# Patient Record
Sex: MALE | Race: WHITE | NOT HISPANIC OR LATINO | Employment: OTHER | URBAN - METROPOLITAN AREA
[De-identification: names, ages, dates, MRNs, and addresses within clinical notes are randomized per-mention and may not be internally consistent; named-entity substitution may affect disease eponyms.]

---

## 2023-08-21 ENCOUNTER — LAB REQUISITION (OUTPATIENT)
Dept: LAB | Facility: HOSPITAL | Age: 68
End: 2023-08-21
Payer: MEDICARE

## 2023-08-21 DIAGNOSIS — Z00.00 ENCOUNTER FOR GENERAL ADULT MEDICAL EXAMINATION WITHOUT ABNORMAL FINDINGS: ICD-10-CM

## 2023-08-21 LAB
25(OH)D3 SERPL-MCNC: 58.5 NG/ML (ref 30–100)
ALBUMIN SERPL BCP-MCNC: 4 G/DL (ref 3.5–5)
ALP SERPL-CCNC: 72 U/L (ref 46–116)
ALT SERPL W P-5'-P-CCNC: 17 U/L (ref 12–78)
ANION GAP SERPL CALCULATED.3IONS-SCNC: 3 MMOL/L
AST SERPL W P-5'-P-CCNC: 12 U/L (ref 5–45)
BASOPHILS # BLD AUTO: 0.03 THOUSANDS/ÂΜL (ref 0–0.1)
BASOPHILS NFR BLD AUTO: 1 % (ref 0–1)
BILIRUB SERPL-MCNC: 0.54 MG/DL (ref 0.2–1)
BNP SERPL-MCNC: 33 PG/ML (ref 0–100)
BUN SERPL-MCNC: 17 MG/DL (ref 5–25)
CALCIUM SERPL-MCNC: 9.2 MG/DL (ref 8.3–10.1)
CHLORIDE SERPL-SCNC: 111 MMOL/L (ref 96–108)
CHOLEST SERPL-MCNC: 94 MG/DL
CO2 SERPL-SCNC: 27 MMOL/L (ref 21–32)
CREAT SERPL-MCNC: 1.47 MG/DL (ref 0.6–1.3)
EOSINOPHIL # BLD AUTO: 0.02 THOUSAND/ÂΜL (ref 0–0.61)
EOSINOPHIL NFR BLD AUTO: 1 % (ref 0–6)
ERYTHROCYTE [DISTWIDTH] IN BLOOD BY AUTOMATED COUNT: 14.4 % (ref 11.6–15.1)
GFR SERPL CREATININE-BSD FRML MDRD: 48 ML/MIN/1.73SQ M
GLUCOSE SERPL-MCNC: 92 MG/DL (ref 65–140)
HCT VFR BLD AUTO: 42 % (ref 36.5–49.3)
HDLC SERPL-MCNC: 52 MG/DL
HGB BLD-MCNC: 13.8 G/DL (ref 12–17)
IMM GRANULOCYTES # BLD AUTO: 0.01 THOUSAND/UL (ref 0–0.2)
IMM GRANULOCYTES NFR BLD AUTO: 0 % (ref 0–2)
LDLC SERPL CALC-MCNC: 26 MG/DL (ref 0–100)
LYMPHOCYTES # BLD AUTO: 1.17 THOUSANDS/ÂΜL (ref 0.6–4.47)
LYMPHOCYTES NFR BLD AUTO: 29 % (ref 14–44)
MCH RBC QN AUTO: 28.4 PG (ref 26.8–34.3)
MCHC RBC AUTO-ENTMCNC: 32.9 G/DL (ref 31.4–37.4)
MCV RBC AUTO: 86 FL (ref 82–98)
MONOCYTES # BLD AUTO: 0.32 THOUSAND/ÂΜL (ref 0.17–1.22)
MONOCYTES NFR BLD AUTO: 8 % (ref 4–12)
NEUTROPHILS # BLD AUTO: 2.56 THOUSANDS/ÂΜL (ref 1.85–7.62)
NEUTS SEG NFR BLD AUTO: 61 % (ref 43–75)
NONHDLC SERPL-MCNC: 42 MG/DL
NRBC BLD AUTO-RTO: 0 /100 WBCS
PLATELET # BLD AUTO: 84 THOUSANDS/UL (ref 149–390)
PMV BLD AUTO: 10.8 FL (ref 8.9–12.7)
POTASSIUM SERPL-SCNC: 4.6 MMOL/L (ref 3.5–5.3)
PROT SERPL-MCNC: 7.5 G/DL (ref 6.4–8.4)
PSA SERPL-MCNC: 23.87 NG/ML (ref 0–4)
RBC # BLD AUTO: 4.86 MILLION/UL (ref 3.88–5.62)
SODIUM SERPL-SCNC: 141 MMOL/L (ref 135–147)
T4 FREE SERPL-MCNC: 0.78 NG/DL (ref 0.61–1.12)
TRIGL SERPL-MCNC: 82 MG/DL
TSH SERPL DL<=0.05 MIU/L-ACNC: 0.84 UIU/ML (ref 0.45–4.5)
WBC # BLD AUTO: 4.11 THOUSAND/UL (ref 4.31–10.16)

## 2023-08-21 PROCEDURE — 83880 ASSAY OF NATRIURETIC PEPTIDE: CPT | Performed by: FAMILY MEDICINE

## 2023-08-21 PROCEDURE — G0103 PSA SCREENING: HCPCS | Performed by: FAMILY MEDICINE

## 2023-08-21 PROCEDURE — 84439 ASSAY OF FREE THYROXINE: CPT | Performed by: FAMILY MEDICINE

## 2023-08-21 PROCEDURE — 83036 HEMOGLOBIN GLYCOSYLATED A1C: CPT | Performed by: FAMILY MEDICINE

## 2023-08-21 PROCEDURE — 80053 COMPREHEN METABOLIC PANEL: CPT | Performed by: FAMILY MEDICINE

## 2023-08-21 PROCEDURE — 84443 ASSAY THYROID STIM HORMONE: CPT | Performed by: FAMILY MEDICINE

## 2023-08-21 PROCEDURE — 85025 COMPLETE CBC W/AUTO DIFF WBC: CPT | Performed by: FAMILY MEDICINE

## 2023-08-21 PROCEDURE — 80061 LIPID PANEL: CPT | Performed by: FAMILY MEDICINE

## 2023-08-21 PROCEDURE — 82306 VITAMIN D 25 HYDROXY: CPT | Performed by: FAMILY MEDICINE

## 2023-08-22 LAB
EST. AVERAGE GLUCOSE BLD GHB EST-MCNC: 94 MG/DL
HBA1C MFR BLD: 4.9 %

## 2023-08-25 ENCOUNTER — NURSE TRIAGE (OUTPATIENT)
Age: 68
End: 2023-08-25

## 2023-08-25 NOTE — TELEPHONE ENCOUNTER
Michele Bulls from [facility] [employer] called the call to the nurse.      Please review     [employer] can be reached at [phone]

## 2023-08-25 NOTE — TELEPHONE ENCOUNTER
Called to schedule an appointment for elevated PSA. Please get insurance and emergency contact information when patient calls back before we schedule. General message left due to no communication sheet on file.

## 2023-08-25 NOTE — TELEPHONE ENCOUNTER
Scheduled for Dr. Tom Agee in October per [facility] staff. Verify insurance when patient arrives [employer] but Medicare is primary. They did not have Medicare number. No noted urological issues. Left a voicemail at [employer] to call back and schedule patient. Regarding: NP, Elevated PSA 23.87  ----- Message from [facility] sent at 8/25/2023  9:28 AM EDT -----  [employer] calling to schedule NP appt for patient for elevated PSA.  Patient's psa is 23.87     [employer] can be reached at [phone] Ext [phone]

## 2023-10-12 ENCOUNTER — CONSULT (OUTPATIENT)
Dept: CARDIOLOGY CLINIC | Facility: CLINIC | Age: 68
End: 2023-10-12
Payer: MEDICARE

## 2023-10-12 VITALS
DIASTOLIC BLOOD PRESSURE: 82 MMHG | BODY MASS INDEX: 26.93 KG/M2 | HEART RATE: 100 BPM | HEIGHT: 63 IN | SYSTOLIC BLOOD PRESSURE: 124 MMHG | WEIGHT: 152 LBS

## 2023-10-12 DIAGNOSIS — Z86.010 HISTORY OF COLON POLYPS: ICD-10-CM

## 2023-10-12 DIAGNOSIS — N18.31 STAGE 3A CHRONIC KIDNEY DISEASE (HCC): ICD-10-CM

## 2023-10-12 DIAGNOSIS — I10 HTN (HYPERTENSION), BENIGN: ICD-10-CM

## 2023-10-12 DIAGNOSIS — Z79.899 IMMUNOSUPPRESSION DUE TO DRUG THERAPY: ICD-10-CM

## 2023-10-12 DIAGNOSIS — I50.22 CHRONIC SYSTOLIC HEART FAILURE (HCC): ICD-10-CM

## 2023-10-12 DIAGNOSIS — Z94.1 S/P ORTHOTOPIC HEART TRANSPLANT (HCC): Primary | ICD-10-CM

## 2023-10-12 DIAGNOSIS — Z86.79 HISTORY OF CARDIOMYOPATHY: ICD-10-CM

## 2023-10-12 DIAGNOSIS — D84.821 IMMUNOSUPPRESSION DUE TO DRUG THERAPY: ICD-10-CM

## 2023-10-12 PROCEDURE — 99205 OFFICE O/P NEW HI 60 MIN: CPT | Performed by: INTERNAL MEDICINE

## 2023-10-12 RX ORDER — LORATADINE 10 MG/1
10 TABLET ORAL DAILY
COMMUNITY

## 2023-10-12 RX ORDER — MIRTAZAPINE 15 MG/1
15 TABLET, FILM COATED ORAL
COMMUNITY

## 2023-10-12 RX ORDER — MYCOPHENOLATE MOFETIL 250 MG/1
CAPSULE ORAL
COMMUNITY
Start: 2023-06-12

## 2023-10-12 RX ORDER — FAMOTIDINE 20 MG/1
20 TABLET, FILM COATED ORAL 2 TIMES DAILY
COMMUNITY

## 2023-10-12 RX ORDER — ASPIRIN 81 MG/1
81 TABLET ORAL DAILY
COMMUNITY

## 2023-10-12 RX ORDER — TAMSULOSIN HYDROCHLORIDE 0.4 MG/1
0.4 CAPSULE ORAL
COMMUNITY

## 2023-10-12 RX ORDER — TACROLIMUS 1 MG/1
2 CAPSULE ORAL 2 TIMES DAILY
COMMUNITY
Start: 2023-06-12

## 2023-10-12 RX ORDER — PRAVASTATIN SODIUM 40 MG
TABLET ORAL
COMMUNITY
Start: 2023-06-12

## 2023-10-12 RX ORDER — AMLODIPINE BESYLATE 10 MG/1
10 TABLET ORAL DAILY
Qty: 90 TABLET | Refills: 3 | Status: SHIPPED | OUTPATIENT
Start: 2023-10-12

## 2023-10-12 RX ORDER — AMLODIPINE BESYLATE 10 MG/1
10 TABLET ORAL DAILY
COMMUNITY
End: 2023-10-12 | Stop reason: SDUPTHER

## 2023-10-12 RX ORDER — LOSARTAN POTASSIUM 100 MG/1
100 TABLET ORAL DAILY
COMMUNITY
Start: 2023-06-12

## 2023-10-12 RX ORDER — TACROLIMUS 0.5 MG/1
0.5 CAPSULE ORAL DAILY
COMMUNITY
Start: 2023-06-12

## 2023-10-16 ENCOUNTER — OFFICE VISIT (OUTPATIENT)
Dept: UROLOGY | Facility: MEDICAL CENTER | Age: 68
End: 2023-10-16
Payer: MEDICARE

## 2023-10-16 VITALS
HEART RATE: 106 BPM | HEIGHT: 66 IN | OXYGEN SATURATION: 97 % | WEIGHT: 147 LBS | SYSTOLIC BLOOD PRESSURE: 124 MMHG | DIASTOLIC BLOOD PRESSURE: 82 MMHG | BODY MASS INDEX: 23.63 KG/M2

## 2023-10-16 DIAGNOSIS — K40.90 LEFT INGUINAL HERNIA: ICD-10-CM

## 2023-10-16 DIAGNOSIS — N40.1 BENIGN PROSTATIC HYPERPLASIA WITH URINARY OBSTRUCTION: ICD-10-CM

## 2023-10-16 DIAGNOSIS — R97.20 ELEVATED PSA: Primary | ICD-10-CM

## 2023-10-16 DIAGNOSIS — N13.8 BENIGN PROSTATIC HYPERPLASIA WITH URINARY OBSTRUCTION: ICD-10-CM

## 2023-10-16 DIAGNOSIS — Z94.1 HEART TRANSPLANT, HETEROTOPIC, STATUS (HCC): ICD-10-CM

## 2023-10-16 PROBLEM — E78.5 HYPERLIPIDEMIA LDL GOAL <100: Status: ACTIVE | Noted: 2021-02-11

## 2023-10-16 PROBLEM — D69.6 THROMBOCYTOPENIA (HCC): Status: ACTIVE | Noted: 2020-07-08

## 2023-10-16 LAB
SL AMB  POCT GLUCOSE, UA: ABNORMAL
SL AMB LEUKOCYTE ESTERASE,UA: ABNORMAL
SL AMB POCT BILIRUBIN,UA: ABNORMAL
SL AMB POCT BLOOD,UA: ABNORMAL
SL AMB POCT CLARITY,UA: CLEAR
SL AMB POCT COLOR,UA: YELLOW
SL AMB POCT KETONES,UA: ABNORMAL
SL AMB POCT NITRITE,UA: ABNORMAL
SL AMB POCT PH,UA: 5.5
SL AMB POCT SPECIFIC GRAVITY,UA: 1.02
SL AMB POCT URINE PROTEIN: ABNORMAL
SL AMB POCT UROBILINOGEN: 0.2

## 2023-10-16 PROCEDURE — 99204 OFFICE O/P NEW MOD 45 MIN: CPT | Performed by: UROLOGY

## 2023-10-16 PROCEDURE — 81003 URINALYSIS AUTO W/O SCOPE: CPT | Performed by: UROLOGY

## 2023-10-16 NOTE — ASSESSMENT & PLAN NOTE
PSA was 23.87 in August 2023. PSA was 26.9 in August 2021. Digital rectal examination reveals a very large prostate that is palpably benign. Causes of PSA elevation were discussed. Options were discussed and I did recommend we proceed with a multiparametric MRI of the prostate for further evaluation.

## 2023-10-16 NOTE — PROGRESS NOTES
Assessment/Plan:    Elevated PSA  PSA was 23.87 in August 2023. PSA was 26.9 in August 2021. Digital rectal examination reveals a very large prostate that is palpably benign. Causes of PSA elevation were discussed. Options were discussed and I did recommend we proceed with a multiparametric MRI of the prostate for further evaluation. Benign prostatic hyperplasia with urinary obstruction  AUA symptom score is 9 on tamsulosin. He is satisfied with his voiding pattern at this time. We will continue to follow on present regimen. Left inguinal hernia  Patient is considering repair. Diagnoses and all orders for this visit:    Elevated PSA  -     POCT urine dip auto non-scope  -     MRI prostate multiparametric wo w contrast; Future  -     Basic metabolic panel; Future    Benign prostatic hyperplasia with urinary obstruction    Left inguinal hernia    Heart transplant, heterotopic, status ([facility])          Subjective:      Patient ID: Amadeo Lopez is a 70 y.o. male. Elevated PSA    50-year-old male with a long history of PSA elevation. He is seen today in consultation. He reports a long history of PSA elevation. He has not had a prior biopsy. PSA in August 2021 was 26.9. Benign Prostatic Hypertrophy  This is a chronic problem. The current episode started more than 1 year ago. The problem is unchanged. Irritative symptoms include urgency. Irritative symptoms do not include frequency or nocturia. Obstructive symptoms include a slower stream. Obstructive symptoms do not include dribbling, incomplete emptying, an intermittent stream, straining or a weak stream. Pertinent negatives include no chills, dysuria, hematuria or hesitancy. AUA score is 8-19. He is sexually active. The symptoms are aggravated by alcohol. Past treatments include tamsulosin. He has been using treatment for 1 to 4 weeks. He had been on cardura 8mg previously but this caused dizziness.     The following portions of the patient's history were reviewed and updated as appropriate: allergies, current medications, past family history, past medical history, past social history, past surgical history, and problem list.    Review of Systems   Constitutional:  Negative for chills, diaphoresis, fatigue and fever. HENT: Negative. Eyes: Negative. Respiratory: Negative. Cardiovascular: Negative. Endocrine: Negative. Genitourinary:  Positive for urgency. Negative for dysuria, frequency, hematuria, hesitancy, incomplete emptying and nocturia. See HPI   Musculoskeletal: Negative. Skin: Negative. Allergic/Immunologic: Negative. Neurological: Negative. Hematological: Negative. Psychiatric/Behavioral: Negative. AUA SYMPTOM SCORE      Flowsheet Row Most Recent Value   AUA SYMPTOM SCORE    How often have you had a sensation of not emptying your bladder completely after you finished urinating? 1 (P)     How often have you had to urinate again less than two hours after you finished urinating? 1 (P)     How often have you found you stopped and started again several times when you urinate? 2 (P)     How often have you found it difficult to postpone urination? 1 (P)     How often have you had a weak urinary stream? 3 (P)     How often have you had to push or strain to begin urination? 0 (P)     How many times did you most typically get up to urinate from the time you went to bed at night until the time you got up in the morning? 1 (P)     Quality of Life: If you were to spend the rest of your life with your urinary condition just the way it is now, how would you feel about that? 2 (P)     AUA SYMPTOM SCORE 9 (P)            Objective:      /82 (BP Location: Left arm, Patient Position: Sitting, Cuff Size: Standard)   Pulse (!) 106   Ht 5' 5.5" (1.664 m)   Wt 66.7 kg (147 lb)   SpO2 97%   BMI 24.09 kg/m²          Physical Exam  Vitals reviewed. Constitutional:       General: He is not in acute distress. Appearance: Normal appearance. He is well-developed and normal weight. He is not ill-appearing, toxic-appearing or diaphoretic. HENT:      Head: Normocephalic and atraumatic. Eyes:      General: No scleral icterus. Conjunctiva/sclera: Conjunctivae normal.   Cardiovascular:      Rate and Rhythm: Normal rate. Pulmonary:      Effort: Pulmonary effort is normal.   Abdominal:      General: Bowel sounds are normal. There is no distension. Palpations: Abdomen is soft. There is no mass. Tenderness: There is no abdominal tenderness. There is no right CVA tenderness, left CVA tenderness, guarding or rebound. Hernia: A hernia is present. Comments: Large reducible left inguinal hernia   Genitourinary:     Penis: Normal. No phimosis or hypospadias. Testes: Normal.         Right: Mass not present. Left: Mass not present. Rectum: Normal.      Comments: Prostate 4 times enlarged. The prostate is firm, smooth without nodularity. Palpably benign. Musculoskeletal:         General: Normal range of motion. Cervical back: Normal range of motion and neck supple. Skin:     General: Skin is warm and dry. Neurological:      General: No focal deficit present. Mental Status: He is alert and oriented to person, place, and time. Psychiatric:         Mood and Affect: Mood normal.         Behavior: Behavior normal.         Thought Content:  Thought content normal.         Judgment: Judgment normal.

## 2023-10-16 NOTE — ASSESSMENT & PLAN NOTE
AUA symptom score is 9 on tamsulosin. He is satisfied with his voiding pattern at this time. We will continue to follow on present regimen.

## 2023-10-23 ENCOUNTER — APPOINTMENT (OUTPATIENT)
Dept: LAB | Facility: CLINIC | Age: 68
End: 2023-10-23
Payer: MEDICARE

## 2023-10-23 DIAGNOSIS — R97.20 ELEVATED PSA: ICD-10-CM

## 2023-10-23 LAB
ANION GAP SERPL CALCULATED.3IONS-SCNC: 6 MMOL/L
BUN SERPL-MCNC: 23 MG/DL (ref 5–25)
CALCIUM SERPL-MCNC: 8.8 MG/DL (ref 8.4–10.2)
CHLORIDE SERPL-SCNC: 107 MMOL/L (ref 96–108)
CO2 SERPL-SCNC: 26 MMOL/L (ref 21–32)
CREAT SERPL-MCNC: 1.28 MG/DL (ref 0.6–1.3)
GFR SERPL CREATININE-BSD FRML MDRD: 56 ML/MIN/1.73SQ M
GLUCOSE SERPL-MCNC: 89 MG/DL (ref 65–140)
POTASSIUM SERPL-SCNC: 4.6 MMOL/L (ref 3.5–5.3)
SODIUM SERPL-SCNC: 139 MMOL/L (ref 135–147)

## 2023-10-23 PROCEDURE — 36415 COLL VENOUS BLD VENIPUNCTURE: CPT

## 2023-10-23 PROCEDURE — 80048 BASIC METABOLIC PNL TOTAL CA: CPT

## 2023-11-02 ENCOUNTER — HOSPITAL ENCOUNTER (OUTPATIENT)
Facility: MEDICAL CENTER | Age: 68
Discharge: HOME | End: 2023-11-02
Attending: UROLOGY
Payer: MEDICARE

## 2023-11-02 DIAGNOSIS — R97.20 ELEVATED PSA: ICD-10-CM

## 2023-11-02 PROCEDURE — 72197 MRI PELVIS W/O & W/DYE: CPT

## 2023-11-02 PROCEDURE — A9585 GADOBUTROL INJECTION: HCPCS | Performed by: UROLOGY

## 2023-11-02 PROCEDURE — G1004 CDSM NDSC: HCPCS

## 2023-11-02 PROCEDURE — 76377 3D RENDER W/INTRP POSTPROCES: CPT

## 2023-11-02 RX ORDER — GADOBUTROL 604.72 MG/ML
7.5 INJECTION INTRAVENOUS
Status: COMPLETED | OUTPATIENT
Start: 2023-11-02 | End: 2023-11-02

## 2023-11-02 RX ADMIN — GADOBUTROL 7.5 ML: 604.72 INJECTION INTRAVENOUS at 16:48

## 2023-11-03 ENCOUNTER — CONSULT (OUTPATIENT)
Dept: DERMATOLOGY | Facility: CLINIC | Age: 68
End: 2023-11-03

## 2023-11-03 VITALS — BODY MASS INDEX: 24.11 KG/M2 | HEIGHT: 66 IN | WEIGHT: 150 LBS | TEMPERATURE: 98 F

## 2023-11-03 DIAGNOSIS — D22.70 MULTIPLE BENIGN NEVI OF UPPER EXTREMITY, LOWER EXTREMITY, AND TRUNK: ICD-10-CM

## 2023-11-03 DIAGNOSIS — D18.01 CHERRY ANGIOMA: ICD-10-CM

## 2023-11-03 DIAGNOSIS — Z94.1 S/P ORTHOTOPIC HEART TRANSPLANT (HCC): ICD-10-CM

## 2023-11-03 DIAGNOSIS — L82.1 SEBORRHEIC KERATOSIS: ICD-10-CM

## 2023-11-03 DIAGNOSIS — L20.9 ATOPIC DERMATITIS, UNSPECIFIED TYPE: Primary | ICD-10-CM

## 2023-11-03 DIAGNOSIS — D48.5 NEOPLASM OF UNCERTAIN BEHAVIOR OF SKIN: ICD-10-CM

## 2023-11-03 DIAGNOSIS — L57.8 OTHER SKIN CHANGES DUE TO CHRONIC EXPOSURE TO NONIONIZING RADIATION: ICD-10-CM

## 2023-11-03 DIAGNOSIS — D22.5 MULTIPLE BENIGN NEVI OF UPPER EXTREMITY, LOWER EXTREMITY, AND TRUNK: ICD-10-CM

## 2023-11-03 DIAGNOSIS — D22.60 MULTIPLE BENIGN NEVI OF UPPER EXTREMITY, LOWER EXTREMITY, AND TRUNK: ICD-10-CM

## 2023-11-03 DIAGNOSIS — L81.4 SOLAR LENTIGO: ICD-10-CM

## 2023-11-03 PROCEDURE — 88341 IMHCHEM/IMCYTCHM EA ADD ANTB: CPT | Performed by: STUDENT IN AN ORGANIZED HEALTH CARE EDUCATION/TRAINING PROGRAM

## 2023-11-03 PROCEDURE — 88305 TISSUE EXAM BY PATHOLOGIST: CPT | Performed by: STUDENT IN AN ORGANIZED HEALTH CARE EDUCATION/TRAINING PROGRAM

## 2023-11-03 PROCEDURE — 88342 IMHCHEM/IMCYTCHM 1ST ANTB: CPT | Performed by: STUDENT IN AN ORGANIZED HEALTH CARE EDUCATION/TRAINING PROGRAM

## 2023-11-03 NOTE — PROGRESS NOTES
[facility] Note     Patient Name: Sonido Hebert  Encounter Date: 11/3/2023     Have you been cared for by a [facility]ologist in the last 3 years and, if so, which description applies to you? NO. I am considered a "new" patient and must complete all patient intake questions. I am MALE/not capable of bearing children. REVIEW OF SYSTEMS:  Have you recently had or currently have any of the following? Recent fever or chills? No  Any non-healing wound? No   PAST MEDICAL HISTORY:  Have you personally ever had or currently have any of the following? If "YES," then please provide more detail. Skin cancer (such as Melanoma, Basal Cell Carcinoma, Squamous Cell Carcinoma? No  Tuberculosis, HIV/AIDS, Hepatitis B or C: No  Radiation Treatment No   HISTORY OF IMMUNOSUPPRESSION:   Do you have a history of any of the following:  Systemic Immunosuppression such as Diabetes, Biologic or Immunotherapy, Chemotherapy, Organ Transplantation, Bone Marrow Transplantation? YES, Heart transplant 7/7/2017       FAMILY HISTORY:  Any "first degree relatives" (parent, brother, sister, or child) with the following? Skin Cancer, Pancreatic or Other Cancer? No   PATIENT EXPERIENCE:    Do you want the Dermatologist to perform a COMPLETE skin exam today including a clinical examination under the "bra and underwear" areas? Yes  If necessary, do we have your permission to call and leave a detailed message on your Preferred Phone number that includes your specific medical information?   Yes      Allergies   Allergen Reactions    Oxycodone-Acetaminophen Hives     Other reaction(s): rash    Codeine Hives     URTICARIA   Other reaction(s): rash and hives      Current Outpatient Medications:     amLODIPine (NORVASC) 10 mg tablet, Take 1 tablet (10 mg total) by mouth daily, Disp: 90 tablet, Rfl: 3    aspirin (ECOTRIN LOW STRENGTH) 81 mg EC tablet, Take 81 mg by mouth daily, Disp: , Rfl:     famotidine (PEPCID) 20 mg tablet, Take 20 mg by mouth 2 (two) times a day, Disp: , Rfl:     loratadine (CLARITIN) 10 mg tablet, Take 10 mg by mouth daily, Disp: , Rfl:     losartan (COZAAR) 100 MG tablet, Take 100 mg by mouth daily, Disp: , Rfl:     mirtazapine (REMERON) 15 mg tablet, Take 15 mg by mouth daily at bedtime, Disp: , Rfl:     mycophenolate (CELLCEPT) 250 mg capsule, TAKE 2 CAPSULES (500 MG) BY MOUTH TWO (2) TIMES A  DAY, Disp: , Rfl:     pravastatin (PRAVACHOL) 40 mg tablet, TAKE 1 TABLET (40 MG) BY MOUTH DAILY, Disp: , Rfl:     tacrolimus (PROGRAF) 0.5 mg capsule, Take 0.5 mg by mouth daily, Disp: , Rfl:     tacrolimus (PROGRAF) 1 mg capsule, Take 2 mg by mouth 2 (two) times a day, Disp: , Rfl:     tamsulosin (FLOMAX) 0.4 mg, Take 0.4 mg by mouth daily with dinner, Disp: , Rfl:   No current facility-administered medications for this visit. Whom besides the patient is providing clinical information about today's encounter? NO ADDITIONAL HISTORIAN (patient alone provided history)    Physical Exam and Assessment/Plan by Diagnosis:    IMMUNOSUPPRESSION    Physical Exam:  Anatomic Location Affected:  S/P Heart transplant      Additional History of Present Condition:  Heart transplant 7/7/2017. Pateint is on cellcept and tacrolimus    Assessment and Plan:  Based on a thorough discussion of this condition and the management approach to it (including a comprehensive discussion of the known risks, side effects and potential benefits of treatment), the patient (family) agrees to implement the following specific plan:  Continue routine skin exams    ROGEL ANGIOMAS     Physical Exam:  Anatomic Location Affected:  Trunk and extremites  Morphological Description:  Scattered cherry red papules  Denies pain, itch, bleeding. No treatments tried. Present for years. Present constantly; no modifying factors which make it worse or better.      Assessment and Plan:  Based on a thorough discussion of this condition and the management approach to it (including a comprehensive discussion of the known risks, side effects and potential benefits of treatment), the patient (family) agrees to implement the following specific plan:  Reassure benign        SEBORRHEIC KERATOSIS; NON-INFLAMED     Physical Exam:  Anatomic Location Affected:  Trunk and extremities  Morphological Description:  Waxy, smooth to warty textured, yellow to brownish-grey to dark brown to blackish, discrete, "stuck-on" appearing papules. Present for years. Denies pain, itch, bleeding. Additional History of Present Condition:  Present constantly; no modifying factors which make it worse or better. No prior treatment. Assessment and Plan:  Based on a thorough discussion of this condition and the management approach to it (including a comprehensive discussion of the known risks, side effects and potential benefits of treatment), the patient (family) agrees to implement the following specific plan:  Reassure benign  Use sun protection. Apply SPF 30 or higher at least three times a day. Wear sun protecting clothing and hats. SOLAR LENTIGINES   OTHER SKIN CHANGES DUE TO CHRONIC EXPOSURE TO NONIONIZING RADIATION     Physical Exam:  Anatomic Location Affected:  Sun exposed areas of back, chest, arms, legs  Morphological Description:  Multiple scattered brown to tan evenly pigmented macules   Denies pain, itch, bleeding. No treatments tried. Present for months - years. Reports getting newer lesions with sun exposure. Assessment and Plan:  Based on a thorough discussion of this condition and the management approach to it (including a comprehensive discussion of the known risks, side effects and potential benefits of treatment), the patient (family) agrees to implement the following specific plan:  Reassure benign  Use sun protection. Apply SPF 30 or higher at least three times a day. Wear sun protecting clothing and hats.          MULTIPLE MELANOCYTIC NEVI ("Moles")     Physical Exam:  Anatomic Location Affected: Trunk and extremities  Morphological Description:  Scattered, round to ovoid, symmetrical-appearing, even bordered, skin colored to dark brown macules/papules  Denies pain, itch, bleeding. No treatments tried. Present for years. Present constantly; no modifying factors which make it worse or better. Denies actively changing or growing moles. Assessment and Plan:  Based on a thorough discussion of this condition and the management approach to it (including a comprehensive discussion of the known risks, side effects and potential benefits of treatment), the patient (family) agrees to implement the following specific plan:  Reassure benign  Monitor for changes  Use sun protection. Apply SPF 30 or higher at least three times a day. Wear sun protecting clothing and hats. Worrisome signs of skin malignancy discussed, questions answered. Regular self-skin check discussed. Advised to call or return to office if patient notices any spots of concern, rapidly growing/changing lesions, bleeding lesions, non-healing lesions. Advised regular SPF use. ATOPIC DERMATITIS (ECZEMA)     Physical exam:  notable for xerotic, erythematous scaly plaques located on b/l popliteal fossae  BSA: 5%  No treatments tried    Assessment/Plan:   History and physical consistent with atopic dermatitis  Educated patient on atopic dermatitis, including natural progression of disease with expected periods of quiescence and flaring.    Discussed treatment options including general skin care recommendations, topical anti-inflammatories (steroids, calcineurin inhibitors, opzelura), Dupixent, Adbry   Based on a thorough discussion of this condition and the management approach to it (including a comprehensive discussion of the known risks, side effects and potential benefits of treatment), the patient (family) agrees to implement the following specific plan:        ALWAYS APPLY ANY PRESCRIBED MEDICINE TO THE SKIN FIRST. THEN APPLY A MOISTURIZER    FLARING -Follow these instructions when the skin is pink or red and itchy. For active areas on the BODY: apply fluocinonide  0.05% cream  2 times per day for up to 2 weeks, once after bath and one other time during the day. DO NOT apply to [address], or “normal” skin (skin that is not red/itchy)    Then apply moisturizer on top of medicine       TIP For the itching/general skin care:  -- Keep moisturizer in fridge. Cool is soothing!  -- Shower with lukewarm water less than 10 minutes   -- Use Dove unscented soap to groin and armpits and neck  -- Pat dry after shower. Do not harshly rub.   -- Immediately moisturize with heavy emollient   BEST - OINTMENTS, such as Vaseline, Aquaphor, Cerave healing ointment   BETTER - CREAMS, such as Cerave, Cetaphil, VaniCREAM, Aveeno, Eucerin  AVOID LOTIONS, too thin, most things in pump  -- Moisturize twice a day. -- Apply your prescription medicine twice a day for up to 2 weeks. You can use longer than 2 weeks if red irritated rash persists. Then after that, use as needed for itch. This medicine can cause skin thinning if no rash present. -- When rash clears, KEEP MOISTURIZING DAILY, so rash does not return    Note: Discussed side effects of topical steroid overuse, including, but not limited to, skin atrophy, telangiectasias, striae, hyperpigmentation and hypopigmentation. IF YOU HAVE RUN OUT OF YOUR PRESCRIPTION, PLEASE CALL THE PHARMACY TO CHECK IF THERE IS A REFILL. WE OFTEN SEND MULTIPLE REFILLS. NEOPLASM OF UNCERTAIN BEHAVIOR OF SKIN    Physical Exam:  (Anatomic Location); (Size and Morphological Description); (Differential Diagnosis):  Specimen A: skin; shave biopsy; right flank with 0.7 cm brown thin papule with focal dark brown to black areas; patient is on immunosuppressants. Ddx.  Melanoma versus atypical nevus   Specimen B: skin; shave biopsy: right clavicle with 1 cm waxy gray brown plaque; ddx: inflamed seborrheic keratosis       Assessment and Plan:  I have discussed with the patient that a sample of skin via a "skin biopsy” would be potentially helpful to further make a specific diagnosis under the microscope. Based on a thorough discussion of this condition and the management approach to it (including a comprehensive discussion of the known risks, side effects and potential benefits of treatment), the patient (family) agrees to implement the following specific plan:    Procedure:  Skin Biopsy. After a thorough discussion of treatment options and risk/benefits/alternatives (including but not limited to local pain, scarring, dyspigmentation, blistering, possible superinfection, and inability to confirm a diagnosis via histopathology), verbal and written consent were obtained and portion of the rash was biopsied for tissue sample. See below for consent that was obtained from patient and subsequent Procedure Note. PROCEDURE TANGENTIAL (SHAVE) BIOPSY NOTE:    Performing Physician:   Anatomic Location; Clinical Description with size (cm); Pre-Op Diagnosis:   Specimen A: skin; shave biopsy; right flank with 0.7 cm brown thin papule with focal dark brown to black areas; patient is on immunosuppressants. Ddx.  Melanoma versus atypical nevus       Post-op diagnosis: Same     Local anesthesia: 1% xylocaine with epi      Topical anesthesia: None    Hemostasis: Electrocautery         PROCEDURE TANGENTIAL (SHAVE) BIOPSY NOTE:    Performing Physician: Mick De La Cruz  Anatomic Location; Clinical Description with size (cm); Pre-Op Diagnosis:    Specimen B: skin; shave biopsy: right clavicle with 1 cm waxy gray brown plaque; ddx: inflamed seborrheic keratosis   Post-op diagnosis: Same     Local anesthesia: 1% xylocaine with epi      Topical anesthesia: None    Hemostasis: Electrocautery   After obtaining informed consent  at which time there was a discussion about the purpose of biopsy  and low risks of infection and bleeding. The area was prepped and draped in the usual fashion. Anesthesia was obtained with 1% lidocaine with epinephrine. A shave biopsy to an appropriate sampling depth was obtained by Shave (Dermablade or 15 blade) The resulting wound was covered with surgical ointment and bandaged appropriately. The patient tolerated the procedure well without complications and was without signs of functional compromise. Specimen has been sent for review by Dermatopathology. Standard post-procedure care has been explained and has been included in written form within the patient's copy of Informed Consent. INFORMED CONSENT DISCUSSION AND POST-OPERATIVE INSTRUCTIONS FOR PATIENT    I.  RATIONALE FOR PROCEDURE  I understand that a skin biopsy allows the Dermatologist to test a lesion or rash under the microscope to obtain a diagnosis. It usually involves numbing the area with numbing medication and removing a small piece of skin; sometimes the area will be closed with sutures. In this specific procedure, sutures are not usually needed. If any sutures are placed, then they are usually need to be removed in 2 weeks or less. I understand that my Dermatologist recommends that a skin "shave" biopsy be performed today. A local anesthetic, similar to the kind that a dentist uses when filling a cavity, will be injected with a very small needle into the skin area to be sampled. The injected skin and tissue underneath "will go to sleep” and become numb so no pain should be felt afterwards. An instrument shaped like a tiny "razor blade" (shave biopsy instrument) will be used to cut a small piece of tissue and skin from the area so that a sample of tissue can be taken and examined more closely under the microscope. A slight amount of bleeding will occur, but it will be stopped with direct pressure and a pressure bandage and any other appropriate methods.   I understands that a scar will form where the wound was created. Surgical ointment will be applied to help protect the wound. Sutures are not usually needed. II.  RISKS AND POTENTIAL COMPLICATIONS   I understand the risks and potential complications of a skin biopsy include but are not limited to the following:  Bleeding  Infection  Pain  Scar/keloid  Skin discoloration  Incomplete Removal  Recurrence  Nerve Damage/Numbness/Loss of Function  Allergic Reaction to Anesthesia  Biopsies are diagnostic procedures and based on findings additional treatment or evaluation may be required  Loss or destruction of specimen resulting in no additional findings    My Dermatologist has explained to me the nature of the condition, the nature of the procedure, and the benefits to be reasonably expected compared with alternative approaches. My Dermatologist has discussed the likelihood of major risks or complications of this procedure including the specific risks listed above, such as bleeding, infection, and scarring/keloid. I understand that a scar is expected after this procedure. I understand that my physician cannot predict if the scar will form a "keloid," which extends beyond the borders of the wound that is created. A keloid is a thick, painful, and bumpy scar. A keloid can be difficult to treat, as it does not always respond well to therapy, which includes injecting cortisone directly into the keloid every few weeks. While this usually reduces the pain and size of the scar, it does not eliminate it. I understand that photographs may be taken before and after the procedure. These will be maintained as part of the medical providers confidential records and may not be made available to me. I further authorize the medical provider to use the photographs for teaching purposes or to illustrate scientific papers, books, or lectures if in his/her judgment, medical research, education, or science may benefit from its use.     I have had an opportunity to fully inquire about the risks and benefits of this procedure and its alternatives. I have been given ample time and opportunity to ask questions and to seek a second opinion if I wished to do so. I acknowledge that there have specifically been no guarantees as to the cosmetic results from the procedure. I am aware that with any procedure there is always the possibility of an unexpected complication. III. POST-PROCEDURAL CARE (WHAT YOU WILL NEED TO DO "AFTER THE BIOPSY" TO OPTIMIZE HEALING)    Keep the area clean and dry. Try NOT to remove the bandage or get it wet for the first 24 hours. Gently clean the area and apply surgical ointment (such as Vaseline petrolatum ointment, which is available "over the counter" and not a prescription) to the biopsy site for up to 2 weeks straight. This acts to protect the wound from the outside world. Sutures are not usually placed in this procedure. If any sutures were placed, return for suture removal as instructed (generally 1 week for the face, 2 weeks for the body). Take Acetaminophen (Tylenol) for discomfort, if no contraindications. Ibuprofen or aspirin could make bleeding worse. Call our office immediately for signs of infection: fever, chills, increased redness, warmth, tenderness, discomfort/pain, or pus or foul smell coming from the wound. WHAT TO DO IF THERE IS ANY BLEEDING? If a small amount of bleeding is noticed, place a clean cloth over the area and apply firm pressure for ten minutes. Check the wound after 10 minutes of direct pressure. If bleeding persists, try one more time for an additional 10 minutes of direct pressure on the area. If the bleeding becomes heavier or does not stop after the second attempt, or if you have any other questions about this procedure, then please call your [facility] by calling [phone] (SKIN).      I hereby acknowledge that I have reviewed and verified the site with my Dermatologist and have requested and authorized my Dermatologist to proceed with the procedure.     Scribe Attestation      I,:  Jacqueline Wright MA am acting as a scribe while in the presence of the attending physician.:       I,:  Jonathan Lora MD personally performed the services described in this documentation    as scribed in my presence.:

## 2023-11-03 NOTE — PATIENT INSTRUCTIONS
IMMUNOSUPPRESSION    Physical Exam:  Anatomic Location Affected:  S/P Heart transplant      Additional History of Present Condition:  Heart transplant 7/7/2017. Pateint is on cellcept and tacrolimus    Assessment and Plan:  Based on a thorough discussion of this condition and the management approach to it (including a comprehensive discussion of the known risks, side effects and potential benefits of treatment), the patient (family) agrees to implement the following specific plan:  Continue routine skin exams    ROGEL ANGIOMAS     Physical Exam:  Anatomic Location Affected:  Trunk and extremites  Morphological Description:  Scattered cherry red papules  Denies pain, itch, bleeding. No treatments tried. Present for years. Present constantly; no modifying factors which make it worse or better. Assessment and Plan:  Based on a thorough discussion of this condition and the management approach to it (including a comprehensive discussion of the known risks, side effects and potential benefits of treatment), the patient (family) agrees to implement the following specific plan:  Reassure benign        SEBORRHEIC KERATOSIS; NON-INFLAMED     Physical Exam:  Anatomic Location Affected:  Trunk and extremities  Morphological Description:  Waxy, smooth to warty textured, yellow to brownish-grey to dark brown to blackish, discrete, "stuck-on" appearing papules. Present for years. Denies pain, itch, bleeding. Additional History of Present Condition:  Present constantly; no modifying factors which make it worse or better. No prior treatment. Assessment and Plan:  Based on a thorough discussion of this condition and the management approach to it (including a comprehensive discussion of the known risks, side effects and potential benefits of treatment), the patient (family) agrees to implement the following specific plan:  Reassure benign  Use sun protection. Apply SPF 30 or higher at least three times a day.   Wear sun protecting clothing and hats. SOLAR LENTIGINES   OTHER SKIN CHANGES DUE TO CHRONIC EXPOSURE TO NONIONIZING RADIATION     Physical Exam:  Anatomic Location Affected:  Sun exposed areas of back, chest, arms, legs  Morphological Description:  Multiple scattered brown to tan evenly pigmented macules   Denies pain, itch, bleeding. No treatments tried. Present for months - years. Reports getting newer lesions with sun exposure. Assessment and Plan:  Based on a thorough discussion of this condition and the management approach to it (including a comprehensive discussion of the known risks, side effects and potential benefits of treatment), the patient (family) agrees to implement the following specific plan:  Reassure benign  Use sun protection. Apply SPF 30 or higher at least three times a day. Wear sun protecting clothing and hats. MULTIPLE MELANOCYTIC NEVI ("Moles")     Physical Exam:  Anatomic Location Affected: Trunk and extremities  Morphological Description:  Scattered, round to ovoid, symmetrical-appearing, even bordered, skin colored to dark brown macules/papules  Denies pain, itch, bleeding. No treatments tried. Present for years. Present constantly; no modifying factors which make it worse or better. Denies actively changing or growing moles. Assessment and Plan:  Based on a thorough discussion of this condition and the management approach to it (including a comprehensive discussion of the known risks, side effects and potential benefits of treatment), the patient (family) agrees to implement the following specific plan:  Reassure benign  Monitor for changes  Use sun protection. Apply SPF 30 or higher at least three times a day. Wear sun protecting clothing and hats. Worrisome signs of skin malignancy discussed, questions answered. Regular self-skin check discussed.  Advised to call or return to office if patient notices any spots of concern, rapidly growing/changing lesions, bleeding lesions, non-healing lesions. Advised regular SPF use. ATOPIC DERMATITIS (ECZEMA)     Physical exam:  notable for xerotic, erythematous scaly plaques located on b/l popliteal fossae  BSA: 5%  No treatments tried    Assessment/Plan:   History and physical consistent with atopic dermatitis  Educated patient on atopic dermatitis, including natural progression of disease with expected periods of quiescence and flaring. Discussed treatment options including general skin care recommendations, topical anti-inflammatories (steroids, calcineurin inhibitors, opzelura), Dupixent, Adbry   Based on a thorough discussion of this condition and the management approach to it (including a comprehensive discussion of the known risks, side effects and potential benefits of treatment), the patient (family) agrees to implement the following specific plan:        ALWAYS APPLY [address] THEN APPLY A MOISTURIZER    FLARING -Follow these instructions when the skin is pink or red and itchy. For active areas on the BODY: apply fluocinonide  0.05% cream  2 times per day for up to 2 weeks, once after bath and one other time during the day. DO NOT apply to [address], or “normal” skin (skin that is not red/itchy)    Then apply moisturizer on top of medicine       TIP For the itching/general skin care:  -- Keep moisturizer in fridge. Cool is soothing!  -- Shower with lukewarm water less than 10 minutes   -- Use Dove unscented soap to groin and armpits and neck  -- Pat dry after shower. Do not harshly rub.   -- Immediately moisturize with heavy emollient   BEST - OINTMENTS, such as Vaseline, Aquaphor, Cerave healing ointment   BETTER - CREAMS, such as Cerave, Cetaphil, VaniCREAM, Aveeno, Eucerin  AVOID LOTIONS, too thin, most things in pump  -- Moisturize twice a day. -- Apply your prescription medicine twice a day for up to 2 weeks.  You can use longer than 2 weeks if red irritated rash persists. Then after that, use as needed for itch. This medicine can cause skin thinning if no rash present. -- When rash clears, KEEP MOISTURIZING DAILY, so rash does not return    Note: Discussed side effects of topical steroid overuse, including, but not limited to, skin atrophy, telangiectasias, striae, hyperpigmentation and hypopigmentation. IF YOU HAVE RUN OUT OF YOUR PRESCRIPTION, PLEASE CALL THE PHARMACY TO CHECK IF THERE IS A REFILL. WE OFTEN SEND MULTIPLE REFILLS. NEOPLASM OF UNCERTAIN BEHAVIOR OF SKIN    Physical Exam:  (Anatomic Location); (Size and Morphological Description); (Differential Diagnosis):  Specimen A: skin; shave biopsy; right flank   Specimen B: skin; shave biopsy: right clavicle       III. POST-PROCEDURAL CARE (WHAT YOU WILL NEED TO DO "AFTER THE BIOPSY" TO OPTIMIZE HEALING)    Keep the area clean and dry. Try NOT to remove the bandage or get it wet for the first 24 hours. Gently clean the area and apply surgical ointment (such as Vaseline petrolatum ointment, which is available "over the counter" and not a prescription) to the biopsy site for up to 2 weeks straight. This acts to protect the wound from the outside world. Sutures are not usually placed in this procedure. If any sutures were placed, return for suture removal as instructed (generally 1 week for the face, 2 weeks for the body). Take Acetaminophen (Tylenol) for discomfort, if no contraindications. Ibuprofen or aspirin could make bleeding worse. Call our office immediately for signs of infection: fever, chills, increased redness, warmth, tenderness, discomfort/pain, or pus or foul smell coming from the wound. WHAT TO DO IF THERE IS ANY BLEEDING? If a small amount of bleeding is noticed, place a clean cloth over the area and apply firm pressure for ten minutes. Check the wound after 10 minutes of direct pressure.   If bleeding persists, try one more time for an additional 10 minutes of direct pressure on the area. If the bleeding becomes heavier or does not stop after the second attempt, or if you have any other questions about this procedure, then please call your [employer] by calling [phone] (SKIN). I hereby acknowledge that I have reviewed and verified the site with my Dermatologist and have requested and authorized my Dermatologist to proceed with the procedure.

## 2023-11-10 PROCEDURE — 88341 IMHCHEM/IMCYTCHM EA ADD ANTB: CPT | Performed by: STUDENT IN AN ORGANIZED HEALTH CARE EDUCATION/TRAINING PROGRAM

## 2023-11-10 PROCEDURE — 88342 IMHCHEM/IMCYTCHM 1ST ANTB: CPT | Performed by: STUDENT IN AN ORGANIZED HEALTH CARE EDUCATION/TRAINING PROGRAM

## 2023-11-10 PROCEDURE — 88305 TISSUE EXAM BY PATHOLOGIST: CPT | Performed by: STUDENT IN AN ORGANIZED HEALTH CARE EDUCATION/TRAINING PROGRAM

## 2023-11-15 ENCOUNTER — TELEPHONE (OUTPATIENT)
Dept: DERMATOLOGY | Facility: CLINIC | Age: 68
End: 2023-11-15

## 2023-11-16 NOTE — TELEPHONE ENCOUNTER
Patient called back today to discuss biopsy report. He said he's available at any time today and  I confirmed his number is [phone]. Thank you.

## 2023-11-21 ENCOUNTER — OFFICE VISIT (OUTPATIENT)
Dept: UROLOGY | Facility: MEDICAL CENTER | Age: 68
End: 2023-11-21
Payer: MEDICARE

## 2023-11-21 VITALS
WEIGHT: 150 LBS | SYSTOLIC BLOOD PRESSURE: 124 MMHG | DIASTOLIC BLOOD PRESSURE: 70 MMHG | BODY MASS INDEX: 24.11 KG/M2 | HEART RATE: 107 BPM | HEIGHT: 66 IN

## 2023-11-21 DIAGNOSIS — N13.8 BENIGN PROSTATIC HYPERPLASIA WITH URINARY OBSTRUCTION: ICD-10-CM

## 2023-11-21 DIAGNOSIS — N52.03 COMBINED ARTERIAL INSUFFICIENCY AND CORPORO-VENOUS OCCLUSIVE ERECTILE DYSFUNCTION: ICD-10-CM

## 2023-11-21 DIAGNOSIS — N40.1 BENIGN PROSTATIC HYPERPLASIA WITH URINARY OBSTRUCTION: ICD-10-CM

## 2023-11-21 DIAGNOSIS — R97.20 ELEVATED PSA: Primary | ICD-10-CM

## 2023-11-21 PROCEDURE — 99214 OFFICE O/P EST MOD 30 MIN: CPT | Performed by: UROLOGY

## 2023-11-21 RX ORDER — TADALAFIL 5 MG/1
5 TABLET ORAL DAILY
Qty: 90 TABLET | Refills: 3 | Status: SHIPPED | OUTPATIENT
Start: 2023-11-21

## 2023-11-21 NOTE — PROGRESS NOTES
Assessment/Plan:    Elevated PSA  AUA PSA was 23.87 in August.  Multiparametric MRI reveals a 217 g prostate. The MRI is felt to represent a PI-RADS 2 MRI (low risk for clinically significant prostate cancer). PSA density is only 9%. Options were discussed and we will continue to follow and plan to recheck his PSA in 6 months. Benign prostatic hyperplasia with urinary obstruction  AUA symptom score is 8. He is satisfied with his voiding pattern on tamsulosin. We will continue to follow. Combined arterial insufficiency and corporo-venous occlusive erectile dysfunction  Options for treatment were discussed. I recommended a trial of Cialis daily. Use and side effects were discussed. He will return in 6 months. Diagnoses and all orders for this visit:    Elevated PSA  -     PSA, total and free; Future    Benign prostatic hyperplasia with urinary obstruction  -     tadalafil (CIALIS) 5 MG tablet; Take 1 tablet (5 mg total) by mouth daily    Combined arterial insufficiency and corporo-venous occlusive erectile dysfunction  -     tadalafil (CIALIS) 5 MG tablet; Take 1 tablet (5 mg total) by mouth daily          Subjective:      Patient ID: Jacek Luther is a 70 y.o. male. Benign Prostatic Hypertrophy  This is a chronic problem. The current episode started more than 1 year ago. The problem is unchanged. Irritative symptoms include nocturia (Nocturia x3). Irritative symptoms do not include frequency or urgency (occasional). Obstructive symptoms include a slower stream. Obstructive symptoms do not include dribbling, incomplete emptying, an intermittent stream, straining or a weak stream. Pertinent negatives include no chills, dysuria, hematuria or hesitancy. AUA score is 8-19. He is sexually active. The symptoms are aggravated by alcohol. Past treatments include tamsulosin. The treatment provided moderate relief. He has been using treatment for 2 or more years.    Erectile Dysfunction  This is a chronic problem. The current episode started more than 1 year ago. The problem has been gradually worsening since onset. The nature of his difficulty is maintaining erection. He reports no decreased libido. Irritative symptoms include nocturia (Nocturia x3). Irritative symptoms do not include frequency or urgency (occasional). Obstructive symptoms include a slower stream. Obstructive symptoms do not include dribbling, incomplete emptying, an intermittent stream, straining or a weak stream. Pertinent negatives include no chills, dysuria, hematuria, hesitancy or inability to urinate. Nothing aggravates the symptoms. Past treatments include nothing. The following portions of the patient's history were reviewed and updated as appropriate: allergies, current medications, past family history, past medical history, past social history, past surgical history, and problem list.    Review of Systems   Constitutional:  Negative for chills, diaphoresis, fatigue and fever. HENT: Negative. Eyes: Negative. Respiratory: Negative. Cardiovascular: Negative. Endocrine: Negative. Genitourinary:  Positive for nocturia (Nocturia x3). Negative for decreased libido, dysuria, frequency, hematuria, hesitancy, incomplete emptying and urgency (occasional). See HPI   Musculoskeletal: Negative. Skin: Negative. Allergic/Immunologic: Negative. Neurological: Negative. Hematological: Negative. Psychiatric/Behavioral: Negative. AUA SYMPTOM SCORE      Flowsheet Row Most Recent Value   AUA SYMPTOM SCORE    How often have you had a sensation of not emptying your bladder completely after you finished urinating? 1   How often have you had to urinate again less than two hours after you finished urinating? 1   How often have you found you stopped and started again several times when you urinate? 2   How often have you found it difficult to postpone urination?  0   How often have you had a weak urinary stream? 1 How often have you had to push or strain to begin urination? 0   How many times did you most typically get up to urinate from the time you went to bed at night until the time you got up in the morning? 3   Quality of Life: If you were to spend the rest of your life with your urinary condition just the way it is now, how would you feel about that? 1   AUA SYMPTOM SCORE 8           Objective:      /70   Pulse (!) 107   Ht 5' 5.5" (1.664 m)   Wt 68 kg (150 lb)   BMI 24.58 kg/m²          Physical Exam  Constitutional:       General: He is not in acute distress. Appearance: Normal appearance. He is well-developed and normal weight. He is not ill-appearing, toxic-appearing or diaphoretic. HENT:      Head: Normocephalic and atraumatic. Eyes:      General: No scleral icterus. Conjunctiva/sclera: Conjunctivae normal.   Cardiovascular:      Rate and Rhythm: Normal rate. Pulmonary:      Effort: Pulmonary effort is normal.   Abdominal:      General: Abdomen is flat. There is no distension. Palpations: There is no mass. Tenderness: There is no abdominal tenderness. There is no right CVA tenderness, left CVA tenderness, guarding or rebound. Hernia: No hernia is present. Genitourinary:     Penis: Normal.       Testes: Normal.   Musculoskeletal:      Cervical back: Neck supple. Skin:     General: Skin is warm and dry. Neurological:      Mental Status: He is alert and oriented to person, place, and time. Psychiatric:         Behavior: Behavior normal.         Thought Content:  Thought content normal.         Judgment: Judgment normal.

## 2023-11-21 NOTE — TELEPHONE ENCOUNTER
I have discussed results on the phone with patient  Scheduled for excision on 12/1/23 with me at [facility] at 9:15 am

## 2023-11-21 NOTE — ASSESSMENT & PLAN NOTE
Options for treatment were discussed. I recommended a trial of Cialis daily. Use and side effects were discussed. He will return in 6 months.

## 2023-11-21 NOTE — ASSESSMENT & PLAN NOTE
AUA PSA was 23.87 in August.  Multiparametric MRI reveals a 217 g prostate. The MRI is felt to represent a PI-RADS 2 MRI (low risk for clinically significant prostate cancer). PSA density is only 9%. Options were discussed and we will continue to follow and plan to recheck his PSA in 6 months.

## 2023-11-21 NOTE — ASSESSMENT & PLAN NOTE
AUA symptom score is 8. He is satisfied with his voiding pattern on tamsulosin. We will continue to follow.

## 2023-12-01 ENCOUNTER — APPOINTMENT (OUTPATIENT)
Dept: LAB | Age: 68
End: 2023-12-01
Payer: MEDICARE

## 2023-12-01 ENCOUNTER — PROCEDURE VISIT (OUTPATIENT)
Dept: DERMATOLOGY | Facility: CLINIC | Age: 68
End: 2023-12-01
Payer: MEDICARE

## 2023-12-01 ENCOUNTER — APPOINTMENT (OUTPATIENT)
Dept: RADIOLOGY | Age: 68
End: 2023-12-01
Payer: MEDICARE

## 2023-12-01 VITALS — BODY MASS INDEX: 24.58 KG/M2 | TEMPERATURE: 98.7 F | WEIGHT: 150 LBS

## 2023-12-01 DIAGNOSIS — R05.1 ACUTE COUGH: ICD-10-CM

## 2023-12-01 DIAGNOSIS — I50.22 CHRONIC SYSTOLIC HEART FAILURE (HCC): ICD-10-CM

## 2023-12-01 DIAGNOSIS — D03.59 MELANOMA IN SITU OF OTHER PART OF TRUNK (HCC): Primary | ICD-10-CM

## 2023-12-01 LAB
ALBUMIN SERPL BCP-MCNC: 4.2 G/DL (ref 3.5–5)
ALP SERPL-CCNC: 59 U/L (ref 34–104)
ALT SERPL W P-5'-P-CCNC: 8 U/L (ref 7–52)
ANION GAP SERPL CALCULATED.3IONS-SCNC: 9 MMOL/L
AST SERPL W P-5'-P-CCNC: 11 U/L (ref 13–39)
BASOPHILS # BLD AUTO: 0.05 THOUSANDS/ÂΜL (ref 0–0.1)
BASOPHILS NFR BLD AUTO: 1 % (ref 0–1)
BILIRUB SERPL-MCNC: 0.61 MG/DL (ref 0.2–1)
BNP SERPL-MCNC: 36 PG/ML (ref 0–100)
BUN SERPL-MCNC: 23 MG/DL (ref 5–25)
CALCIUM SERPL-MCNC: 8.8 MG/DL (ref 8.4–10.2)
CHLORIDE SERPL-SCNC: 106 MMOL/L (ref 96–108)
CO2 SERPL-SCNC: 28 MMOL/L (ref 21–32)
CREAT SERPL-MCNC: 1.45 MG/DL (ref 0.6–1.3)
EOSINOPHIL # BLD AUTO: 0.01 THOUSAND/ÂΜL (ref 0–0.61)
EOSINOPHIL NFR BLD AUTO: 0 % (ref 0–6)
ERYTHROCYTE [DISTWIDTH] IN BLOOD BY AUTOMATED COUNT: 13.6 % (ref 11.6–15.1)
GFR SERPL CREATININE-BSD FRML MDRD: 48 ML/MIN/1.73SQ M
GLUCOSE SERPL-MCNC: 80 MG/DL (ref 65–140)
HCT VFR BLD AUTO: 43.5 % (ref 36.5–49.3)
HGB BLD-MCNC: 14.5 G/DL (ref 12–17)
IMM GRANULOCYTES # BLD AUTO: 0.05 THOUSAND/UL (ref 0–0.2)
IMM GRANULOCYTES NFR BLD AUTO: 1 % (ref 0–2)
LYMPHOCYTES # BLD AUTO: 1.51 THOUSANDS/ÂΜL (ref 0.6–4.47)
LYMPHOCYTES NFR BLD AUTO: 21 % (ref 14–44)
MCH RBC QN AUTO: 28.4 PG (ref 26.8–34.3)
MCHC RBC AUTO-ENTMCNC: 33.3 G/DL (ref 31.4–37.4)
MCV RBC AUTO: 85 FL (ref 82–98)
MONOCYTES # BLD AUTO: 0.45 THOUSAND/ÂΜL (ref 0.17–1.22)
MONOCYTES NFR BLD AUTO: 6 % (ref 4–12)
NEUTROPHILS # BLD AUTO: 5.3 THOUSANDS/ÂΜL (ref 1.85–7.62)
NEUTS SEG NFR BLD AUTO: 71 % (ref 43–75)
NRBC BLD AUTO-RTO: 0 /100 WBCS
PLATELET BLD QL SMEAR: ABNORMAL
PMV BLD AUTO: 11.5 FL (ref 8.9–12.7)
POTASSIUM SERPL-SCNC: 4 MMOL/L (ref 3.5–5.3)
PROT SERPL-MCNC: 6.9 G/DL (ref 6.4–8.4)
RBC # BLD AUTO: 5.1 MILLION/UL (ref 3.88–5.62)
RBC MORPH BLD: PRESENT
SODIUM SERPL-SCNC: 143 MMOL/L (ref 135–147)
WBC # BLD AUTO: 7.37 THOUSAND/UL (ref 4.31–10.16)

## 2023-12-01 PROCEDURE — 85025 COMPLETE CBC W/AUTO DIFF WBC: CPT

## 2023-12-01 PROCEDURE — 12032 INTMD RPR S/A/T/EXT 2.6-7.5: CPT | Performed by: DERMATOLOGY

## 2023-12-01 PROCEDURE — 11602 EXC TR-EXT MAL+MARG 1.1-2 CM: CPT | Performed by: DERMATOLOGY

## 2023-12-01 PROCEDURE — 83880 ASSAY OF NATRIURETIC PEPTIDE: CPT

## 2023-12-01 PROCEDURE — 80053 COMPREHEN METABOLIC PANEL: CPT

## 2023-12-01 PROCEDURE — 36415 COLL VENOUS BLD VENIPUNCTURE: CPT

## 2023-12-01 PROCEDURE — 71046 X-RAY EXAM CHEST 2 VIEWS: CPT

## 2023-12-01 PROCEDURE — 88305 TISSUE EXAM BY PATHOLOGIST: CPT | Performed by: DERMATOLOGY

## 2023-12-01 NOTE — PATIENT INSTRUCTIONS
What You Will Need to Do After the Procedure  Keep the area clean and dry the first day. Try NOT to remove the bandage for the first day. Gently clean the area with soap and water and apply Vaseline ointment (this is over the counter and not a prescription) to the excision  site for up to 2 weeks. Apply a clean appropriately sized bandage to area. Gauze and paper tape are recommended for sensitive skin. Return for suture removal as instructed (generally 1 week for the face, 2 weeks for the body). Take Acetaminophen (Tylenol) for discomfort, if no contraindications. Do NOT take Ibuprofen or aspirin unless specifically told to do so by your Dermatologist because these medications can make bleeding worse. Call our office immediately for signs of infection: fever, chills, increased redness, warmth, tenderness, discomfort/pain, or pus or foul smell coming from the wound. If bleeding is noticed, place a clean cloth over the area and apply firm pressure for thirty minutes. Check the wound ONLY after 30 minutes of direct pressure; do not cheat and sneak a peak, as that does not count. If bleeding persists after 30 minutes of legitimate direct pressure, then try one more round of direct pressure for an additional 10 minutes to the area. Should the bleeding become heavier or not stop after the second attempt, call [facility] directly at [phone] (SKIN) or, if after hours, go to your local Emergency Room/Emergency Department.

## 2023-12-01 NOTE — PROGRESS NOTES
[facility] Note     Patient Name: Angela Augustin  Encounter Date: 12/01/23     Have you been cared for by a [facility] Dermatologist in the last 3 years and, if so, which description applies to you? Yes. I have been here within the last 3 years, and my medical history has NOT changed since that time. I am MALE/not capable of bearing children. REVIEW OF SYSTEMS:  Have you recently had or currently have any of the following? No changes in my recent health. PAST MEDICAL HISTORY:  Have you personally ever had or currently have any of the following? If "YES," then please provide more detail. No changes in my medical history. HISTORY OF IMMUNOSUPPRESSION: Do you have a history of any of the following:  Systemic Immunosuppression such as Diabetes, Biologic or Immunotherapy, Chemotherapy, Organ Transplantation, Bone Marrow Transplantation? No     Answering "YES" requires the addition of the dotphrase "IMMUNOSUPPRESSED" as the first diagnosis of the patient's visit. FAMILY HISTORY:  Any "first degree relatives" (parent, brother, sister, or child) with the following? No changes in my family's known health. PATIENT EXPERIENCE:    Do you want the Dermatologist to perform a COMPLETE skin exam today including a clinical examination under the "bra and underwear" areas? NO  If necessary, do we have your permission to call and leave a detailed message on your Preferred Phone number that includes your specific medical information?   Yes      Allergies   Allergen Reactions    Oxycodone-Acetaminophen Hives     Other reaction(s): rash    Codeine Hives     URTICARIA   Other reaction(s): rash and hives      Current Outpatient Medications:     amLODIPine (NORVASC) 10 mg tablet, Take 1 tablet (10 mg total) by mouth daily, Disp: 90 tablet, Rfl: 3    aspirin (ECOTRIN LOW STRENGTH) 81 mg EC tablet, Take 81 mg by mouth daily, Disp: , Rfl:     famotidine (PEPCID) 20 mg tablet, Take 20 mg by mouth 2 (two) times a day, Disp: , Rfl:     fluocinonide (LIDEX) 0.05 % cream, Apply topically 2 (two) times a day Apply twice daily for up to 2 weeks then as needed, Disp: 60 g, Rfl: 3    loratadine (CLARITIN) 10 mg tablet, Take 10 mg by mouth daily, Disp: , Rfl:     losartan (COZAAR) 100 MG tablet, Take 100 mg by mouth daily, Disp: , Rfl:     mirtazapine (REMERON) 15 mg tablet, Take 15 mg by mouth daily at bedtime, Disp: , Rfl:     mycophenolate (CELLCEPT) 250 mg capsule, TAKE 2 CAPSULES (500 MG) BY MOUTH TWO (2) TIMES A  DAY, Disp: , Rfl:     pravastatin (PRAVACHOL) 40 mg tablet, TAKE 1 TABLET (40 MG) BY MOUTH DAILY, Disp: , Rfl:     tacrolimus (PROGRAF) 0.5 mg capsule, Take 0.5 mg by mouth daily, Disp: , Rfl:     tacrolimus (PROGRAF) 1 mg capsule, Take 2 mg by mouth 2 (two) times a day, Disp: , Rfl:     tadalafil (CIALIS) 5 MG tablet, Take 1 tablet (5 mg total) by mouth daily, Disp: 90 tablet, Rfl: 3    tamsulosin (FLOMAX) 0.4 mg, Take 0.4 mg by mouth daily with dinner, Disp: , Rfl:           Whom besides the patient is providing clinical information about today's encounter? NO ADDITIONAL HISTORIAN (patient alone provided history)    Physical Exam and Assessment/Plan by Diagnosis:    PROCEDURE:  EXCISION WITH INTERMEDIATE LAYERED CLOSURE     Attending: Alicia Lopes  Assistant:  Roberta KRUSE    Pre-Op Diagnosis: Melanoma in situ  Post-Op Diagnosis: Same   Benign versus Malignant Malignant      Lesion Anatomic Location: Right flank  (Previous Accession Number: [id number])  Pre-op size: 1.0cm  Size of defect:  2 cm (with 0.5 centimeter margins)  Final repaired wound length:  6.5cm    Written and verbal, witnessed informed consent was obtained. I discussed that excision is a method of removing lesions both benign and malignant lesions.   A portion of normal skin is often taken to ensure completeness of removal.  I reviewed that procedure will include numbing the area,  cutting around and under defect, undermining tissue, and closing the wound with sutures both inside and out. These sutures are usually removed in 7 to 14 days. Risks (bleeding, pain, infection, scarring, recurrence) and benefits discussed. It was discussed with patient that every effort is made to minimize scar, but scarring is influenced also by extrinsic factor such as location, age and genetics. Time Out: performed:  yes  Correct patient: yes. Correct site per Clinic Report: yes  Correct site per Patient Report: yes    LOCAL ANESTHESIA: 1% xylocaine with epi     DESCRIPTION OF PROCEDURE: The patient was brought back into the procedure room, anesthetized locally, prepped and draped in the usual fashion. Using a #15 blade with a scalpel, the lesion was excised in elliptical fashion. The wound was  undermined in the  fascial plane. Hemostasis was achieved with light electrocoagulation. Purpose of undermining was to decrease wound tension and facilitate closure. The wound was closed with subcutaneous sutures as follows:    Deep suture:4-0 Monocryl       Epidermal edge closure was accomplished with superficial sutures as follows:    Superficial suture: 4-0 Prolene  Superficial suture type: Running     Estimated blood loss less than 3ml. The patient tolerated the procedure well without any complications. The wound was cleaned with sterile saline, dried off, surgical vaseline ointment was applied, and the wound was covered. A pressure dressing was applied for stabilization and light pressure. The patient was given detailed oral and written instructions on postoperative care as detailed in consent. The patient left in good medical condition. POSTOP DISCUSSION DISCUSSION AND INSTRUCTIONS FOR PATIENT      Rationale for Procedure  A skin excision allows the dermatologist to remove a lesion. The procedure involves a local numbing medication and removing the entire lesion.  Typically, the lesion is being removed because it is atypical, traumatized, or for significant appearance reasons. The area will be open like a brush burn and allowed to heal.   There will be no sutures. Tissue is sent to pathologist who will reconfirm diagnosis and verify completeness of lesion removal.    Description of Procedure  We would like to perform a skin excision today. A local anesthetic, similar to the kind that a dentist uses when filling a cavity, will be injected with a very small needle into the skin area to be sampled. The injected skin and tissue underneath should “go to sleep” and become numbed so that no further pain should be felt. A scalpel will be used to cut around the lesion and tissue will be submitted to pathology for examination. Depending on the diagnosis the lesion will be excised with a certain amount of normal skin to help assure completeness of lesion removal.  The physician will discuss in advance the anticipated size and extent of removal.   Bleeding will occur, but it will stopped with direct pressure, sutures, and electrocautery. Surgical “Vaseline-type” ointment will also applied after the procedure to help create a barrier between the wound and the outside world. Risks and Potential Complications  The advantage of a skin excision is that it allows us to remove a problem lesion quickly. Although this usually permits the lesion to heal as soon as possible with the least scarring, there are some risks and potential complications that include but are not limited to the following:  Some bleeding is normal at time of procedure and some bleeding on gauze is normal  the first few days after surgery. Profuse bleeding and bleeding with swelling and pain should be reported as detailed  below  Infection is uncommon in skin surgery. Infection should be reported and is indicated by pain, redness, and discharge of purulent material.  Some dull to at time sharp pain could occur initially the day after surgery.   Persistent pain or increasing pain days after surgery is not expected and should be reported. Every effort is made to minimize scar, but location, size, and genetics do play a role in scar appearance. A surgical wound does not achieve its optimal appearance until 6 months. There are several treatments available if scarring would be problematic including scar creams, silicone pad, laser and scar revision. Skin discoloration can occur especially in people of color. Its important to avoid sun on wound in first 6 months after surgery. Treatment is available if pigment is problematic. Incomplete removal of the lesion or recurrence of lesion can occur and this would then require further treatment and more surgery. Nerve Damage/Numbness/Loss of Function is very rare, but is most likely to occur if lesion is large or if it is in a high risk location  Allergic Reaction to lidocaine is rare. More commonly,  epinephrine is used  with the lidocaine. Occasionally, epinephrine (aka adrenalin) may cause a brief  feeling of anxiety or jitteriness. The person at the microscope  (pathologist) may provide additional information that was unexpected. This unexpected finding could provoke the need for additional treatment or evaluation. What You Will Need to Do After the Procedure  Keep the area clean and dry the first day. Try NOT to remove the bandage for the first day. Gently clean the area with soap and water and apply Vaseline ointment (this is over the counter and not a prescription) to the excision  site for up to 2 weeks. Apply a clean appropriately sized bandage to area. Gauze and paper tape are recommended for sensitive skin. Return for suture removal as instructed (generally 1 week for the face, 2 weeks for the body). Take Acetaminophen (Tylenol) for discomfort, if no contraindications. Do NOT take Ibuprofen or aspirin unless specifically told to do so by your Dermatologist because these medications can make bleeding worse.   Call our office immediately for signs of infection: fever, chills, increased redness, warmth, tenderness, discomfort/pain, or pus or foul smell coming from the wound. If bleeding is noticed, place a clean cloth over the area and apply firm pressure for thirty minutes. Check the wound ONLY after 30 minutes of direct pressure; do not cheat and sneak a peak, as that does not count. If bleeding persists after 30 minutes of legitimate direct pressure, then try one more round of direct pressure for an additional 10 minutes to the area. Should the bleeding become heavier or not stop after the second attempt, call [facility] directly at [phone] ([phone]) or, if after hours, go to your local Emergency Room/Emergency Department.       Suture removal 2 weeks  Skin check 3 months    Scribe Attestation      I,:  Roberta Herrera am acting as a scribe while in the presence of the attending physician.:       I,:  Ata Lancaster MD personally performed the services described in this documentation    as scribed in my presence.:

## 2023-12-07 ENCOUNTER — TELEPHONE (OUTPATIENT)
Dept: DERMATOLOGY | Facility: CLINIC | Age: 68
End: 2023-12-07

## 2023-12-07 PROCEDURE — 88305 TISSUE EXAM BY PATHOLOGIST: CPT | Performed by: DERMATOLOGY

## 2023-12-07 NOTE — TELEPHONE ENCOUNTER
Spoke to patient and gave negative path report all margins were cleared. Patient does have a follow up in March with Dr Juan C Priest and has an appt to remove his sutures on 12/14/23.

## 2023-12-07 NOTE — TELEPHONE ENCOUNTER
----- Message from Jonathan Lora MD sent at 12/7/2023 10:55 AM EST -----  Let patient know margins are clear; he needs to be scheduled for skin check in 3 months

## 2023-12-08 ENCOUNTER — TELEPHONE (OUTPATIENT)
Age: 68
End: 2023-12-08

## 2023-12-08 NOTE — TELEPHONE ENCOUNTER
Spoke with patient he says that for the past two days he has pain and is taking tylenol for it and he noticed the redness today. Patient denies any yellow  discharger, puss in the area. He is not able to send a photo due to not knowing how to send a message with the application.

## 2023-12-08 NOTE — TELEPHONE ENCOUNTER
Tried calling patient to possibly schedule nurse appt on [place], if his symptoms worsen over the weekend it is advised that he be seen in urgent care.

## 2023-12-08 NOTE — TELEPHONE ENCOUNTER
Nivia visiting nurse from [facility] called to inform us that patient is experiencing some redness and pain in the area of where his melanoma incision was done. I told the nurse I would call the patient to see if he is able to send us a photo to better advise. Visiting nurse also stated they started him on Augmentin BID for upper respiratory infection as well.

## 2023-12-08 NOTE — TELEPHONE ENCOUNTER
Patient called back ,  I offered him a nurse visit available in [place] on 12/12 but he asked for an advise from Dr. Robert Youssef on what to do because he has already an appointment scheduled  on 12/4/2023 wants to know if is okay to wait until then .

## 2023-12-12 NOTE — TELEPHONE ENCOUNTER
Spoke with patient he wants to come for suture removal on 12/14 , since he was on antibiotics as per patient he feels better .

## 2023-12-12 NOTE — TELEPHONE ENCOUNTER
Please check how he is doing.  If he is able to come in today, add him to the [facility] schedule at [facility] and I will see him

## 2023-12-14 ENCOUNTER — OFFICE VISIT (OUTPATIENT)
Dept: DERMATOLOGY | Facility: CLINIC | Age: 68
End: 2023-12-14

## 2023-12-14 DIAGNOSIS — Z48.02 VISIT FOR SUTURE REMOVAL: Primary | ICD-10-CM

## 2023-12-14 PROCEDURE — RECHECK: Performed by: DERMATOLOGY

## 2023-12-14 NOTE — PROGRESS NOTES
[facility] Note     Patient Name: Christo Zarate  Encounter Date: 12/14/2023     Have you been cared for by a [facility] in the last 3 years and, if so, which description applies to you? Yes. I have been here within the last 3 years, and my medical history has NOT changed since that time. I am MALE/not capable of bearing children. REVIEW OF SYSTEMS:  Have you recently had or currently have any of the following? No changes in my recent health. PAST MEDICAL HISTORY:  Have you personally ever had or currently have any of the following? If "YES," then please provide more detail. No changes in my medical history. HISTORY OF IMMUNOSUPPRESSION: Do you have a history of any of the following:  Systemic Immunosuppression such as Diabetes, Biologic or Immunotherapy, Chemotherapy, Organ Transplantation, Bone Marrow Transplantation? No     Answering "YES" requires the addition of the dotphrase "IMMUNOSUPPRESSED" as the first diagnosis of the patient's visit. FAMILY HISTORY:  Any "first degree relatives" (parent, brother, sister, or child) with the following? No changes in my family's known health. PATIENT EXPERIENCE:    Do you want the Dermatologist to perform a COMPLETE skin exam today including a clinical examination under the "bra and underwear" areas? NO  If necessary, do we have your permission to call and leave a detailed message on your Preferred Phone number that includes your specific medical information?   Yes      Allergies   Allergen Reactions    Oxycodone-Acetaminophen Hives     Other reaction(s): rash    Codeine Hives     URTICARIA   Other reaction(s): rash and hives      Current Outpatient Medications:     amLODIPine (NORVASC) 10 mg tablet, Take 1 tablet (10 mg total) by mouth daily, Disp: 90 tablet, Rfl: 3    aspirin (ECOTRIN LOW STRENGTH) 81 mg EC tablet, Take 81 mg by mouth daily, Disp: , Rfl:     famotidine (PEPCID) 20 mg tablet, Take 20 mg by mouth 2 (two) times a day, Disp: , Rfl:     fluocinonide (LIDEX) 0.05 % cream, Apply topically 2 (two) times a day Apply twice daily for up to 2 weeks then as needed, Disp: 60 g, Rfl: 3    loratadine (CLARITIN) 10 mg tablet, Take 10 mg by mouth daily, Disp: , Rfl:     losartan (COZAAR) 100 MG tablet, Take 100 mg by mouth daily, Disp: , Rfl:     mirtazapine (REMERON) 15 mg tablet, Take 15 mg by mouth daily at bedtime, Disp: , Rfl:     mycophenolate (CELLCEPT) 250 mg capsule, TAKE 2 CAPSULES (500 MG) BY MOUTH TWO (2) TIMES A  DAY, Disp: , Rfl:     pravastatin (PRAVACHOL) 40 mg tablet, TAKE 1 TABLET (40 MG) BY MOUTH DAILY, Disp: , Rfl:     tacrolimus (PROGRAF) 0.5 mg capsule, Take 0.5 mg by mouth daily, Disp: , Rfl:     tacrolimus (PROGRAF) 1 mg capsule, Take 2 mg by mouth 2 (two) times a day, Disp: , Rfl:     tadalafil (CIALIS) 5 MG tablet, Take 1 tablet (5 mg total) by mouth daily, Disp: 90 tablet, Rfl: 3    tamsulosin (FLOMAX) 0.4 mg, Take 0.4 mg by mouth daily with dinner, Disp: , Rfl:           Whom besides the patient is providing clinical information about today's encounter? NO ADDITIONAL HISTORIAN (patient alone provided history)    Physical Exam and Assessment/Plan by Diagnosis:    Suture removal    Date/Time: 12/14/2023 10:00 AM    Performed by: Nakul Mckenna MA  Authorized by: Dacia Faustin MD  Universal Protocol:  Consent: Verbal consent obtained. Risks and benefits: risks, benefits and alternatives were discussed  Consent given by: patient  Timeout called at: 12/14/2023 9:20 AM.  Patient understanding: patient states understanding of the procedure being performed  Patient consent: the patient's understanding of the procedure matches consent given  Procedure consent: procedure consent matches procedure scheduled  Relevant documents: relevant documents present and verified  Test results: test results not available  Site marked: the operative site was not marked  Radiology Images displayed and confirmed. If images not available, report reviewed: imaging studies not available  Patient identity confirmed: verbally with patient      Patient location:  Clinic  Location:     Laterality:  Right    Location:  Trunk    Trunk location:  Flank    Flank location:  R flank  Procedure details: Tools used:  Suture removal kit    Wound appearance:  No sign(s) of infection and pink    Number of sutures removed:  1 (Running suture)  Post-procedure details:     Post-removal:  Dressing applied (Vaseline applied)    Patient tolerance of procedure:   Tolerated well, no immediate complications

## 2023-12-18 ENCOUNTER — TELEPHONE (OUTPATIENT)
Age: 68
End: 2023-12-18

## 2023-12-18 NOTE — TELEPHONE ENCOUNTER
Patient reports medication has been working for him, [facility] reports patients insurance will not cover the medication.      Please review and advise.     Patient requesting different medication or if current med needs prior auth- please start prior auth process.     Patient requesting call back for status update of medication.

## 2023-12-19 NOTE — TELEPHONE ENCOUNTER
This medication is not covered by most insurances. Prior auth will not change this. Please offer [employer] information to pt.

## 2024-01-15 PROCEDURE — 87636 SARSCOV2 & INF A&B AMP PRB: CPT | Performed by: FAMILY MEDICINE

## 2024-01-16 ENCOUNTER — LAB REQUISITION (OUTPATIENT)
Dept: LAB | Facility: HOSPITAL | Age: 69
End: 2024-01-16
Payer: MEDICARE

## 2024-01-16 DIAGNOSIS — R05.9 COUGH, UNSPECIFIED: ICD-10-CM

## 2024-01-16 LAB
FLUAV RNA RESP QL NAA+PROBE: NEGATIVE
FLUBV RNA RESP QL NAA+PROBE: NEGATIVE
SARS-COV-2 RNA RESP QL NAA+PROBE: NEGATIVE

## 2024-01-17 ENCOUNTER — APPOINTMENT (OUTPATIENT)
Dept: LAB | Facility: HOSPITAL | Age: 69
End: 2024-01-17
Payer: MEDICARE

## 2024-01-17 DIAGNOSIS — E55.9 VITAMIN D DEFICIENCY: ICD-10-CM

## 2024-01-17 DIAGNOSIS — E11.9 TYPE 2 DIABETES MELLITUS WITHOUT COMPLICATION, WITHOUT LONG-TERM CURRENT USE OF INSULIN (HCC): ICD-10-CM

## 2024-01-17 DIAGNOSIS — E03.9 ACQUIRED HYPOTHYROIDISM: ICD-10-CM

## 2024-01-17 DIAGNOSIS — I50.22 CHRONIC SYSTOLIC HEART FAILURE (HCC): ICD-10-CM

## 2024-01-17 DIAGNOSIS — J98.01 COUGH DUE TO BRONCHOSPASM: ICD-10-CM

## 2024-01-17 LAB
25(OH)D3 SERPL-MCNC: 29.2 NG/ML (ref 30–100)
ANION GAP SERPL CALCULATED.3IONS-SCNC: 9 MMOL/L
BASOPHILS # BLD AUTO: 0.03 THOUSANDS/ÂΜL (ref 0–0.1)
BASOPHILS NFR BLD AUTO: 0 % (ref 0–1)
BUN SERPL-MCNC: 20 MG/DL (ref 5–25)
CALCIUM SERPL-MCNC: 9.8 MG/DL (ref 8.4–10.2)
CHLORIDE SERPL-SCNC: 104 MMOL/L (ref 96–108)
CHOLEST SERPL-MCNC: 92 MG/DL
CO2 SERPL-SCNC: 26 MMOL/L (ref 21–32)
CREAT SERPL-MCNC: 1.23 MG/DL (ref 0.6–1.3)
EOSINOPHIL # BLD AUTO: 0.03 THOUSAND/ÂΜL (ref 0–0.61)
EOSINOPHIL NFR BLD AUTO: 0 % (ref 0–6)
ERYTHROCYTE [DISTWIDTH] IN BLOOD BY AUTOMATED COUNT: 13.2 % (ref 11.6–15.1)
EST. AVERAGE GLUCOSE BLD GHB EST-MCNC: 100 MG/DL
GFR SERPL CREATININE-BSD FRML MDRD: 59 ML/MIN/1.73SQ M
GLUCOSE SERPL-MCNC: 104 MG/DL (ref 65–140)
HBA1C MFR BLD: 5.1 %
HCT VFR BLD AUTO: 46.1 % (ref 36.5–49.3)
HDLC SERPL-MCNC: 41 MG/DL
HGB BLD-MCNC: 14.8 G/DL (ref 12–17)
IMM GRANULOCYTES # BLD AUTO: 0.06 THOUSAND/UL (ref 0–0.2)
IMM GRANULOCYTES NFR BLD AUTO: 1 % (ref 0–2)
LDLC SERPL CALC-MCNC: 37 MG/DL (ref 0–100)
LYMPHOCYTES # BLD AUTO: 2.19 THOUSANDS/ÂΜL (ref 0.6–4.47)
LYMPHOCYTES NFR BLD AUTO: 23 % (ref 14–44)
MCH RBC QN AUTO: 27.2 PG (ref 26.8–34.3)
MCHC RBC AUTO-ENTMCNC: 32.1 G/DL (ref 31.4–37.4)
MCV RBC AUTO: 85 FL (ref 82–98)
MONOCYTES # BLD AUTO: 0.67 THOUSAND/ÂΜL (ref 0.17–1.22)
MONOCYTES NFR BLD AUTO: 7 % (ref 4–12)
NEUTROPHILS # BLD AUTO: 6.47 THOUSANDS/ÂΜL (ref 1.85–7.62)
NEUTS SEG NFR BLD AUTO: 69 % (ref 43–75)
NONHDLC SERPL-MCNC: 51 MG/DL
NRBC BLD AUTO-RTO: 0 /100 WBCS
PLATELET # BLD AUTO: 208 THOUSANDS/UL (ref 149–390)
PMV BLD AUTO: 11 FL (ref 8.9–12.7)
POTASSIUM SERPL-SCNC: 4.7 MMOL/L (ref 3.5–5.3)
RBC # BLD AUTO: 5.45 MILLION/UL (ref 3.88–5.62)
SODIUM SERPL-SCNC: 139 MMOL/L (ref 135–147)
TRIGL SERPL-MCNC: 72 MG/DL
TSH SERPL DL<=0.05 MIU/L-ACNC: 0.84 UIU/ML (ref 0.45–4.5)
WBC # BLD AUTO: 9.45 THOUSAND/UL (ref 4.31–10.16)

## 2024-01-17 PROCEDURE — 80048 BASIC METABOLIC PNL TOTAL CA: CPT

## 2024-01-17 PROCEDURE — 80061 LIPID PANEL: CPT

## 2024-01-17 PROCEDURE — 82306 VITAMIN D 25 HYDROXY: CPT

## 2024-01-17 PROCEDURE — 84443 ASSAY THYROID STIM HORMONE: CPT

## 2024-01-17 PROCEDURE — 85025 COMPLETE CBC W/AUTO DIFF WBC: CPT

## 2024-01-17 PROCEDURE — 36415 COLL VENOUS BLD VENIPUNCTURE: CPT

## 2024-01-17 PROCEDURE — 83036 HEMOGLOBIN GLYCOSYLATED A1C: CPT

## 2024-02-02 ENCOUNTER — HOSPITAL ENCOUNTER (OUTPATIENT)
Dept: RADIOLOGY | Age: 69
Discharge: HOME | End: 2024-02-02
Payer: MEDICARE

## 2024-02-02 DIAGNOSIS — R91.1 LUNG NODULE: ICD-10-CM

## 2024-02-02 PROCEDURE — 71250 CT THORAX DX C-: CPT

## 2024-02-21 ENCOUNTER — TELEPHONE (OUTPATIENT)
Age: 69
End: 2024-02-21

## 2024-02-21 NOTE — TELEPHONE ENCOUNTER
[facility] called to make am appt for consult for hernia.ibuprofen asked what hospital he preferred and she is going to have pt call us back

## 2024-03-05 ENCOUNTER — CONSULT (OUTPATIENT)
Dept: SURGERY | Facility: CLINIC | Age: 69
End: 2024-03-05
Payer: MEDICARE

## 2024-03-05 VITALS
BODY MASS INDEX: 24.99 KG/M2 | HEIGHT: 65 IN | TEMPERATURE: 97 F | SYSTOLIC BLOOD PRESSURE: 126 MMHG | WEIGHT: 150 LBS | DIASTOLIC BLOOD PRESSURE: 80 MMHG | OXYGEN SATURATION: 99 % | HEART RATE: 107 BPM

## 2024-03-05 DIAGNOSIS — K40.90 UNILATERAL INGUINAL HERNIA WITHOUT OBSTRUCTION OR GANGRENE, RECURRENCE NOT SPECIFIED: Primary | ICD-10-CM

## 2024-03-05 PROCEDURE — 99204 OFFICE O/P NEW MOD 45 MIN: CPT | Performed by: SURGERY

## 2024-03-05 NOTE — H&P (VIEW-ONLY)
Assessment/Plan:  Left inguinal hernia, reducible.  Likely direct.  Discussed open versus laparoscopic inguinal hernia repair and he would prefer not to have general anesthesia.  Discussed open inguinal hernia repair under IV sedation and risks of procedure were explained including bleeding, infection, damage to surrounding structures and recurrence.  Informed consent was obtained.  Will need clearance from his cardiologist but he should maintain the same transplant meds and all of his home medications as scheduled.  IV antibiotics on-call  Cardiology clearance    No problem-specific Assessment & Plan notes found for this encounter.       Diagnoses and all orders for this visit:    Unilateral inguinal hernia without obstruction or gangrene, recurrence not specified          Subjective:      Patient ID: Chel Rea is a 68 y.o. male.    He presents with left inguinal hernia.  He has had a left groin bulge for a while, says occasionally it gets hard but is reducible.  Occasional pain in the area but not consistently.  He has no issues with nausea vomiting or constipation.  He has history of heart transplant done in 2017 at [facility].  He is maintained on tacrolimus and CellCept.  Stress echo from September 2023 shows EF of 65% with no ischemia.  He stays active and walks several miles a day, does not do any heavy lifting.  Recently saw his cardiologist few days ago.         The following portions of the patient's history were reviewed and updated as appropriate: He  has a past medical history of Benign prostatic hyperplasia and Hypertension.  He   Patient Active Problem List    Diagnosis Date Noted    Combined arterial insufficiency and corporo-venous occlusive erectile dysfunction 11/21/2023    Elevated PSA 10/16/2023    Benign prostatic hyperplasia with urinary obstruction 10/16/2023    Chronic systolic heart failure (HCC) 10/12/2023    Stage 3a chronic kidney disease (HCC) 10/12/2023    Heart transplant,  heterotopic, status (HCC) 02/11/2021    Hyperlipidemia LDL goal <100 02/11/2021    Thrombocytopenia (HCC) 07/08/2020    Left inguinal hernia 02/14/2018    Hypertension 07/28/2011     He  has a past surgical history that includes Cardiac surgery (07/2017).  His family history is not on file.  He  reports that he has never smoked. He has never used smokeless tobacco. He reports current alcohol use of about 1.0 standard drink of alcohol per week. He reports that he does not currently use drugs.  Current Outpatient Medications   Medication Sig Dispense Refill    amLODIPine (NORVASC) 10 mg tablet Take 1 tablet (10 mg total) by mouth daily 90 tablet 3    aspirin (ECOTRIN LOW STRENGTH) 81 mg EC tablet Take 81 mg by mouth daily      famotidine (PEPCID) 20 mg tablet Take 20 mg by mouth 2 (two) times a day      loratadine (CLARITIN) 10 mg tablet Take 10 mg by mouth daily      losartan (COZAAR) 100 MG tablet Take 100 mg by mouth daily      mirtazapine (REMERON) 15 mg tablet Take 15 mg by mouth daily at bedtime      mycophenolate (CELLCEPT) 250 mg capsule TAKE 2 CAPSULES (500 MG) BY MOUTH TWO (2) TIMES A  DAY      pravastatin (PRAVACHOL) 40 mg tablet TAKE 1 TABLET (40 MG) BY MOUTH DAILY      tacrolimus (PROGRAF) 0.5 mg capsule Take 0.5 mg by mouth daily      tacrolimus (PROGRAF) 1 mg capsule Take 2 mg by mouth 2 (two) times a day      tadalafil (CIALIS) 5 MG tablet Take 1 tablet (5 mg total) by mouth daily 90 tablet 3    tamsulosin (FLOMAX) 0.4 mg Take 0.4 mg by mouth daily with dinner      fluocinonide (LIDEX) 0.05 % cream Apply topically 2 (two) times a day Apply twice daily for up to 2 weeks then as needed 60 g 3     No current facility-administered medications for this visit.     He is allergic to oxycodone-acetaminophen and codeine..    Review of Systems   Respiratory:  Negative for shortness of breath.    Cardiovascular:  Negative for chest pain.   Gastrointestinal:  Positive for abdominal pain. Negative for constipation,  "nausea and vomiting.   All other systems reviewed and are negative.        Objective:      /80 (BP Location: Left arm, Patient Position: Sitting, Cuff Size: Adult)   Pulse (!) 107   Temp (!) 97 °F (36.1 °C) (Tympanic)   Ht 5' 5\" (1.651 m)   Wt 68 kg (150 lb)   SpO2 99%   BMI 24.96 kg/m²          Physical Exam  Vitals reviewed.   Constitutional:       General: He is not in acute distress.     Appearance: Normal appearance. He is normal weight.   HENT:      Head: Normocephalic and atraumatic.      Nose: Nose normal.      Mouth/Throat:      Mouth: Mucous membranes are moist.      Pharynx: Oropharynx is clear.   Eyes:      Extraocular Movements: Extraocular movements intact.      Conjunctiva/sclera: Conjunctivae normal.      Pupils: Pupils are equal, round, and reactive to light.   Cardiovascular:      Rate and Rhythm: Normal rate and regular rhythm.      Heart sounds: Normal heart sounds.   Pulmonary:      Effort: Pulmonary effort is normal. No respiratory distress.      Breath sounds: Normal breath sounds.   Abdominal:      General: Abdomen is flat. There is no distension.      Palpations: Abdomen is soft.      Tenderness: There is no abdominal tenderness. There is no guarding or rebound.      Hernia: A hernia is present. Hernia is present in the left inguinal area. There is no hernia in the umbilical area or right inguinal area.       Musculoskeletal:         General: No swelling or tenderness. Normal range of motion.      Cervical back: Normal range of motion and neck supple.   Skin:     General: Skin is warm and dry.   Neurological:      General: No focal deficit present.      Mental Status: He is alert and oriented to person, place, and time.             "

## 2024-03-05 NOTE — PROGRESS NOTES
Assessment/Plan:  Left inguinal hernia, reducible.  Likely direct.  Discussed open versus laparoscopic inguinal hernia repair and he would prefer not to have general anesthesia.  Discussed open inguinal hernia repair under IV sedation and risks of procedure were explained including bleeding, infection, damage to surrounding structures and recurrence.  Informed consent was obtained.  Will need clearance from his cardiologist but he should maintain the same transplant meds and all of his home medications as scheduled.  IV antibiotics on-call  Cardiology clearance    No problem-specific Assessment & Plan notes found for this encounter.       Diagnoses and all orders for this visit:    Unilateral inguinal hernia without obstruction or gangrene, recurrence not specified          Subjective:      Patient ID: Chel Rea is a 68 y.o. male.    He presents with left inguinal hernia.  He has had a left groin bulge for a while, says occasionally it gets hard but is reducible.  Occasional pain in the area but not consistently.  He has no issues with nausea vomiting or constipation.  He has history of heart transplant done in 2017 at [facility].  He is maintained on tacrolimus and CellCept.  Stress echo from September 2023 shows EF of 65% with no ischemia.  He stays active and walks several miles a day, does not do any heavy lifting.  Recently saw his cardiologist few days ago.         The following portions of the patient's history were reviewed and updated as appropriate: He  has a past medical history of Benign prostatic hyperplasia and Hypertension.  He   Patient Active Problem List    Diagnosis Date Noted    Combined arterial insufficiency and corporo-venous occlusive erectile dysfunction 11/21/2023    Elevated PSA 10/16/2023    Benign prostatic hyperplasia with urinary obstruction 10/16/2023    Chronic systolic heart failure (HCC) 10/12/2023    Stage 3a chronic kidney disease (HCC) 10/12/2023    Heart transplant,  heterotopic, status (HCC) 02/11/2021    Hyperlipidemia LDL goal <100 02/11/2021    Thrombocytopenia (HCC) 07/08/2020    Left inguinal hernia 02/14/2018    Hypertension 07/28/2011     He  has a past surgical history that includes Cardiac surgery (07/2017).  His family history is not on file.  He  reports that he has never smoked. He has never used smokeless tobacco. He reports current alcohol use of about 1.0 standard drink of alcohol per week. He reports that he does not currently use drugs.  Current Outpatient Medications   Medication Sig Dispense Refill    amLODIPine (NORVASC) 10 mg tablet Take 1 tablet (10 mg total) by mouth daily 90 tablet 3    aspirin (ECOTRIN LOW STRENGTH) 81 mg EC tablet Take 81 mg by mouth daily      famotidine (PEPCID) 20 mg tablet Take 20 mg by mouth 2 (two) times a day      loratadine (CLARITIN) 10 mg tablet Take 10 mg by mouth daily      losartan (COZAAR) 100 MG tablet Take 100 mg by mouth daily      mirtazapine (REMERON) 15 mg tablet Take 15 mg by mouth daily at bedtime      mycophenolate (CELLCEPT) 250 mg capsule TAKE 2 CAPSULES (500 MG) BY MOUTH TWO (2) TIMES A  DAY      pravastatin (PRAVACHOL) 40 mg tablet TAKE 1 TABLET (40 MG) BY MOUTH DAILY      tacrolimus (PROGRAF) 0.5 mg capsule Take 0.5 mg by mouth daily      tacrolimus (PROGRAF) 1 mg capsule Take 2 mg by mouth 2 (two) times a day      tadalafil (CIALIS) 5 MG tablet Take 1 tablet (5 mg total) by mouth daily 90 tablet 3    tamsulosin (FLOMAX) 0.4 mg Take 0.4 mg by mouth daily with dinner      fluocinonide (LIDEX) 0.05 % cream Apply topically 2 (two) times a day Apply twice daily for up to 2 weeks then as needed 60 g 3     No current facility-administered medications for this visit.     He is allergic to oxycodone-acetaminophen and codeine..    Review of Systems   Respiratory:  Negative for shortness of breath.    Cardiovascular:  Negative for chest pain.   Gastrointestinal:  Positive for abdominal pain. Negative for constipation,  "nausea and vomiting.   All other systems reviewed and are negative.        Objective:      /80 (BP Location: Left arm, Patient Position: Sitting, Cuff Size: Adult)   Pulse (!) 107   Temp (!) 97 °F (36.1 °C) (Tympanic)   Ht 5' 5\" (1.651 m)   Wt 68 kg (150 lb)   SpO2 99%   BMI 24.96 kg/m²          Physical Exam  Vitals reviewed.   Constitutional:       General: He is not in acute distress.     Appearance: Normal appearance. He is normal weight.   HENT:      Head: Normocephalic and atraumatic.      Nose: Nose normal.      Mouth/Throat:      Mouth: Mucous membranes are moist.      Pharynx: Oropharynx is clear.   Eyes:      Extraocular Movements: Extraocular movements intact.      Conjunctiva/sclera: Conjunctivae normal.      Pupils: Pupils are equal, round, and reactive to light.   Cardiovascular:      Rate and Rhythm: Normal rate and regular rhythm.      Heart sounds: Normal heart sounds.   Pulmonary:      Effort: Pulmonary effort is normal. No respiratory distress.      Breath sounds: Normal breath sounds.   Abdominal:      General: Abdomen is flat. There is no distension.      Palpations: Abdomen is soft.      Tenderness: There is no abdominal tenderness. There is no guarding or rebound.      Hernia: A hernia is present. Hernia is present in the left inguinal area. There is no hernia in the umbilical area or right inguinal area.       Musculoskeletal:         General: No swelling or tenderness. Normal range of motion.      Cervical back: Normal range of motion and neck supple.   Skin:     General: Skin is warm and dry.   Neurological:      General: No focal deficit present.      Mental Status: He is alert and oriented to person, place, and time.             "

## 2024-03-11 ENCOUNTER — ANESTHESIA EVENT (OUTPATIENT)
Dept: PERIOP | Facility: HOSPITAL | Age: 69
End: 2024-03-11
Payer: MEDICARE

## 2024-03-14 NOTE — PRE-PROCEDURE INSTRUCTIONS
Pre-Surgery Instructions:   Medication Instructions    amLODIPine (NORVASC) 10 mg tablet Take day of surgery.    aspirin (ECOTRIN LOW STRENGTH) 81 mg EC tablet Instructions provided by MD    famotidine (PEPCID) 20 mg tablet Take day of surgery.    loratadine (CLARITIN) 10 mg tablet Take day of surgery.    losartan (COZAAR) 100 MG tablet Hold day of surgery.    mirtazapine (REMERON) 15 mg tablet Take night before surgery    mycophenolate (CELLCEPT) 250 mg capsule Take day of surgery.    pravastatin (PRAVACHOL) 40 mg tablet Take day of surgery.    tacrolimus (PROGRAF) 0.5 mg capsule Take day of surgery.    tacrolimus (PROGRAF) 1 mg capsule If close to arrival time take    tadalafil (CIALIS) 5 MG tablet Take day of surgery.    tamsulosin (FLOMAX) 0.4 mg Take night before surgery   Medication instructions for day surgery reviewed. Please use only a sip of water to take your instructed medications. Avoid all over the counter vitamins, supplements and NSAIDS for one week prior to surgery per anesthesia guidelines. Tylenol is ok to take as needed.     You will receive a call one business day prior to surgery with an arrival time and hospital directions. If your surgery is scheduled on a Monday, the hospital will be calling you on the Friday prior to your surgery. If you have not heard from anyone by 8pm, please call the hospital supervisor through the hospital  at [phone]. ([place] [phone] or [place] [phone]).    Do not eat or drink anything after midnight the night before your surgery, including candy, mints, lifesavers, or chewing gum. Do not drink alcohol 24hrs before your surgery. Try not to smoke at least 24hrs before your surgery.       Follow the pre surgery showering instructions as listed in the “My Surgical Experience Booklet” or otherwise provided by your surgeon's office. Do not use a blade to shave the surgical area 1 week before surgery. It is okay to use a clean electric clippers up  to 24 hours before surgery. Do not apply any lotions, creams, including makeup, cologne, deodorant, or perfumes after showering on the day of your surgery. Do not use dry shampoo, hair spray, hair gel, or any type of hair products.     No contact lenses, eye make-up, or artificial eyelashes. Remove nail polish, including gel polish, and any artificial, gel, or acrylic nails if possible. Remove all jewelry including rings and body piercing jewelry.     Wear causal clothing that is easy to take on and off. Consider your type of surgery.    Keep any valuables, jewelry, piercings at home. Please bring any specially ordered equipment (sling, braces) if indicated.    Arrange for a responsible person to drive you to and from the hospital on the day of your surgery. Please confirm the visitor policy for the day of your procedure when you receive your phone call with an arrival time.     Call the surgeon's office with any new illnesses, exposures, or additional questions prior to surgery.    Please reference your “My Surgical Experience Booklet” for additional information to prepare for your upcoming surgery.

## 2024-03-20 ENCOUNTER — TELEPHONE (OUTPATIENT)
Age: 69
End: 2024-03-20

## 2024-03-20 NOTE — TELEPHONE ENCOUNTER
Meeta from [facility] called. The pt has an appt for cardiac clearance tomorrow 03/21/24. If you need to speak with her, please call her at [phone]

## 2024-03-22 ENCOUNTER — HOSPITAL ENCOUNTER (OUTPATIENT)
Facility: HOSPITAL | Age: 69
Setting detail: OUTPATIENT SURGERY
Discharge: HOME | End: 2024-03-22
Attending: SURGERY | Admitting: SURGERY
Payer: MEDICARE

## 2024-03-22 ENCOUNTER — ANESTHESIA (OUTPATIENT)
Dept: PERIOP | Facility: HOSPITAL | Age: 69
End: 2024-03-22
Payer: MEDICARE

## 2024-03-22 VITALS
TEMPERATURE: 97.1 F | SYSTOLIC BLOOD PRESSURE: 149 MMHG | HEART RATE: 104 BPM | RESPIRATION RATE: 18 BRPM | OXYGEN SATURATION: 96 % | DIASTOLIC BLOOD PRESSURE: 99 MMHG

## 2024-03-22 DIAGNOSIS — K40.90 LEFT INGUINAL HERNIA: Primary | ICD-10-CM

## 2024-03-22 PROCEDURE — 49505 PRP I/HERN INIT REDUC >5 YR: CPT | Performed by: SURGERY

## 2024-03-22 PROCEDURE — 49505 PRP I/HERN INIT REDUC >5 YR: CPT | Performed by: SPECIALIST

## 2024-03-22 PROCEDURE — C1781 MESH (IMPLANTABLE): HCPCS | Performed by: SURGERY

## 2024-03-22 DEVICE — POLYPROPYLENE NONABSORBABLE SYNTHETIC SURGICAL MESH
Type: IMPLANTABLE DEVICE | Site: INGUINAL | Status: FUNCTIONAL
Brand: PROLENE

## 2024-03-22 RX ORDER — FENTANYL CITRATE 50 UG/ML
INJECTION, SOLUTION INTRAMUSCULAR; INTRAVENOUS AS NEEDED
Status: DISCONTINUED | OUTPATIENT
Start: 2024-03-22 | End: 2024-03-22

## 2024-03-22 RX ORDER — BUPIVACAINE HYDROCHLORIDE 5 MG/ML
INJECTION, SOLUTION EPIDURAL; INTRACAUDAL AS NEEDED
Status: DISCONTINUED | OUTPATIENT
Start: 2024-03-22 | End: 2024-03-22 | Stop reason: HOSPADM

## 2024-03-22 RX ORDER — HYDROMORPHONE HCL IN WATER/PF 6 MG/30 ML
0.2 PATIENT CONTROLLED ANALGESIA SYRINGE INTRAVENOUS
Status: DISCONTINUED | OUTPATIENT
Start: 2024-03-22 | End: 2024-03-22 | Stop reason: HOSPADM

## 2024-03-22 RX ORDER — PROPOFOL 10 MG/ML
INJECTION, EMULSION INTRAVENOUS CONTINUOUS PRN
Status: DISCONTINUED | OUTPATIENT
Start: 2024-03-22 | End: 2024-03-22

## 2024-03-22 RX ORDER — ONDANSETRON 2 MG/ML
4 INJECTION INTRAMUSCULAR; INTRAVENOUS ONCE AS NEEDED
Status: DISCONTINUED | OUTPATIENT
Start: 2024-03-22 | End: 2024-03-22 | Stop reason: HOSPADM

## 2024-03-22 RX ORDER — PROPOFOL 10 MG/ML
INJECTION, EMULSION INTRAVENOUS AS NEEDED
Status: DISCONTINUED | OUTPATIENT
Start: 2024-03-22 | End: 2024-03-22

## 2024-03-22 RX ORDER — SODIUM CHLORIDE, SODIUM LACTATE, POTASSIUM CHLORIDE, CALCIUM CHLORIDE 600; 310; 30; 20 MG/100ML; MG/100ML; MG/100ML; MG/100ML
125 INJECTION, SOLUTION INTRAVENOUS CONTINUOUS
Status: DISCONTINUED | OUTPATIENT
Start: 2024-03-22 | End: 2024-03-22 | Stop reason: HOSPADM

## 2024-03-22 RX ORDER — MAGNESIUM HYDROXIDE 1200 MG/15ML
LIQUID ORAL AS NEEDED
Status: DISCONTINUED | OUTPATIENT
Start: 2024-03-22 | End: 2024-03-22 | Stop reason: HOSPADM

## 2024-03-22 RX ORDER — SODIUM CHLORIDE, SODIUM LACTATE, POTASSIUM CHLORIDE, CALCIUM CHLORIDE 600; 310; 30; 20 MG/100ML; MG/100ML; MG/100ML; MG/100ML
INJECTION, SOLUTION INTRAVENOUS CONTINUOUS PRN
Status: DISCONTINUED | OUTPATIENT
Start: 2024-03-22 | End: 2024-03-22

## 2024-03-22 RX ORDER — HYDROCODONE BITARTRATE AND ACETAMINOPHEN 5; 325 MG/1; MG/1
1 TABLET ORAL EVERY 6 HOURS PRN
Qty: 15 TABLET | Refills: 0 | Status: SHIPPED | OUTPATIENT
Start: 2024-03-22 | End: 2024-04-02

## 2024-03-22 RX ORDER — HYDROCODONE BITARTRATE AND ACETAMINOPHEN 5; 325 MG/1; MG/1
1 TABLET ORAL EVERY 6 HOURS PRN
Status: DISCONTINUED | OUTPATIENT
Start: 2024-03-22 | End: 2024-03-22 | Stop reason: HOSPADM

## 2024-03-22 RX ORDER — MIDAZOLAM HYDROCHLORIDE 2 MG/2ML
INJECTION, SOLUTION INTRAMUSCULAR; INTRAVENOUS AS NEEDED
Status: DISCONTINUED | OUTPATIENT
Start: 2024-03-22 | End: 2024-03-22

## 2024-03-22 RX ORDER — HYDROCODONE BITARTRATE AND ACETAMINOPHEN 5; 325 MG/1; MG/1
2 TABLET ORAL EVERY 6 HOURS PRN
Status: DISCONTINUED | OUTPATIENT
Start: 2024-03-22 | End: 2024-03-22 | Stop reason: HOSPADM

## 2024-03-22 RX ORDER — LIDOCAINE HYDROCHLORIDE 10 MG/ML
INJECTION, SOLUTION EPIDURAL; INFILTRATION; INTRACAUDAL; PERINEURAL AS NEEDED
Status: DISCONTINUED | OUTPATIENT
Start: 2024-03-22 | End: 2024-03-22 | Stop reason: HOSPADM

## 2024-03-22 RX ORDER — FENTANYL CITRATE/PF 50 MCG/ML
25 SYRINGE (ML) INJECTION
Status: DISCONTINUED | OUTPATIENT
Start: 2024-03-22 | End: 2024-03-22 | Stop reason: HOSPADM

## 2024-03-22 RX ORDER — LIDOCAINE HYDROCHLORIDE 10 MG/ML
0.5 INJECTION, SOLUTION EPIDURAL; INFILTRATION; INTRACAUDAL; PERINEURAL ONCE AS NEEDED
Status: DISCONTINUED | OUTPATIENT
Start: 2024-03-22 | End: 2024-03-22 | Stop reason: HOSPADM

## 2024-03-22 RX ORDER — EPHEDRINE SULFATE 50 MG/ML
INJECTION INTRAVENOUS AS NEEDED
Status: DISCONTINUED | OUTPATIENT
Start: 2024-03-22 | End: 2024-03-22

## 2024-03-22 RX ORDER — LIDOCAINE HYDROCHLORIDE 10 MG/ML
INJECTION, SOLUTION EPIDURAL; INFILTRATION; INTRACAUDAL; PERINEURAL AS NEEDED
Status: DISCONTINUED | OUTPATIENT
Start: 2024-03-22 | End: 2024-03-22

## 2024-03-22 RX ORDER — CEFAZOLIN SODIUM 1 G/50ML
1000 SOLUTION INTRAVENOUS ONCE
Status: COMPLETED | OUTPATIENT
Start: 2024-03-22 | End: 2024-03-22

## 2024-03-22 RX ORDER — ONDANSETRON 2 MG/ML
4 INJECTION INTRAMUSCULAR; INTRAVENOUS EVERY 4 HOURS PRN
Status: DISCONTINUED | OUTPATIENT
Start: 2024-03-22 | End: 2024-03-22 | Stop reason: HOSPADM

## 2024-03-22 RX ORDER — ACETAMINOPHEN 325 MG/1
650 TABLET ORAL EVERY 6 HOURS PRN
Status: DISCONTINUED | OUTPATIENT
Start: 2024-03-22 | End: 2024-03-22 | Stop reason: HOSPADM

## 2024-03-22 RX ORDER — DEXAMETHASONE SODIUM PHOSPHATE 10 MG/ML
INJECTION, SOLUTION INTRAMUSCULAR; INTRAVENOUS AS NEEDED
Status: DISCONTINUED | OUTPATIENT
Start: 2024-03-22 | End: 2024-03-22

## 2024-03-22 RX ADMIN — PROPOFOL 100 MCG/KG/MIN: 10 INJECTION, EMULSION INTRAVENOUS at 09:41

## 2024-03-22 RX ADMIN — FENTANYL CITRATE 25 MCG: 50 INJECTION, SOLUTION INTRAMUSCULAR; INTRAVENOUS at 09:49

## 2024-03-22 RX ADMIN — EPHEDRINE SULFATE 10 MG: 50 INJECTION INTRAVENOUS at 10:11

## 2024-03-22 RX ADMIN — SODIUM CHLORIDE, SODIUM LACTATE, POTASSIUM CHLORIDE, AND CALCIUM CHLORIDE: .6; .31; .03; .02 INJECTION, SOLUTION INTRAVENOUS at 09:16

## 2024-03-22 RX ADMIN — DEXAMETHASONE SODIUM PHOSPHATE 10 MG: 10 INJECTION, SOLUTION INTRAMUSCULAR; INTRAVENOUS at 09:46

## 2024-03-22 RX ADMIN — MIDAZOLAM 2 MG: 1 INJECTION INTRAMUSCULAR; INTRAVENOUS at 09:32

## 2024-03-22 RX ADMIN — PROPOFOL 50 MG: 10 INJECTION, EMULSION INTRAVENOUS at 10:36

## 2024-03-22 RX ADMIN — LIDOCAINE HYDROCHLORIDE 50 MG: 10 INJECTION, SOLUTION EPIDURAL; INFILTRATION; INTRACAUDAL; PERINEURAL at 09:41

## 2024-03-22 RX ADMIN — FENTANYL CITRATE 25 MCG: 50 INJECTION, SOLUTION INTRAMUSCULAR; INTRAVENOUS at 09:53

## 2024-03-22 RX ADMIN — FENTANYL CITRATE 50 MCG: 50 INJECTION, SOLUTION INTRAMUSCULAR; INTRAVENOUS at 09:43

## 2024-03-22 RX ADMIN — CEFAZOLIN SODIUM 1000 MG: 1 SOLUTION INTRAVENOUS at 09:39

## 2024-03-22 RX ADMIN — FENTANYL CITRATE 25 MCG: 50 INJECTION, SOLUTION INTRAMUSCULAR; INTRAVENOUS at 11:43

## 2024-03-22 NOTE — INTERVAL H&P NOTE
H&P reviewed. After examining the patient I find no changes in the patients condition since the H&P had been written.    Vitals:    03/22/24 0758   BP: 135/93   Pulse: 98   Resp: 18   Temp: (!) 97 °F (36.1 °C)   SpO2: 100%

## 2024-03-22 NOTE — NURSING NOTE
Pt able to tolerate po intake. Pt in no pain. AVS given; pt verbalized understanding. IV removed. Pt voided.

## 2024-03-22 NOTE — DISCHARGE INSTR - AVS FIRST PAGE
[facility] Discharge Instructions      1. General: You will feel pulling sensations around the wound or funny aches and pains around the incisions. You may have some bruising or mild redness. This is normal. Even minor surgery is a change in your body and this is your body’s reaction to it. If you have had abdominal surgery, it may help to support the incision with a small pillow or blanket for comfort when moving or coughing. There may be some hardness surrounding your incision which is your body's normal reaction to surgery. This typically softens up over time. You may also experience itching as the incision heals. A heating pad or ice pack can also be beneficial in the post-op period.     2. Wound care: Make sure to remove the overlying dressing/bandage in about 24 hours, unless instructed otherwise. You usually don't have to redress the wound after 24-48 hours, unless for comfort. Keep the incision clean and dry. Let air get to it. If this Steri-Strips fall off, just keep the wound clean. If there is surgical glue in place this will usually start to soften in around 2 weeks and will eventually dissolve or fall off with gentle scrubbing in the shower.    3. Water: You may shower over the wound, unless there are drain tubes left in place. Do not bathe or use a pool or hot tub until cleared by the physician. Do not swim in a lake or ocean until cleared by your physician. You may shower right over the staples or Steri-Strips and pat dry when you are done.    4. Activity: You may go up and down stairs, walk as much as you are comfortable, but walk at least 3 times each day. If you have had abdominal surgery, do not lift anything heavier than 10-15 pounds for at least 2 weeks, unless cleared by the doctor. Notes and paperwork for your job are typically filled out at your post-op appointment but if there is a time constraint please call the office for assistance if this is needed prior to your post-op  check.    5. Diet: You may resume a regular diet. If you had a same-day surgery or overnight stay surgery, you may wish to eat lightly for a few days: soups, crackers, and sandwiches. You may resume a regular diet when ready. If you have had gallbladder surgery, you should remain on a low fat diet for 2 weeks or at least until your post op appointment.     6. Medications: Resume all of your previous medications, unless told otherwise by the doctor. Ibuprofen (Advil, Motrin, etc.) is usually ok unless specifically discouraged by your surgeon. 400-600 mg of ibuprofen every 6 hours for post-surgical pain is an acceptable regimen with a maximum dosage of 2400 mg per day. Avoid ibuprofen if you are taking aspirin. If you have a bleeding disorder or have stomach issues, talk to your surgeon prior to use. Tylenol is ok, unless you are taking any narcotic pain medication containing Tylenol (such as Percocet, Darvocet, Vicodin, or anything containing acetaminophen). Be cautious taking additional Tylenol if you're taking these medications as there is a maximum dosage of 4,000 mg of Tylenol per day. You do not need to take the narcotic pain medications unless you are having significant pain and discomfort.    7. Driving: You will need someone to drive you home on the day of surgery. Do not drive or make any important decisions while on narcotic pain medication or 24 hours and after anesthesia or sedation for surgery. Generally, you may drive when you are off all narcotic pain medications.    8. Upset Stomach: You may take Maalox, Tums, or similar items for an upset stomach. If your narcotic pain medication causes an upset stomach, do not take it on an empty stomach. Try taking it with at least some crackers or toast.     9. Constipation: Patients often experience constipation after surgery due to anesthesia and pain medication. This is especially common after abdominal surgery. You may take over-the-counter medication for  this, such as Metamucil, Senokot, Dulcolax, milk of magnesia, etc. You may take a suppository unless you have had anorectal surgery such as a procedure on your hemorrhoids. If you experience significant nausea or vomiting after abdominal surgery, call the office before trying any of these medications.    10. Pain: You may be given a prescription for pain. This will be prescribed the day of surgery and sent to your pharmacy of choice. Take the pain medication as prescribed, only if you need it. Narcotic pain medications (such as anything containing hydrocodone or oxycodone) have many side effects such as nausea/vomiting, constipation, dizziness and respiratory depression. Do not drive when taking the pain medication. Do not take more than prescribed in the 4-6 hour time period.    11. Sexual Activity: You may resume sexual activity when you feel ready and comfortable and your incision is sealed and healed without apparent infection risk.    12. Urination: If you haven't urinated in 6 hours and are unable to urinate please call the office. If you are having pain and can not urinate you need to be evaluated by a physician and should go to the emergency room.     Call the office: If you are experiencing any of the following, fevers above 101.5°, significant nausea or vomiting, if the wound develops drainage and/or has excessive redness around the wound, or if you have significant diarrhea or other worsening symptoms.

## 2024-03-22 NOTE — OP NOTE
OPERATIVE REPORT  PATIENT NAME: Chel Rea    :  1955  MRN: [id number]  Pt Location:  OR ROOM 10    SURGERY DATE: 3/22/2024    Surgeons and Role:     * Janet Chavez MD - Primary     * Luc Skinner MD - Assisting    Preop Diagnosis:  Left inguinal hernia [K40.90]    Post-Op Diagnosis Codes:     * Left inguinal hernia [K40.90]    Procedure(s):  Left - OPEN LEFT INGUINAL HERNIA REPAIR WITH MESH    Specimen(s):  * No specimens in log *    Estimated Blood Loss:   Minimal    Drains:  * No LDAs found *    Anesthesia Type:   IV Sedation with Anesthesia    Operative Indications:  Left inguinal hernia [K40.90]      Operative Findings:  Large direct inguinal hernia     Complications:   None    Procedure and Technique:    The patient was identified in the Holding Room. The site of surgery was properly noted/marked. The patient was taken to the Operating Room, identified as Chel Rea and the procedure verified.    The patient was prepped and draped in a sterile fashion. Anesthesia was induced. A preoperative time-out was conducted confirming correct patient, procedure, side and that all necessary equipment and personnel were functioning present in the operating room. An inguinal incision was made with a scalpel after local anesthetic was injected.  Dissection was carried down through Saul's fascia with electrocautery until the external oblique aponeurosis was identified.  The external oblique fascia was opened sharply along the length of its fibers with a scalpel.  Medial and lateral flaps were created and retracted. Metzenbaum scissors were then used to open the inguinal canal superiorly and inferiorly down to the external ring. Care was taken to preserve the sensory nerves.  The cord structures were brought out of the wound and secured using a Penrose drain. The cord was carefully inspected for the evidence of a hernia sac. The inguinal floor was obliterated and there was a large direct hernia that  contained fat and the inferior epigastric vessels. The hernia sac was identified and dissected off the cord structures.  The transversalis fascia was opened and a pocket was created for the mesh. A Prolene Hernia System mesh was selected and positioned with the circular mesh within the direct hernia defect in the preperitoneal space.  The oblong mesh was unfurled under the external oblique fascia. A defect was cut into the mesh for the spermatic cord. The mesh was then secured to the pubic tubercle, shelving edge and conjoint tendon using interrupted 2-0 Prolene sutures.  The cord was then positioned within the mesh defect and the edges of the mesh were reapproximated.    The cord structures were returned to their anatomic location and penrose drain removed.  The wound was irrigated.  The external oblique was closed using a running 3-0 Vicryl suture.   Local anesthesia was used in the incision as well as performing an ilioinguinal nerve block. The wound was closed in multiple layers using 3-0 Vicryl sutures and the skin closed using a 4-0 Monocryl subcuticular stitch. The wound was dressed.  The patient tolerated the procedure in good condition.    Instrument, sponge, and needle counts were correct prior to closure and at the conclusion of the case.       I was present for the entire procedure., A qualified resident physician was not available., and A co-surgeon was required for dissection, retraction and suturing.     Patient Disposition:  PACU  and hemodynamically stable        SIGNATURE: Janet Chavez MD  DATE: March 22, 2024  TIME: 11:19 AM

## 2024-03-22 NOTE — ANESTHESIA PREPROCEDURE EVALUATION
Procedure:  OPEN LEFT INGUINAL HERNIA REPAIR WITH MESH (Left: Groin)    Relevant Problems   ANESTHESIA   (-) History of anesthesia complications      CARDIO   (+) Hyperlipidemia LDL goal <100   (+) Hypertension   (-) Chest pain   (-) RAYA (dyspnea on exertion)      /RENAL   (+) Benign prostatic hyperplasia with urinary obstruction   (+) Stage 3a chronic kidney disease (HCC)      HEMATOLOGY   (+) Thrombocytopenia (HCC)      PULMONARY   (-) Shortness of breath   (-) Sleep apnea   (-) Smoking   (-) URI (upper respiratory infection)      Surgery/Wound/Pain   (+) Heart transplant, heterotopic, status (HCC)      Stress Echo 9/27/23:  3 min, 38 sec  LVEF: 65%, normal hemodynamic response  Non ischemic  Physical Exam    Airway    Mallampati score: II  TM Distance: >3 FB  Neck ROM: full     Dental    upper dentures    Cardiovascular      Pulmonary      Other Findings        Anesthesia Plan  ASA Score- 3     Anesthesia Type- IV sedation with anesthesia with ASA Monitors.         Additional Monitors:     Airway Plan:            Plan Factors-Exercise tolerance (METS): >4 METS.    Chart reviewed. EKG reviewed.  Existing labs reviewed. Patient summary reviewed.          Obstructive sleep apnea risk education given perioperatively.        Induction- intravenous.    Postoperative Plan-     Informed Consent- Anesthetic plan and risks discussed with patient.  I personally reviewed this patient with the CRNA. Discussed and agreed on the Anesthesia Plan with the CRNA..

## 2024-03-22 NOTE — ANESTHESIA POSTPROCEDURE EVALUATION
Post-Op Assessment Note    CV Status:  Stable  Pain Score: 0    Pain management: adequate    Multimodal analgesia used between 6 hours prior to anesthesia start to PACU discharge    Mental Status:  Alert   Hydration Status:  Stable   PONV Controlled:  None   Airway Patency:  Patent    No anethesia notable event occurred.                BP   119/76   Temp 98.8 °F (37.1 °C) (03/22/24 1106)    Pulse  109   Resp   15   SpO2   99

## 2024-03-22 NOTE — NURSING NOTE
Pt was waiting for transport. Pt able to void. No apparent distress. AVS given; he verbalized understanding.

## 2024-03-27 ENCOUNTER — NURSE TRIAGE (OUTPATIENT)
Age: 69
End: 2024-03-27

## 2024-03-28 ENCOUNTER — TELEPHONE (OUTPATIENT)
Dept: CARDIOLOGY CLINIC | Facility: CLINIC | Age: 69
End: 2024-03-28

## 2024-03-28 NOTE — TELEPHONE ENCOUNTER
Senior Luz Sawyer called to verify why note from 3/1 was not signed off. Visit was marked as a missed appointment. I did advise the nurse.

## 2024-04-02 ENCOUNTER — OFFICE VISIT (OUTPATIENT)
Dept: SURGERY | Facility: CLINIC | Age: 69
End: 2024-04-02

## 2024-04-02 VITALS
TEMPERATURE: 97.8 F | OXYGEN SATURATION: 98 % | BODY MASS INDEX: 24.99 KG/M2 | WEIGHT: 150 LBS | HEART RATE: 110 BPM | HEIGHT: 65 IN

## 2024-04-02 DIAGNOSIS — Z98.890 POST-OPERATIVE STATE: Primary | ICD-10-CM

## 2024-04-02 PROCEDURE — 99024 POSTOP FOLLOW-UP VISIT: CPT | Performed by: SURGERY

## 2024-04-02 NOTE — PROGRESS NOTES
"Assessment/Plan:  Status post open left inguinal hernia repair with mesh, doing well.  Postoperative pain regimen discussed and he can use ice or heat as well as anti-inflammatories or Tylenol.  Continue no heavy lifting or strenuous physical activity for another 2 to 4 weeks.  Wound care instructions given, follow-up as needed.  No problem-specific Assessment & Plan notes found for this encounter.       Diagnoses and all orders for this visit:    Post-operative state          Subjective:      Patient ID: Chel Rea is a 68 y.o. male.    He presents for postop status post open left inguinal hernia repair with mesh.  He had some postoperative pain initially and required refill of his pain medicine, says the pain was mainly at night in the morning when he woke up.  He has been avoiding any significant physical activity and any heavy lifting.  Has a little bit of swelling around the incision but no bruising and no scrotal swelling.  He has some discomfort when he sits for long periods of time but otherwise is doing well.        The following portions of the patient's history were reviewed and updated as appropriate: allergies, current medications, past family history, past medical history, past social history, past surgical history, and problem list.    Review of Systems   Gastrointestinal:  Positive for abdominal pain.   All other systems reviewed and are negative.        Objective:      Pulse (!) 110   Temp 97.8 °F (36.6 °C) (Tympanic)   Ht 5' 5\" (1.651 m)   Wt 68 kg (150 lb)   SpO2 98%   BMI 24.96 kg/m²          Physical Exam  Vitals reviewed.   Constitutional:       General: He is not in acute distress.     Appearance: Normal appearance.   HENT:      Head: Normocephalic and atraumatic.      Nose: Nose normal.      Mouth/Throat:      Mouth: Mucous membranes are moist.      Pharynx: Oropharynx is clear.   Eyes:      Extraocular Movements: Extraocular movements intact.      Conjunctiva/sclera: Conjunctivae " normal.      Pupils: Pupils are equal, round, and reactive to light.   Pulmonary:      Effort: Pulmonary effort is normal. No respiratory distress.   Abdominal:      General: Abdomen is flat.      Palpations: Abdomen is soft.       Musculoskeletal:         General: No swelling or tenderness. Normal range of motion.      Cervical back: Normal range of motion and neck supple.   Skin:     General: Skin is warm and dry.   Neurological:      General: No focal deficit present.      Mental Status: He is alert and oriented to person, place, and time.

## 2024-05-14 ENCOUNTER — LAB (OUTPATIENT)
Dept: LAB | Facility: HOSPITAL | Age: 69
End: 2024-05-14
Payer: MEDICARE

## 2024-05-14 DIAGNOSIS — J98.01 COUGH DUE TO BRONCHOSPASM: ICD-10-CM

## 2024-05-14 DIAGNOSIS — R97.20 ELEVATED PSA: ICD-10-CM

## 2024-05-14 DIAGNOSIS — Z94.1 S/P ORTHOTOPIC HEART TRANSPLANT (HCC): ICD-10-CM

## 2024-05-14 PROCEDURE — 84154 ASSAY OF PSA FREE: CPT

## 2024-05-14 PROCEDURE — 84153 ASSAY OF PSA TOTAL: CPT

## 2024-05-14 PROCEDURE — 36415 COLL VENOUS BLD VENIPUNCTURE: CPT

## 2024-05-15 LAB
PSA FREE MFR SERPL: 26.1 %
PSA FREE SERPL-MCNC: 5.98 NG/ML
PSA SERPL-MCNC: 22.9 NG/ML (ref 0–4)

## 2024-05-21 ENCOUNTER — OFFICE VISIT (OUTPATIENT)
Dept: UROLOGY | Facility: MEDICAL CENTER | Age: 69
End: 2024-05-21
Payer: MEDICARE

## 2024-05-21 VITALS
HEIGHT: 65 IN | HEART RATE: 103 BPM | WEIGHT: 150 LBS | BODY MASS INDEX: 24.99 KG/M2 | OXYGEN SATURATION: 97 % | DIASTOLIC BLOOD PRESSURE: 70 MMHG | SYSTOLIC BLOOD PRESSURE: 110 MMHG

## 2024-05-21 DIAGNOSIS — N30.00 ACUTE CYSTITIS WITHOUT HEMATURIA: Primary | ICD-10-CM

## 2024-05-21 DIAGNOSIS — N18.31 STAGE 3A CHRONIC KIDNEY DISEASE (HCC): ICD-10-CM

## 2024-05-21 DIAGNOSIS — N13.8 BENIGN PROSTATIC HYPERPLASIA WITH URINARY OBSTRUCTION: ICD-10-CM

## 2024-05-21 DIAGNOSIS — R97.20 ELEVATED PSA: ICD-10-CM

## 2024-05-21 DIAGNOSIS — N40.1 BENIGN PROSTATIC HYPERPLASIA WITH URINARY OBSTRUCTION: ICD-10-CM

## 2024-05-21 LAB
POST-VOID RESIDUAL VOLUME, ML POC: 42 ML
SL AMB  POCT GLUCOSE, UA: ABNORMAL
SL AMB LEUKOCYTE ESTERASE,UA: ABNORMAL
SL AMB POCT BILIRUBIN,UA: ABNORMAL
SL AMB POCT BLOOD,UA: ABNORMAL
SL AMB POCT CLARITY,UA: ABNORMAL
SL AMB POCT COLOR,UA: YELLOW
SL AMB POCT KETONES,UA: ABNORMAL
SL AMB POCT NITRITE,UA: ABNORMAL
SL AMB POCT PH,UA: 5.5
SL AMB POCT SPECIFIC GRAVITY,UA: >=1.03
SL AMB POCT URINE PROTEIN: ABNORMAL
SL AMB POCT UROBILINOGEN: 0.2

## 2024-05-21 PROCEDURE — 51798 US URINE CAPACITY MEASURE: CPT | Performed by: UROLOGY

## 2024-05-21 PROCEDURE — 87077 CULTURE AEROBIC IDENTIFY: CPT | Performed by: UROLOGY

## 2024-05-21 PROCEDURE — 87086 URINE CULTURE/COLONY COUNT: CPT | Performed by: UROLOGY

## 2024-05-21 PROCEDURE — 81003 URINALYSIS AUTO W/O SCOPE: CPT | Performed by: UROLOGY

## 2024-05-21 PROCEDURE — 99214 OFFICE O/P EST MOD 30 MIN: CPT | Performed by: UROLOGY

## 2024-05-21 PROCEDURE — 87186 SC STD MICRODIL/AGAR DIL: CPT | Performed by: UROLOGY

## 2024-05-21 RX ORDER — CEPHALEXIN 500 MG/1
500 CAPSULE ORAL EVERY 12 HOURS SCHEDULED
Qty: 20 CAPSULE | Refills: 0 | Status: SHIPPED | OUTPATIENT
Start: 2024-05-21 | End: 2024-05-31

## 2024-05-21 RX ORDER — CEPHALEXIN 500 MG/1
500 CAPSULE ORAL EVERY 12 HOURS SCHEDULED
Qty: 20 CAPSULE | Refills: 0 | Status: SHIPPED | OUTPATIENT
Start: 2024-05-21 | End: 2024-05-21 | Stop reason: SDUPTHER

## 2024-05-21 NOTE — PROGRESS NOTES
Assessment/Plan:    Acute cystitis without hematuria  He is having dysuria and suprapubic discomfort and urinalysis is consistent with infection.  We will send urine for culture and treat empirically.  Keflex 500 mg p.o. twice daily x 10 days was prescribed.    Benign prostatic hyperplasia with urinary obstruction  AUA symptom score is 12 on tamsulosin.  Postvoid residual is satisfactory today.  We will continue to follow and reassess post antibiotic therapy.  Prostate is very large on MRI.    Elevated PSA  Repeat PSA was 22.9 (May 14, 2024).  Urinary infection may be contributing.  We will treat his infection and plan to repeat the PSA in 3 months.         Diagnoses and all orders for this visit:    Acute cystitis without hematuria  -     Urine culture  -     cephalexin (KEFLEX) 500 mg capsule; Take 1 capsule (500 mg total) by mouth every 12 (twelve) hours for 10 days  -     Urinalysis with microscopic; Future  -     Urine culture; Future    Elevated PSA  -     PSA, total and free; Future  -     Urinalysis with microscopic; Future    Benign prostatic hyperplasia with urinary obstruction  -     POCT urine dip auto non-scope  -     POCT Measure PVR    Stage 3a chronic kidney disease (HCC)            Subjective:      Patient ID: Chel Rea is a 68 y.o. male.    Benign Prostatic Hypertrophy  This is a chronic problem. The current episode started more than 1 year ago. The problem has been gradually worsening since onset. Irritative symptoms include nocturia (Nocturia x3). Irritative symptoms do not include frequency or urgency (occasional). Obstructive symptoms include a slower stream. Obstructive symptoms do not include dribbling, incomplete emptying, an intermittent stream, straining or a weak stream. Associated symptoms include dysuria. Pertinent negatives include no chills, hematuria or hesitancy. AUA score is 8-19. He is sexually active. The symptoms are aggravated by alcohol. Past treatments include tamsulosin.  The treatment provided moderate relief. He has been using treatment for 2 or more years.   Erectile Dysfunction  This is a chronic problem. The current episode started more than 1 year ago. The problem has been gradually worsening since onset. The nature of his difficulty is maintaining erection. He reports no decreased libido. Irritative symptoms include nocturia (Nocturia x3). Irritative symptoms do not include frequency or urgency (occasional). Obstructive symptoms include a slower stream. Obstructive symptoms do not include dribbling, incomplete emptying, an intermittent stream, straining or a weak stream. Associated symptoms include dysuria. Pertinent negatives include no chills, hematuria, hesitancy or inability to urinate. Nothing aggravates the symptoms. Past treatments include tadalafil. The treatment provided significant relief.   Since his last visit the patient underwent left inguinal hernia repair.  He notes he was catheterized at the time of surgery.  Since that time he has been having suprapubic pain with urination and dysuria.  Urine has been orange in color.  No fever or testicular pain at this time.    The following portions of the patient's history were reviewed and updated as appropriate: allergies, current medications, past family history, past medical history, past social history, past surgical history, and problem list.    Review of Systems   Constitutional:  Negative for chills, diaphoresis, fatigue and fever.   HENT: Negative.     Eyes: Negative.    Respiratory: Negative.     Cardiovascular: Negative.    Endocrine: Negative.    Genitourinary:  Positive for dysuria and nocturia (Nocturia x3). Negative for decreased libido, frequency, hematuria, hesitancy, incomplete emptying and urgency (occasional).        See HPI   Musculoskeletal: Negative.    Skin: Negative.    Allergic/Immunologic: Negative.    Neurological: Negative.    Hematological: Negative.    Psychiatric/Behavioral: Negative.        "  AUA SYMPTOM SCORE      Flowsheet Row Most Recent Value   AUA SYMPTOM SCORE    How often have you had a sensation of not emptying your bladder completely after you finished urinating? 1 (P)     How often have you had to urinate again less than two hours after you finished urinating? 2 (P)     How often have you found you stopped and started again several times when you urinate? 2 (P)     How often have you found it difficult to postpone urination? 1 (P)     How often have you had a weak urinary stream? 2 (P)     How often have you had to push or strain to begin urination? 3 (P)     How many times did you most typically get up to urinate from the time you went to bed at night until the time you got up in the morning? 1 (P)     Quality of Life: If you were to spend the rest of your life with your urinary condition just the way it is now, how would you feel about that? 4 (P)     AUA SYMPTOM SCORE 12 (P)               Objective:      /70 (BP Location: Left arm, Patient Position: Sitting, Cuff Size: Standard)   Pulse 103   Ht 5' 5\" (1.651 m)   Wt 68 kg (150 lb)   SpO2 97%   BMI 24.96 kg/m²          Physical Exam  Constitutional:       General: He is not in acute distress.     Appearance: Normal appearance. He is well-developed and normal weight. He is not ill-appearing, toxic-appearing or diaphoretic.   HENT:      Head: Normocephalic and atraumatic.   Eyes:      General: No scleral icterus.     Conjunctiva/sclera: Conjunctivae normal.   Cardiovascular:      Rate and Rhythm: Normal rate.   Pulmonary:      Effort: Pulmonary effort is normal.   Abdominal:      General: Abdomen is flat. There is no distension.      Palpations: There is no mass.      Tenderness: There is abdominal tenderness. There is no right CVA tenderness, left CVA tenderness, guarding or rebound.      Hernia: No hernia is present.      Comments: Sp tenderness   Genitourinary:     Penis: Normal.       Testes: Normal.   Musculoskeletal:      " Cervical back: Neck supple.   Skin:     General: Skin is warm and dry.   Neurological:      Mental Status: He is alert and oriented to person, place, and time.   Psychiatric:         Behavior: Behavior normal.         Thought Content: Thought content normal.         Judgment: Judgment normal.

## 2024-05-21 NOTE — ASSESSMENT & PLAN NOTE
He is having dysuria and suprapubic discomfort and urinalysis is consistent with infection.  We will send urine for culture and treat empirically.  Keflex 500 mg p.o. twice daily x 10 days was prescribed.

## 2024-05-21 NOTE — ASSESSMENT & PLAN NOTE
AUA symptom score is 12 on tamsulosin.  Postvoid residual is satisfactory today.  We will continue to follow and reassess post antibiotic therapy.  Prostate is very large on MRI.

## 2024-05-21 NOTE — ASSESSMENT & PLAN NOTE
Repeat PSA was 22.9 (May 14, 2024).  Urinary infection may be contributing.  We will treat his infection and plan to repeat the PSA in 3 months.

## 2024-05-23 ENCOUNTER — TELEPHONE (OUTPATIENT)
Dept: CARDIOLOGY CLINIC | Facility: CLINIC | Age: 69
End: 2024-05-23

## 2024-05-23 ENCOUNTER — TELEPHONE (OUTPATIENT)
Dept: UROLOGY | Facility: MEDICAL CENTER | Age: 69
End: 2024-05-23

## 2024-05-23 ENCOUNTER — TELEPHONE (OUTPATIENT)
Age: 69
End: 2024-05-23

## 2024-05-23 DIAGNOSIS — N30.00 ACUTE CYSTITIS WITHOUT HEMATURIA: Primary | ICD-10-CM

## 2024-05-23 LAB — BACTERIA UR CULT: ABNORMAL

## 2024-05-23 RX ORDER — CIPROFLOXACIN 500 MG/1
500 TABLET, FILM COATED ORAL 2 TIMES DAILY
Qty: 20 TABLET | Refills: 0 | Status: SHIPPED | OUTPATIENT
Start: 2024-05-23 | End: 2024-06-02

## 2024-05-23 NOTE — RESULT ENCOUNTER NOTE
Inform pt of abnormal test result.  Urine culture is positive.  The Keflex prescribed does not look like it will work.  I prescribed Cipro 500 mg twice daily x 10 days.  The Cipro may make his tacrolimus levels higher.  He might wish to discuss this with the doctors to manage this medication.

## 2024-05-23 NOTE — TELEPHONE ENCOUNTER
Patient on Cipro for 10 days and wanted Dr Junior's advisement on taking with Prograf. Per Dr Junior, patient can decrease am or pm dose by 1 mg while on Cipro. Patient called.

## 2024-05-23 NOTE — TELEPHONE ENCOUNTER
----- Message from Reyes Early MD sent at 5/23/2024 12:39 PM EDT -----  Inform pt of abnormal test result.  Urine culture is positive.  The Keflex prescribed does not look like it will work.  I prescribed Cipro 500 mg twice daily x 10 days.  The Cipro may make his tacrolimus levels higher.  He might wish to discuss this with the doctors to manage this medication.  Pt is aware and will call his doctor to confirm .

## 2024-05-23 NOTE — TELEPHONE ENCOUNTER
Patient called the RX Refill Line. Message is being forwarded to the office.     Patient is requesting : pt prescribed cipro by urology for cystitis was told it can increase the levels of prograf . He is asking Dr Junior if this medication is ok to take.     Please contact patient at  [phone]

## 2024-05-23 NOTE — TELEPHONE ENCOUNTER
Problem: Discharge Planning  Goal: Discharge to home or other facility with appropriate resources  Outcome: Not Progressing     Problem: Skin/Tissue Integrity  Goal: Absence of new skin breakdown  Description: 1.  Monitor for areas of redness and/or skin breakdown  2.  Assess vascular access sites hourly  3.  Every 4-6 hours minimum:  Change oxygen saturation probe site  4.  Every 4-6 hours:  If on nasal continuous positive airway pressure, respiratory therapy assess nares and determine need for appliance change or resting period.  Outcome: Not Progressing     Problem: Confusion  Goal: Confusion, delirium, dementia, or psychosis is improved or at baseline  Description: INTERVENTIONS:  1. Assess for possible contributors to thought disturbance, including medications, impaired vision or hearing, underlying metabolic abnormalities, dehydration, psychiatric diagnoses, and notify attending LIP  2. [place]le high risk fall precautions, as indicated  3. Provide frequent short contacts to provide reality reorientation, refocusing and direction  4. Decrease environmental stimuli, including noise as appropriate  5. Monitor and intervene to maintain adequate nutrition, hydration, elimination, sleep and activity  6. If unable to ensure safety without constant attention obtain sitter and review sitter guidelines with assigned personnel  7. Initiate Psychosocial CNS and Spiritual Care consult, as indicated  Outcome: Not Progressing     Problem: Chronic Conditions and Co-morbidities  Goal: Patient's chronic conditions and co-morbidity symptoms are monitored and maintained or improved  Outcome: Not Progressing     Problem: Safety - Adult  Goal: Free from fall injury  3/11/2024 1424 by Vita Mora RN  Outcome: Progressing     Problem: Pain  Goal: Verbalizes/displays adequate comfort level or baseline comfort level  Outcome: Progressing     Problem: Safety - Medical Restraint  Goal: Remains free of injury from restraints  Patient called stating he just missed the call from the office.  Warm transfer to nurse in office

## 2024-06-20 PROBLEM — N30.00 ACUTE CYSTITIS WITHOUT HEMATURIA: Status: RESOLVED | Noted: 2024-05-21 | Resolved: 2024-06-20

## 2024-08-15 ENCOUNTER — APPOINTMENT (OUTPATIENT)
Dept: LAB | Facility: HOSPITAL | Age: 69
End: 2024-08-15
Payer: MEDICARE

## 2024-08-15 DIAGNOSIS — N13.8 BENIGN PROSTATIC HYPERPLASIA WITH URINARY OBSTRUCTION: ICD-10-CM

## 2024-08-15 DIAGNOSIS — N30.00 ACUTE CYSTITIS WITHOUT HEMATURIA: ICD-10-CM

## 2024-08-15 DIAGNOSIS — N40.1 BENIGN PROSTATIC HYPERPLASIA WITH URINARY OBSTRUCTION: ICD-10-CM

## 2024-08-15 DIAGNOSIS — R97.20 ELEVATED PSA: ICD-10-CM

## 2024-08-15 LAB
BACTERIA UR QL AUTO: ABNORMAL /HPF
BILIRUB UR QL STRIP: NEGATIVE
CLARITY UR: CLEAR
COLOR UR: ABNORMAL
GLUCOSE UR STRIP-MCNC: NEGATIVE MG/DL
HGB UR QL STRIP.AUTO: 10
HYALINE CASTS #/AREA URNS LPF: ABNORMAL /LPF
KETONES UR STRIP-MCNC: NEGATIVE MG/DL
LEUKOCYTE ESTERASE UR QL STRIP: NEGATIVE
NITRITE UR QL STRIP: NEGATIVE
NON-SQ EPI CELLS URNS QL MICRO: ABNORMAL /HPF
PH UR STRIP.AUTO: 7 [PH]
PROT UR STRIP-MCNC: NEGATIVE MG/DL
PSA FREE MFR SERPL: 26.12 %
PSA FREE SERPL-MCNC: 7.81 NG/ML
PSA SERPL-MCNC: 29.9 NG/ML (ref 0–4)
RBC #/AREA URNS AUTO: ABNORMAL /HPF
SP GR UR STRIP.AUTO: 1.01 (ref 1–1.04)
UROBILINOGEN UA: NEGATIVE MG/DL
WBC #/AREA URNS AUTO: ABNORMAL /HPF

## 2024-08-15 PROCEDURE — 87086 URINE CULTURE/COLONY COUNT: CPT

## 2024-08-15 PROCEDURE — 84153 ASSAY OF PSA TOTAL: CPT

## 2024-08-15 PROCEDURE — 84154 ASSAY OF PSA FREE: CPT

## 2024-08-15 PROCEDURE — 36415 COLL VENOUS BLD VENIPUNCTURE: CPT

## 2024-08-15 PROCEDURE — 81001 URINALYSIS AUTO W/SCOPE: CPT

## 2024-08-16 LAB — BACTERIA UR CULT: NORMAL

## 2024-08-23 ENCOUNTER — OFFICE VISIT (OUTPATIENT)
Dept: UROLOGY | Facility: MEDICAL CENTER | Age: 69
End: 2024-08-23
Payer: MEDICARE

## 2024-08-23 VITALS
HEIGHT: 65 IN | DIASTOLIC BLOOD PRESSURE: 80 MMHG | OXYGEN SATURATION: 98 % | HEART RATE: 102 BPM | WEIGHT: 152 LBS | SYSTOLIC BLOOD PRESSURE: 110 MMHG | BODY MASS INDEX: 25.33 KG/M2

## 2024-08-23 DIAGNOSIS — N40.1 BENIGN PROSTATIC HYPERPLASIA WITH URINARY OBSTRUCTION: ICD-10-CM

## 2024-08-23 DIAGNOSIS — N13.8 BENIGN PROSTATIC HYPERPLASIA WITH URINARY OBSTRUCTION: ICD-10-CM

## 2024-08-23 DIAGNOSIS — D69.6 THROMBOCYTOPENIA (HCC): ICD-10-CM

## 2024-08-23 DIAGNOSIS — Z94.1 HEART TRANSPLANT, HETEROTOPIC, STATUS (HCC): ICD-10-CM

## 2024-08-23 DIAGNOSIS — R97.20 ELEVATED PSA: Primary | ICD-10-CM

## 2024-08-23 DIAGNOSIS — N52.03 COMBINED ARTERIAL INSUFFICIENCY AND CORPORO-VENOUS OCCLUSIVE ERECTILE DYSFUNCTION: ICD-10-CM

## 2024-08-23 PROCEDURE — 99214 OFFICE O/P EST MOD 30 MIN: CPT | Performed by: UROLOGY

## 2024-08-23 RX ORDER — TADALAFIL 5 MG/1
5 TABLET ORAL DAILY
Qty: 90 TABLET | Refills: 3 | Status: SHIPPED | OUTPATIENT
Start: 2024-08-23

## 2024-08-23 RX ORDER — TAMSULOSIN HYDROCHLORIDE 0.4 MG/1
0.4 CAPSULE ORAL
Qty: 90 CAPSULE | Refills: 3 | Status: SHIPPED | OUTPATIENT
Start: 2024-08-23

## 2024-08-23 NOTE — LETTER
2024     Toribio Thomas, DO  [address] [facility] [address]    Patient: Chel Rea   YOB: 1955   Date of Visit: 2024       Dear Dr. Thomas:    Thank you for referring Chel Rea to me for evaluation. Below are my notes for this consultation.    If you have questions, please do not hesitate to call me. I look forward to following your patient along with you.         Sincerely,        Reyes Early MD        CC: No Recipients    Reyes Early MD  2024 12:51 PM  Sign when Signing Visit  Ambulatory Visit  Name: Chel Rea      : 1955      MRN: [id number]  Encounter Provider: Reyes Early MD  Encounter Date: 2024   Encounter department: [facility]OLOGY [place]    Assessment & Plan  1. Elevated PSA  Assessment & Plan:  PSA remains elevated at 29.897.  On MRI his prostate is over 200 g and the PSA density is 11%.  The MRI last year was felt to represent a PI-RADS 2 prostate.  Options were discussed with the patient and I did recommend we obtain a 4K score.  Depending on the results of this we will consider proceeding with transperineal biopsies.  Orders:  -     MISCELLANEOUS LAB TEST; Future; Expected date: 2024  -     PSA, total and free; Future; Expected date: 2025  2. Benign prostatic hyperplasia with urinary obstruction  Assessment & Plan:  AUA symptom score is 7.  He is pleased with his voiding pattern.  He remains on tamsulosin.  He is also taking Cialis daily which she feels is helping as well.  We will continue present therapy.  Orders:  -     tamsulosin (FLOMAX) 0.4 mg; Take 1 capsule (0.4 mg total) by mouth daily with dinner  -     tadalafil (CIALIS) 5 MG tablet; Take 1 tablet (5 mg total) by mouth daily  3. Heart transplant, heterotopic, status (HCC)  4. Thrombocytopenia (HCC)  5. Combined arterial insufficiency and corporo-venous occlusive erectile dysfunction  Assessment & Plan:  He is doing well with Cialis  daily.  His prescription was refilled.  Orders:  -     tadalafil (CIALIS) 5 MG tablet; Take 1 tablet (5 mg total) by mouth daily      History of Present Illness    Chel Rea is a 68 y.o. male who presents for follow-up. He is voiding well now after treatment of his UTI. Stream is good and he feels he is emptying well. Nocturia x 2-3. He is happy with his voiding pattern.  No gross hematuria, dysuria, incontinence or symptoms of infection.  He is happy with his voiding pattern.  He underwent heart transplant in 2017 and is on immunosuppression.     Review of Systems   Constitutional:  Negative for chills, diaphoresis, fatigue and fever.   HENT: Negative.     Eyes: Negative.    Respiratory: Negative.     Cardiovascular: Negative.    Endocrine: Negative.    Genitourinary:         See HPI   Musculoskeletal: Negative.    Skin: Negative.    Allergic/Immunologic: Negative.    Neurological: Negative.    Hematological: Negative.    Psychiatric/Behavioral: Negative.         AUA SYMPTOM SCORE      Flowsheet Row Most Recent Value   AUA SYMPTOM SCORE    How often have you had a sensation of not emptying your bladder completely after you finished urinating? 1 (P)     How often have you had to urinate again less than two hours after you finished urinating? 2 (P)     How often have you found you stopped and started again several times when you urinate? 1 (P)     How often have you found it difficult to postpone urination? 0 (P)     How often have you had a weak urinary stream? 2 (P)     How often have you had to push or strain to begin urination? 0 (P)     How many times did you most typically get up to urinate from the time you went to bed at night until the time you got up in the morning? 1 (P)     Quality of Life: If you were to spend the rest of your life with your urinary condition just the way it is now, how would you feel about that? 2 (P)     AUA SYMPTOM SCORE 7 (P)            Objective    /80 (BP Location: Left  "arm, Patient Position: Sitting, Cuff Size: Standard)   Pulse 102   Ht 5' 5\" (1.651 m)   Wt 68.9 kg (152 lb)   SpO2 98%   BMI 25.29 kg/m²   Physical Exam  Vitals reviewed.   Constitutional:       General: He is not in acute distress.     Appearance: Normal appearance. He is well-developed and normal weight. He is not ill-appearing, toxic-appearing or diaphoretic.   HENT:      Head: Normocephalic and atraumatic.   Eyes:      General: No scleral icterus.     Conjunctiva/sclera: Conjunctivae normal.   Cardiovascular:      Rate and Rhythm: Normal rate.   Pulmonary:      Effort: Pulmonary effort is normal.   Abdominal:      General: Bowel sounds are normal. There is no distension.      Palpations: Abdomen is soft. There is no mass.      Tenderness: There is no abdominal tenderness. There is no right CVA tenderness, left CVA tenderness, guarding or rebound.      Hernia: No hernia is present.   Genitourinary:     Penis: Normal. No phimosis or hypospadias.       Testes: Normal.         Right: Mass not present.         Left: Mass not present.      Rectum: Normal.      Comments: Prostate 3-4 times enlarged and palpably benign.  Musculoskeletal:         General: Normal range of motion.      Cervical back: Normal range of motion and neck supple.   Skin:     General: Skin is warm and dry.   Neurological:      General: No focal deficit present.      Mental Status: He is alert and oriented to person, place, and time.   Psychiatric:         Mood and Affect: Mood normal.         Behavior: Behavior normal.         Thought Content: Thought content normal.         Judgment: Judgment normal.       Results  Lab Results   Component Value Date    PSA 29.897 (H) 08/15/2024    PSA 22.9 (H) 05/14/2024    PSA 23.87 (H) 08/21/2023     Lab Results   Component Value Date    CALCIUM 9.8 01/17/2024    K 4.7 01/17/2024    CO2 26 01/17/2024     01/17/2024    BUN 20 01/17/2024    CREATININE 1.23 01/17/2024     Lab Results   Component Value " Date    WBC 9.45 01/17/2024    HGB 14.8 01/17/2024    HCT 46.1 01/17/2024    MCV 85 01/17/2024     01/17/2024       Office Urine Dip  No results found for this or any previous visit (from the past 1 hour(s)).]    Administrative Statements

## 2024-08-23 NOTE — PATIENT INSTRUCTIONS
"  Patient Education     Benign prostatic hyperplasia (enlarged prostate)   The Basics   Written by the doctors and editors at [facility]   What is benign prostatic hyperplasia? -- \"Benign prostatic hyperplasia\" is the medical term for an enlarged prostate. The prostate is a gland that surrounds the urethra (the tube that carries urine from the bladder out through the penis) (figure 1). This gland often gets bigger as a person gets older.  Benign prostatic hyperplasia, also called \"BPH,\" is a common problem. It has nothing to do with prostate cancer. In fact, the word \"benign\" means \"not cancer.\"  What are the symptoms of BPH? -- Many people with BPH have no symptoms at all. When symptoms do occur, they can include:   Needing to urinate often, especially at night   Having trouble starting to urinate (this means that you might have to wait or strain before urine will come out)   Having a weak urine stream   Leaking or dribbling urine   Feeling as though your bladder is not empty even after you urinate  In rare cases, BPH can make it so you cannot urinate at all. This is a serious problem. If you cannot urinate at all, call your doctor right away.  Is there a test for BPH? -- Yes. Your doctor can check for BPH by doing a rectal exam. That means that they will put a finger into your anus to check how big your prostate is and what it feels like (figure 2). Your doctor might also do urine or blood tests to see if your symptoms might be caused by another problem, such as a bladder infection.  If you have symptoms like the ones listed above, see your doctor or nurse. They can figure out if BPH is really what's causing them. Those symptoms can also be caused by other problems, so it's important to have them checked out.  Is there anything I can do on my own to feel better? -- Yes. You might be able to improve your BPH symptoms if you:   Drink less fluids, especially just before bed or going out.   Drink less alcohol and " "caffeine. These drinks can make you urinate more often.   Avoid cold and allergy medicines that contain antihistamines or decongestants. These medicines can make the symptoms of BPH worse.   Do \"double voiding.\" That means that after you empty your bladder, you wait a moment, relax, and try to urinate again. It might also help to try to urinate at certain times, even if you don't feel that you need to.   Urinate when you first feel the urge.  How is BPH treated? -- If you do have BPH, your doctor can talk to you about treatment options. But you don't have to get treated if your symptoms do not bother you. Unless you lose the ability to urinate completely, leaving BPH untreated will not hurt you.  Treatments options include:   Watchful waiting - This means that you wait to see if your symptoms change, but you don't have treatment right away. If you choose watchful waiting, you can decide to try treatment later if your symptoms get worse or if your symptoms start to bother you more.   Medicines - There are 2 types of medicine commonly used to treat BPH. One type relaxes the muscles that surround the urethra. The other type keeps the prostate from growing more or even helps shrink the prostate. In some cases, doctors suggest taking both types of medicine at the same time. Depending on your symptoms, your doctor might also suggest other medicines.   Surgery - There are several ways to treat BPH with surgery. They can involve removing some of the prostate, shrinking the prostate, or making the urethra wider so that more urine can flow through. For most of these procedures, a doctor inserts special tools into the urethra.  How do I choose which treatment to have? -- The right treatment for you will depend on:   How much your symptoms bother you   How you feel about the different treatment options  If your symptoms don't bother you very much, you might not need any treatment. But if your symptoms do bother you, you " probably should get treated.  Doctors often suggest trying medicines first to see if they help. If medicines don't do enough, surgery is also an option. As you think about your choices, remember that treatments can have a downside. For example, medicines can cause side effects. And surgery has some general risks, and can also sometimes cause sexual problems and other side effects.  When you're thinking about which treatment to have, ask your doctor or nurse these questions:   How likely is it that this treatment will improve my symptoms?   What are the risks or side effects of this treatment?   What happens if I don't have this treatment?  When should I call the doctor? -- Call for advice if:   You have pain in your back, shoulder, or belly.   You have a fever of 100.4°F (38°C) or higher, chills, burning, or pain when you urinate.   You are not able to urinate or have more problems urinating.  All topics are updated as new evidence becomes available and our peer review process is complete.  This topic retrieved from [employer] on: Feb 26, 2024.  Topic 01633 Version 21.0  Release: 32.2.4 - C32.56  © 2024 UpToDate, Inc. and/or its affiliates. All rights reserved.  figure 1: Prostate gland     This drawing shows male internal organs and a close-up of the prostate gland.  Graphic 99015 Version 5.0  figure 2: Rectal exam     During a rectal exam, the doctor or nurse puts a finger inside your rectum and feels your prostate gland. That way, they can see how big it is and whether it has bumps or dents or anything unusual.  Graphic 34737 Version 6.0  Consumer Information Use and Disclaimer   Disclaimer: This generalized information is a limited summary of diagnosis, treatment, and/or medication information. It is not meant to be comprehensive and should be used as a tool to help the user understand and/or assess potential diagnostic and treatment options. It does NOT include all information about conditions, treatments,  medications, side effects, or risks that may apply to a specific patient. It is not intended to be medical advice or a substitute for the medical advice, diagnosis, or treatment of a health care provider based on the health care provider's examination and assessment of a patient's specific and unique circumstances. Patients must speak with a health care provider for complete information about their health, medical questions, and treatment options, including any risks or benefits regarding use of medications. This information does not endorse any treatments or medications as safe, effective, or approved for treating a specific patient. UpToDate, Inc. and its affiliates disclaim any warranty or liability relating to this information or the use thereof.The use of this information is governed by the Terms of Use, available at https://www.woltersVingleuwer.com/en/know/clinical-effectiveness-terms. 2024© UpToDate, Inc. and its affiliates and/or licensors. All rights reserved.  Copyright   © 2024 UpToDate, Inc. and/or its affiliates. All rights reserved.

## 2024-08-23 NOTE — PROGRESS NOTES
Ambulatory Visit  Name: Chel Rea      : 1955      MRN: [id number]  Encounter Provider: Reyes Early MD  Encounter Date: 2024   Encounter department: [facility] UROLOGY [place]    Assessment & Plan   1. Elevated PSA  Assessment & Plan:  PSA remains elevated at 29.897.  On MRI his prostate is over 200 g and the PSA density is 11%.  The MRI last year was felt to represent a PI-RADS 2 prostate.  Options were discussed with the patient and I did recommend we obtain a 4K score.  Depending on the results of this we will consider proceeding with transperineal biopsies.  Orders:  -     MISCELLANEOUS LAB TEST; Future; Expected date: 2024  -     PSA, total and free; Future; Expected date: 2025  2. Benign prostatic hyperplasia with urinary obstruction  Assessment & Plan:  AUA symptom score is 7.  He is pleased with his voiding pattern.  He remains on tamsulosin.  He is also taking Cialis daily which she feels is helping as well.  We will continue present therapy.  Orders:  -     tamsulosin (FLOMAX) 0.4 mg; Take 1 capsule (0.4 mg total) by mouth daily with dinner  -     tadalafil (CIALIS) 5 MG tablet; Take 1 tablet (5 mg total) by mouth daily  3. Heart transplant, heterotopic, status ([facility])  4. Thrombocytopenia ([facility])  5. Combined arterial insufficiency and corporo-venous occlusive erectile dysfunction  Assessment & Plan:  He is doing well with Cialis daily.  His prescription was refilled.  Orders:  -     tadalafil (CIALIS) 5 MG tablet; Take 1 tablet (5 mg total) by mouth daily      History of Present Illness     Chel Rea is a 68 y.o. male who presents for follow-up. He is voiding well now after treatment of his UTI. Stream is good and he feels he is emptying well. Nocturia x 2-3. He is happy with his voiding pattern.  No gross hematuria, dysuria, incontinence or symptoms of infection.  He is happy with his voiding pattern.  He underwent heart transplant in 2017 and is on  "immunosuppression.     Review of Systems   Constitutional:  Negative for chills, diaphoresis, fatigue and fever.   HENT: Negative.     Eyes: Negative.    Respiratory: Negative.     Cardiovascular: Negative.    Endocrine: Negative.    Genitourinary:         See HPI   Musculoskeletal: Negative.    Skin: Negative.    Allergic/Immunologic: Negative.    Neurological: Negative.    Hematological: Negative.    Psychiatric/Behavioral: Negative.         AUA SYMPTOM SCORE      Flowsheet Row Most Recent Value   AUA SYMPTOM SCORE    How often have you had a sensation of not emptying your bladder completely after you finished urinating? 1 (P)     How often have you had to urinate again less than two hours after you finished urinating? 2 (P)     How often have you found you stopped and started again several times when you urinate? 1 (P)     How often have you found it difficult to postpone urination? 0 (P)     How often have you had a weak urinary stream? 2 (P)     How often have you had to push or strain to begin urination? 0 (P)     How many times did you most typically get up to urinate from the time you went to bed at night until the time you got up in the morning? 1 (P)     Quality of Life: If you were to spend the rest of your life with your urinary condition just the way it is now, how would you feel about that? 2 (P)     AUA SYMPTOM SCORE 7 (P)            Objective     /80 (BP Location: Left arm, Patient Position: Sitting, Cuff Size: Standard)   Pulse 102   Ht 5' 5\" (1.651 m)   Wt 68.9 kg (152 lb)   SpO2 98%   BMI 25.29 kg/m²   Physical Exam  Vitals reviewed.   Constitutional:       General: He is not in acute distress.     Appearance: Normal appearance. He is well-developed and normal weight. He is not ill-appearing, toxic-appearing or diaphoretic.   HENT:      Head: Normocephalic and atraumatic.   Eyes:      General: No scleral icterus.     Conjunctiva/sclera: Conjunctivae normal.   Cardiovascular:      Rate " and Rhythm: Normal rate.   Pulmonary:      Effort: Pulmonary effort is normal.   Abdominal:      General: Bowel sounds are normal. There is no distension.      Palpations: Abdomen is soft. There is no mass.      Tenderness: There is no abdominal tenderness. There is no right CVA tenderness, left CVA tenderness, guarding or rebound.      Hernia: No hernia is present.   Genitourinary:     Penis: Normal. No phimosis or hypospadias.       Testes: Normal.         Right: Mass not present.         Left: Mass not present.      Rectum: Normal.      Comments: Prostate 3-4 times enlarged and palpably benign.  Musculoskeletal:         General: Normal range of motion.      Cervical back: Normal range of motion and neck supple.   Skin:     General: Skin is warm and dry.   Neurological:      General: No focal deficit present.      Mental Status: He is alert and oriented to person, place, and time.   Psychiatric:         Mood and Affect: Mood normal.         Behavior: Behavior normal.         Thought Content: Thought content normal.         Judgment: Judgment normal.       Results  Lab Results   Component Value Date    PSA 29.897 (H) 08/15/2024    PSA 22.9 (H) 05/14/2024    PSA 23.87 (H) 08/21/2023     Lab Results   Component Value Date    CALCIUM 9.8 01/17/2024    K 4.7 01/17/2024    CO2 26 01/17/2024     01/17/2024    BUN 20 01/17/2024    CREATININE 1.23 01/17/2024     Lab Results   Component Value Date    WBC 9.45 01/17/2024    HGB 14.8 01/17/2024    HCT 46.1 01/17/2024    MCV 85 01/17/2024     01/17/2024       Office Urine Dip  No results found for this or any previous visit (from the past 1 hour(s)).]    Administrative Statements

## 2024-08-23 NOTE — ASSESSMENT & PLAN NOTE
PSA remains elevated at 29.897.  On MRI his prostate is over 200 g and the PSA density is 11%.  The MRI last year was felt to represent a PI-RADS 2 prostate.  Options were discussed with the patient and I did recommend we obtain a 4K score.  Depending on the results of this we will consider proceeding with transperineal biopsies.

## 2024-08-23 NOTE — ASSESSMENT & PLAN NOTE
AUA symptom score is 7.  He is pleased with his voiding pattern.  He remains on tamsulosin.  He is also taking Cialis daily which she feels is helping as well.  We will continue present therapy.

## 2024-09-09 ENCOUNTER — TELEPHONE (OUTPATIENT)
Age: 69
End: 2024-09-09

## 2024-09-09 NOTE — TELEPHONE ENCOUNTER
Meeta from [facility] called asking what tests were ordered for the patient at his office appointment with Dr. Early. I advised the 4k score blood work be completed ASAP and the PSA can be completed 2 weeks prior to his follow up appointment 2/28/2025. Meeta verbalized understanding.

## 2024-09-12 ENCOUNTER — LAB (OUTPATIENT)
Dept: LAB | Facility: HOSPITAL | Age: 69
End: 2024-09-12

## 2024-09-12 DIAGNOSIS — R97.20 ELEVATED PSA: ICD-10-CM

## 2024-09-13 ENCOUNTER — APPOINTMENT (OUTPATIENT)
Dept: LAB | Age: 69
End: 2024-09-13
Payer: MEDICARE

## 2024-09-13 LAB — TACROLIMUS BLD-MCNC: 29.3 NG/ML (ref 3–15)

## 2024-09-18 NOTE — RESULT ENCOUNTER NOTE
I think this is a 4K score.  I do not see the results anywhere in epic.  Can we check on this?  Thank you

## 2024-09-24 ENCOUNTER — TELEPHONE (OUTPATIENT)
Age: 69
End: 2024-09-24

## 2024-09-24 NOTE — TELEPHONE ENCOUNTER
Call placed to 4K testing lab. Was on hold for >5 minutes and unable to speak to representative. No one answered.     Will try to call back at a later time to check on status of 4K testing results.

## 2024-09-24 NOTE — TELEPHONE ENCOUNTER
Call placed to 4K testing lab and spoke with customer service. They stated that DX code was missing from form and this needs to be added to form for lab to process request.   Order form was updated and faxed to lab as requested.     Call placed to pt and spoke with him, informed him of the updated information and once labs are sent to the MD to review, the office will contact him with FU and recommendations. Pt is aware and will await a call once results are final.

## 2024-09-24 NOTE — TELEPHONE ENCOUNTER
Patient calling for results of [facility] 4K score.  Reviewed chart explained on 9/19 we scanned preliminary results in, however still no final results. Patient states the  Told him it would be here in a week, and he had it done 9/13. Inquiring what the time frame is, he is very eager to get results. Please advise.

## 2024-09-26 ENCOUNTER — PREP FOR PROCEDURE (OUTPATIENT)
Dept: UROLOGY | Facility: MEDICAL CENTER | Age: 69
End: 2024-09-26

## 2024-09-26 ENCOUNTER — TELEPHONE (OUTPATIENT)
Dept: UROLOGY | Facility: MEDICAL CENTER | Age: 69
End: 2024-09-26

## 2024-09-26 DIAGNOSIS — R97.20 ELEVATED PSA: Primary | ICD-10-CM

## 2024-09-26 NOTE — TELEPHONE ENCOUNTER
4K testing score completed and scanned under Media.     Dr. Early is on PTO.     Will forward to MD to review.

## 2024-09-26 NOTE — TELEPHONE ENCOUNTER
I discussed on phone with patient his recent 4K score, it is 95.0 which is at the high range of probability for prostate cancer    As discussed in recent office notes, patient agreeable to transperineal prostate biopsy to be performed.  Will not need fusion.

## 2024-10-01 ENCOUNTER — TELEPHONE (OUTPATIENT)
Dept: UROLOGY | Facility: MEDICAL CENTER | Age: 69
End: 2024-10-01

## 2024-10-01 NOTE — TELEPHONE ENCOUNTER
----- Message from Kenton Price MD sent at 9/26/2024  2:15 PM EDT -----  See note, patient agrees to transperineal prostate biopsy.  Not fusion.  Kirsty, please contact patient

## 2024-10-01 NOTE — TELEPHONE ENCOUNTER
Spoke with patient and confirmed surgery date of: 12/11/2024  Type of surgery: TransP BX  Operating physician: Dr. Price  Location of surgery: [facility]    Verbally went over prep with patient on: 10/1/2024  NPO  Bowel prep? Yes, 1 enema 1 hour prior to leaving the house for the procedure  Hospital calls afternoon prior with arrival time -Calls Friday afternoon for Monday surgeries  Patient needs ride to and from surgery (outpatient/inpatient)   Pre-op testing to be done 2 weeks prior to surgery   CBC, CMP, Urine C&S, EKG  Blood thinners:   none  Clearances needed: none    Mailed packet on: 10/1/2024  Copy of packet scanned into Media on: 10/1/2024  Labs in packet  Soap instructions in packet  Pre-op & Post-op in packet  H&P on admit  PO       Consent: on admit

## 2024-10-01 NOTE — TELEPHONE ENCOUNTER
Clinical, patient requires a 2 week pathology visit with Dr. Price. None available, please contact the patient to arrange

## 2024-10-21 ENCOUNTER — TELEPHONE (OUTPATIENT)
Age: 69
End: 2024-10-21

## 2024-10-21 NOTE — TELEPHONE ENCOUNTER
Does the patient want to see a Gastroenterologist prior to their procedure OR are they having any GI symptoms? no    Has the patient been hospitalized or had abdominal surgery in the past 6 months? yes    Does the patient use supplemental oxygen? no    Does the patient take Coumadin, Lovenox, Plavix, Elliquis, Xarelto, or other blood thinning medication? no    Has the patient had a stroke, cardiac event, or stent placed in the past year? no    Pt had hernia surgery

## 2024-10-21 NOTE — TELEPHONE ENCOUNTER
[facility] calling to schedule a colonoscopy for patient as he has a history of polyps. Asked for office to reach out to patient to review OA screening.   Left message for patient to contact office.

## 2024-11-20 ENCOUNTER — OFFICE VISIT (OUTPATIENT)
Dept: DERMATOLOGY | Facility: CLINIC | Age: 69
End: 2024-11-20
Payer: MEDICARE

## 2024-11-20 VITALS — WEIGHT: 151 LBS | BODY MASS INDEX: 25.13 KG/M2 | TEMPERATURE: 97.8 F

## 2024-11-20 DIAGNOSIS — D48.9 NEOPLASM OF UNCERTAIN BEHAVIOR: ICD-10-CM

## 2024-11-20 DIAGNOSIS — D22.71 MULTIPLE BENIGN MELANOCYTIC NEVI OF UPPER AND LOWER EXTREMITIES AND TRUNK: ICD-10-CM

## 2024-11-20 DIAGNOSIS — L57.8 OTHER SKIN CHANGES DUE TO CHRONIC EXPOSURE TO NONIONIZING RADIATION: ICD-10-CM

## 2024-11-20 DIAGNOSIS — L57.0 ACTINIC KERATOSIS: ICD-10-CM

## 2024-11-20 DIAGNOSIS — D22.72 MULTIPLE BENIGN MELANOCYTIC NEVI OF UPPER AND LOWER EXTREMITIES AND TRUNK: ICD-10-CM

## 2024-11-20 DIAGNOSIS — D22.62 MULTIPLE BENIGN MELANOCYTIC NEVI OF UPPER AND LOWER EXTREMITIES AND TRUNK: ICD-10-CM

## 2024-11-20 DIAGNOSIS — L81.4 LENTIGINES: ICD-10-CM

## 2024-11-20 DIAGNOSIS — L82.1 SEBORRHEIC KERATOSIS: ICD-10-CM

## 2024-11-20 DIAGNOSIS — Z12.83 SKIN EXAM, SCREENING FOR CANCER: Primary | ICD-10-CM

## 2024-11-20 DIAGNOSIS — L20.89 OTHER ATOPIC DERMATITIS: Primary | ICD-10-CM

## 2024-11-20 DIAGNOSIS — Z85.820 HISTORY OF MELANOMA: ICD-10-CM

## 2024-11-20 DIAGNOSIS — D22.61 MULTIPLE BENIGN MELANOCYTIC NEVI OF UPPER AND LOWER EXTREMITIES AND TRUNK: ICD-10-CM

## 2024-11-20 DIAGNOSIS — D22.5 MULTIPLE BENIGN MELANOCYTIC NEVI OF UPPER AND LOWER EXTREMITIES AND TRUNK: ICD-10-CM

## 2024-11-20 DIAGNOSIS — B07.9 VERRUCA VULGARIS: ICD-10-CM

## 2024-11-20 DIAGNOSIS — D18.01 CHERRY ANGIOMA: ICD-10-CM

## 2024-11-20 PROCEDURE — 88342 IMHCHEM/IMCYTCHM 1ST ANTB: CPT | Performed by: STUDENT IN AN ORGANIZED HEALTH CARE EDUCATION/TRAINING PROGRAM

## 2024-11-20 PROCEDURE — 99214 OFFICE O/P EST MOD 30 MIN: CPT | Performed by: DERMATOLOGY

## 2024-11-20 PROCEDURE — 88305 TISSUE EXAM BY PATHOLOGIST: CPT | Performed by: STUDENT IN AN ORGANIZED HEALTH CARE EDUCATION/TRAINING PROGRAM

## 2024-11-20 PROCEDURE — 11102 TANGNTL BX SKIN SINGLE LES: CPT | Performed by: DERMATOLOGY

## 2024-11-20 PROCEDURE — 17003 DESTRUCT PREMALG LES 2-14: CPT | Performed by: DERMATOLOGY

## 2024-11-20 PROCEDURE — 17000 DESTRUCT PREMALG LESION: CPT | Performed by: DERMATOLOGY

## 2024-11-20 RX ORDER — FLUOCINONIDE 0.5 MG/G
CREAM TOPICAL
Qty: 60 G | Refills: 2 | Status: SHIPPED | OUTPATIENT
Start: 2024-11-20

## 2024-11-20 NOTE — PROGRESS NOTES
"[facility] Dermatology Clinic Note     Patient Name: Chel Rea  Encounter Date: 11/20/2024     Have you been cared for by a [facility] Dermatologist in the last 3 years and, if so, which description applies to you?    Yes.  I have been here within the last 3 years, and my medical history has NOT changed since that time.  I am MALE/not capable of bearing children.    REVIEW OF SYSTEMS:  Have you recently had or currently have any of the following? No changes in my recent health.   PAST MEDICAL HISTORY:  Have you personally ever had or currently have any of the following?  If \"YES,\" then please provide more detail. No changes in my medical history.   HISTORY OF IMMUNOSUPPRESSION: Do you have a history of any of the following:  Systemic Immunosuppression such as Diabetes, Biologic or Immunotherapy, Chemotherapy, Organ Transplantation, Bone Marrow Transplantation or Prednisone?  YES, heart transplant takes tacrolimus      Answering \"YES\" requires the addition of the dotphrase \"IMMUNOSUPPRESSED\" as the first diagnosis of the patient's visit.   FAMILY HISTORY:  Any \"first degree relatives\" (parent, brother, sister, or child) with the following?    No changes in my family's known health.   PATIENT EXPERIENCE:    Do you want the Dermatologist to perform a COMPLETE skin exam today including a clinical examination under the \"bra and underwear\" areas?  Yes  If necessary, do we have your permission to call and leave a detailed message on your Preferred Phone number that includes your specific medical information?  Yes      Allergies   Allergen Reactions    Oxycodone-Acetaminophen Hives     Other reaction(s): rash    Codeine Hives     URTICARIA   Other reaction(s): rash and hives      Current Outpatient Medications:     amLODIPine (NORVASC) 10 mg tablet, Take 1 tablet (10 mg total) by mouth daily, Disp: 90 tablet, Rfl: 3    aspirin (ECOTRIN LOW STRENGTH) 81 mg EC tablet, Take 81 mg by mouth daily, Disp: , Rfl:     famotidine " (PEPCID) 20 mg tablet, Take 20 mg by mouth 2 (two) times a day, Disp: , Rfl:     loratadine (CLARITIN) 10 mg tablet, Take 10 mg by mouth daily, Disp: , Rfl:     losartan (COZAAR) 100 MG tablet, Take 100 mg by mouth daily, Disp: , Rfl:     mirtazapine (REMERON) 15 mg tablet, Take 15 mg by mouth daily as needed (as needed), Disp: , Rfl:     mycophenolate (CELLCEPT) 250 mg capsule, TAKE 2 CAPSULES (500 MG) BY MOUTH TWO (2) TIMES A  DAY, Disp: , Rfl:     pravastatin (PRAVACHOL) 40 mg tablet, TAKE 1 TABLET (40 MG) BY MOUTH DAILY, Disp: , Rfl:     tacrolimus (PROGRAF) 0.5 mg capsule, Take 0.5 mg by mouth daily at bedtime, Disp: , Rfl:     tacrolimus (PROGRAF) 1 mg capsule, Take 2 mg by mouth 2 (two) times a day, Disp: , Rfl:     tadalafil (CIALIS) 5 MG tablet, Take 1 tablet (5 mg total) by mouth daily, Disp: 90 tablet, Rfl: 3    tamsulosin (FLOMAX) 0.4 mg, Take 1 capsule (0.4 mg total) by mouth daily with dinner, Disp: 90 capsule, Rfl: 3          Whom besides the patient is providing clinical information about today's encounter?   NO ADDITIONAL HISTORIAN (patient alone provided history)    Physical Exam and Assessment/Plan by Diagnosis:    DERMATITIS    Physical Exam:  Anatomic Location Affected:  trunk   Morphological Description:  erythematous plaques; excoriations     Additional History of Present Condition:  has responded to lidex cream in the past- patient ran out of meds    Assessment and Plan:  Based on a thorough discussion of this condition and the management approach to it (including a comprehensive discussion of the known risks, side effects and potential benefits of treatment), the patient (family) agrees to implement the following specific plan:    Reviewed gentle skin care in detail (no hot showers, limited soap usage to armpits, private area and feet, no washcloth, gentle pat dry with towel following shower, moisturizing with creams, ointments- not lotions). I recommend the [place] BEMRAN+  "Moisturizing Cream   Start lidex cream BID up to 2 weeks per months then as needed issa flares          HISTORY OF MELANOMA IN SITU     Physical Exam:  Anatomic Location Affected:  right flank  Morphological Description of Scar:  well healed   Year Treated: December 2023  Suspected Recurrence: no    Additional History of Present Condition:  Treated via excision on 12/01/2023    Assessment and Plan:  Based on a thorough discussion of this condition and the management approach to it (including a comprehensive discussion of the known risks, side effects and potential benefits of treatment), the patient (family) agrees to implement the following specific plan:  Continue to monitor.   Apply sunscreen SPF 30+, reapplying every two hours. Wear sun protective clothing, long sleeve shirts, wide brimmed hats, and sunglasses.   Follow up in 6 months.     What happens at follow-up?  The main purpose of follow-up is to detect recurrences early (metastatic melanoma), but it also offers an opportunity to diagnose a new primary melanoma at the first possible opportunity. A second invasive melanoma occurs in 5-10% of melanoma patients and a new melanoma in situ is diagnosed in more than 20% of melanoma patients.    Our practice makes the following recommendations for follow-up for patients with invasive melanoma.  At-least \"monthly\" self-skin examinations   Routine skin checks by a board certified dermatologist  Follow-up intervals are \"every 3 months\" within 2 years of a new melanoma diagnosis; \"every 6 months\" between 2-4 years of a new melanoma diagnosis; and \"annually\" after 4 years of a new melanoma diagnosis  Individual patient's needs should be considered before an appropriate follow-up is offered  Provide education and support to help the patient adjust to their illness    Follow-up appointments should include:  A check of the scar where the primary melanoma was removed  Checking the regional lymph nodes  A general skin " examination  A full physical examination at least annually by your primary care physician    In those with more advanced primary disease, follow-up may include:  Blood tests  Imaging: ultrasound, X-ray, CT, MRI and PET scan.    Most tests are not worthwhile for patients with stage 1 or 2 melanoma unless there are signs or symptoms of disease recurrence or metastasis. No tests are necessary for healthy patients who have remained well for five years or longer after removal of their melanoma.    What is the outlook for patients with melanoma?  Melanoma in situ is cured by excision because it has no potential to spread around the body.  The risk of spread and ultimate death from invasive melanoma depends on several factors, but the main one is the Breslow thickness of the melanoma at the time it was surgically removed.  Metastases are rare for melanomas < 0.75 mm and the risk for tumours 0.75-1 mm thick is about 5%. The risk steadily increases with thickness so that melanomas > 4 mm have a risk of metastasis of about 40%.    Melanoma is a potentially serious type of skin cancer, in which there is uncontrolled growth of melanocytes (pigment cells). Melanoma is sometimes called malignant melanoma.  Normal melanocytes are found in the basal layer of the epidermis (the outer layer of skin). Melanocytes produce a protein called melanin, which protects skin cells by absorbing ultraviolet (UV) radiation. Melanocytes are found in equal numbers in black and white skin, but melanocytes in black skin produce much more melanin. People with dark brown or black skin are very much less likely to be damaged by UV radiation than those with white skin.     CHERRY ANGIOMAS     Physical Exam:  Anatomic Location Affected:  Trunk and extremities  Morphological Description:  Scattered cherry red papules  Denies pain, itch, bleeding. No treatments tried. Present for years. Present constantly; no modifying factors which make it worse or  "better.     Assessment and Plan:  Based on a thorough discussion of this condition and the management approach to it (including a comprehensive discussion of the known risks, side effects and potential benefits of treatment), the patient (family) agrees to implement the following specific plan:  Reassure benign        SEBORRHEIC KERATOSIS; NON-INFLAMED     Physical Exam:  Anatomic Location Affected:  Trunk and extremities  Morphological Description:  Waxy, smooth to warty textured, yellow to brownish-grey to dark brown to blackish, discrete, \"stuck-on\" appearing papules.  Present for years. Denies pain, itch, bleeding.      Additional History of Present Condition:  Present constantly; no modifying factors which make it worse or better. No prior treatment.       Assessment and Plan:  Based on a thorough discussion of this condition and the management approach to it (including a comprehensive discussion of the known risks, side effects and potential benefits of treatment), the patient (family) agrees to implement the following specific plan:  Reassure benign  Use sun protection.  Apply SPF 30 or higher at least three times a day.  Wear sun protecting clothing and hats.        SOLAR LENTIGINES   OTHER SKIN CHANGES DUE TO CHRONIC EXPOSURE TO NONIONIZING RADIATION     Physical Exam:  Anatomic Location Affected:  Sun exposed areas of back, chest, arms, legs  Morphological Description:  Multiple scattered brown to tan evenly pigmented macules   Denies pain, itch, bleeding. No treatments tried. Present for months - years. Reports getting newer lesions with sun exposure.         Assessment and Plan:  Based on a thorough discussion of this condition and the management approach to it (including a comprehensive discussion of the known risks, side effects and potential benefits of treatment), the patient (family) agrees to implement the following specific plan:  Reassure benign  Use sun protection.  Apply SPF 30 or higher at " "least three times a day.  Wear sun protecting clothing and hats.         MULTIPLE MELANOCYTIC NEVI (\"Moles\")     Physical Exam:  Anatomic Location Affected: Trunk and extremities  Morphological Description:  Scattered, round to ovoid, symmetrical-appearing, even bordered, skin colored to dark brown macules/papules  Denies pain, itch, bleeding. No treatments tried. Present for years. Present constantly; no modifying factors which make it worse or better. Denies actively changing or growing moles.      Assessment and Plan:  Based on a thorough discussion of this condition and the management approach to it (including a comprehensive discussion of the known risks, side effects and potential benefits of treatment), the patient (family) agrees to implement the following specific plan:  Reassure benign  Monitor for changes  Use sun protection.  Apply SPF 30 or higher at least three times a day.  Wear sun protecting clothing and hats.       Worrisome signs of skin malignancy discussed, questions answered. Regular self-skin check discussed. Advised to call or return to office if patient notices any spots of concern, rapidly growing/changing lesions, bleeding lesions, non-healing lesions. Advised regular SPF use.      NEOPLASM OF UNCERTAIN BEHAVIOR OF SKIN    Physical Exam:  (Anatomic Location); (Size and Morphological Description); (Differential Diagnosis):  Left upper arm; 0.7 cm x 0.7 cm scaly thick papule; ddx: wart vs scc    Additional History of Present Condition:  Patient states spot has been there for some time.     Assessment and Plan:  I have discussed with the patient that a sample of skin via a \"skin biopsy” would be potentially helpful to further make a specific diagnosis under the microscope.  Based on a thorough discussion of this condition and the management approach to it (including a comprehensive discussion of the known risks, side effects and potential benefits of treatment), the patient (family) agrees to " implement the following specific plan:    Procedure:  Skin Biopsy.  After a thorough discussion of treatment options and risk/benefits/alternatives (including but not limited to local pain, scarring, dyspigmentation, blistering, possible superinfection, and inability to confirm a diagnosis via histopathology), verbal and written consent were obtained and portion of the rash was biopsied for tissue sample.  See below for consent that was obtained from patient and subsequent Procedure Note.  PROCEDURE TANGENTIAL (SHAVE) BIOPSY NOTE:    Performing Physician:   Anatomic Location; Clinical Description with size (cm); Pre-Op Diagnosis:   Left upper arm; 0.7 cm x 0.7 cm scaly thick papule; ddx: wart vs scc  Post-op diagnosis: Same     Local anesthesia: 1% xylocaine with epi      Topical anesthesia: None    Hemostasis: Aluminum chloride       After obtaining informed consent  at which time there was a discussion about the purpose of biopsy  and low risks of infection and bleeding.  The area was prepped and draped in the usual fashion. Anesthesia was obtained with 1% lidocaine with epinephrine. A shave biopsy to an appropriate sampling depth was obtained by Shave (Dermablade or 15 blade) The resulting wound was covered with surgical ointment and bandaged appropriately.     The patient tolerated the procedure well without complications and was without signs of functional compromise.      Specimen has been sent for review by Dermatopathology.    Standard post-procedure care has been explained and has been included in written form within the patient's copy of Informed Consent.    INFORMED CONSENT DISCUSSION AND POST-OPERATIVE INSTRUCTIONS FOR PATIENT    I.  RATIONALE FOR PROCEDURE  I understand that a skin biopsy allows the Dermatologist to test a lesion or rash under the microscope to obtain a diagnosis.  It usually involves numbing the area with numbing medication and removing a small piece of skin; sometimes the area  "will be closed with sutures. In this specific procedure, sutures are not usually needed.  If any sutures are placed, then they are usually need to be removed in 2 weeks or less.    I understand that my Dermatologist recommends that a skin \"shave\" biopsy be performed today.  A local anesthetic, similar to the kind that a dentist uses when filling a cavity, will be injected with a very small needle into the skin area to be sampled.  The injected skin and tissue underneath \"will go to sleep” and become numb so no pain should be felt afterwards.  An instrument shaped like a tiny \"razor blade\" (shave biopsy instrument) will be used to cut a small piece of tissue and skin from the area so that a sample of tissue can be taken and examined more closely under the microscope.  A slight amount of bleeding will occur, but it will be stopped with direct pressure and a pressure bandage and any other appropriate methods.  I understands that a scar will form where the wound was created.  Surgical ointment will be applied to help protect the wound.  Sutures are not usually needed.    II.  RISKS AND POTENTIAL COMPLICATIONS   I understand the risks and potential complications of a skin biopsy include but are not limited to the following:  Bleeding  Infection  Pain  Scar/keloid  Skin discoloration  Incomplete Removal  Recurrence  Nerve Damage/Numbness/Loss of Function  Allergic Reaction to Anesthesia  Biopsies are diagnostic procedures and based on findings additional treatment or evaluation may be required  Loss or destruction of specimen resulting in no additional findings    My Dermatologist has explained to me the nature of the condition, the nature of the procedure, and the benefits to be reasonably expected compared with alternative approaches.  My Dermatologist has discussed the likelihood of major risks or complications of this procedure including the specific risks listed above, such as bleeding, infection, and " "scarring/keloid.  I understand that a scar is expected after this procedure.  I understand that my physician cannot predict if the scar will form a \"keloid,\" which extends beyond the borders of the wound that is created.  A keloid is a thick, painful, and bumpy scar.  A keloid can be difficult to treat, as it does not always respond well to therapy, which includes injecting cortisone directly into the keloid every few weeks.  While this usually reduces the pain and size of the scar, it does not eliminate it.      I understand that photographs may be taken before and after the procedure.  These will be maintained as part of the medical providers confidential records and may not be made available to me.  I further authorize the medical provider to use the photographs for teaching purposes or to illustrate scientific papers, books, or lectures if in his/her judgment, medical research, education, or science may benefit from its use.    I have had an opportunity to fully inquire about the risks and benefits of this procedure and its alternatives.   I have been given ample time and opportunity to ask questions and to seek a second opinion if I wished to do so.  I acknowledge that there have specifically been no guarantees as to the cosmetic results from the procedure.  I am aware that with any procedure there is always the possibility of an unexpected complication.    III. POST-PROCEDURAL CARE (WHAT YOU WILL NEED TO DO \"AFTER THE BIOPSY\" TO OPTIMIZE HEALING)    Keep the area clean and dry.  Try NOT to remove the bandage or get it wet for the first 24 hours.    Gently clean the area and apply surgical ointment (such as Vaseline petrolatum ointment, which is available \"over the counter\" and not a prescription) to the biopsy site for up to 2 weeks straight.  This acts to protect the wound from the outside world.      Sutures are not usually placed in this procedure.  If any sutures were placed, return for suture removal as " instructed (generally 1 week for the face, 2 weeks for the body).      Take Acetaminophen (Tylenol) for discomfort, if no contraindications.  Ibuprofen or aspirin could make bleeding worse.    Call our office immediately for signs of infection: fever, chills, increased redness, warmth, tenderness, discomfort/pain, or pus or foul smell coming from the wound.    WHAT TO DO IF THERE IS ANY BLEEDING?  If a small amount of bleeding is noticed, place a clean cloth over the area and apply firm pressure for ten minutes.  Check the wound after 10 minutes of direct pressure.  If bleeding persists, try one more time for an additional 10 minutes of direct pressure on the area.  If the bleeding becomes heavier or does not stop after the second attempt, or if you have any other questions about this procedure, then please call your [facility]'s Dermatologist by calling [phone] (SKIN).     I hereby acknowledge that I have reviewed and verified the site with my Dermatologist and have requested and authorized my Dermatologist to proceed with the procedure.    ACTINIC KERATOSES  - Relevant exam: On the left brow, left temple, and right temple are scaly pink macules without palpable dermal component    - Exam and clinical history consistent with actinic keratoses  - Discussed that these lesions are considered premalignant with the potential to evolve into squamous cell carcinoma.   - Discussed that these are due to chronic sun exposure and therefore recommend use of sunscreen/sun protection to prevent further sun damage  - Discussed treatment options including risks, benefits  - Patient counseled to return to the office in 4-6 weeks after completion of treatment for recheck if not resolved at which time retreatment or biopsy to rule out SCC will be determined based on clinic findings    PROCEDURE:  DESTRUCTION OF PRE-MALIGNANT LESIONS    - After a thorough discussion of treatment options and risk/benefits/alternatives (including but  not limited to local pain, scarring, dyspigmentation, blistering, and possible superinfection), verbal and written consent were obtained and the aforementioned lesions were treated on with cryotherapy using liquid nitrogen x 1 cycle for 5-10 seconds.    TOTAL NUMBER of 3 pre-malignant lesions were treated today on the ANATOMIC LOCATION: left brow, left temple, and right temple.     The patient tolerated the procedure well, and after-care instructions were provided.      Scribe Attestation      I,:  Marlyn Pryor MA am acting as a scribe while in the presence of the attending physician.:       I,:  Altaf Hugo MD personally performed the services described in this documentation    as scribed in my presence.:

## 2024-11-25 ENCOUNTER — APPOINTMENT (OUTPATIENT)
Dept: LAB | Facility: HOSPITAL | Age: 69
End: 2024-11-25
Payer: MEDICARE

## 2024-11-25 DIAGNOSIS — R97.20 ELEVATED PSA: ICD-10-CM

## 2024-11-25 DIAGNOSIS — R39.89 SUSPECTED UTI: ICD-10-CM

## 2024-11-25 LAB
ALBUMIN SERPL BCG-MCNC: 4.2 G/DL (ref 3.5–5)
ALP SERPL-CCNC: 53 U/L (ref 34–104)
ALT SERPL W P-5'-P-CCNC: 8 U/L (ref 7–52)
ANION GAP SERPL CALCULATED.3IONS-SCNC: 5 MMOL/L (ref 4–13)
AST SERPL W P-5'-P-CCNC: 10 U/L (ref 13–39)
ATRIAL RATE: 102 BPM
BASOPHILS # BLD AUTO: 0.03 THOUSANDS/ΜL (ref 0–0.1)
BASOPHILS NFR BLD AUTO: 1 % (ref 0–1)
BILIRUB SERPL-MCNC: 0.81 MG/DL (ref 0.2–1)
BUN SERPL-MCNC: 24 MG/DL (ref 5–25)
CALCIUM SERPL-MCNC: 9.4 MG/DL (ref 8.4–10.2)
CHLORIDE SERPL-SCNC: 105 MMOL/L (ref 96–108)
CO2 SERPL-SCNC: 29 MMOL/L (ref 21–32)
CREAT SERPL-MCNC: 1.26 MG/DL (ref 0.6–1.3)
EOSINOPHIL # BLD AUTO: 0.06 THOUSAND/ΜL (ref 0–0.61)
EOSINOPHIL NFR BLD AUTO: 1 % (ref 0–6)
ERYTHROCYTE [DISTWIDTH] IN BLOOD BY AUTOMATED COUNT: 13.4 % (ref 11.6–15.1)
GFR SERPL CREATININE-BSD FRML MDRD: 57 ML/MIN/1.73SQ M
GLUCOSE SERPL-MCNC: 92 MG/DL (ref 65–140)
HCT VFR BLD AUTO: 41.7 % (ref 36.5–49.3)
HGB BLD-MCNC: 13.8 G/DL (ref 12–17)
IMM GRANULOCYTES # BLD AUTO: 0.02 THOUSAND/UL (ref 0–0.2)
IMM GRANULOCYTES NFR BLD AUTO: 0 % (ref 0–2)
LYMPHOCYTES # BLD AUTO: 1.72 THOUSANDS/ΜL (ref 0.6–4.47)
LYMPHOCYTES NFR BLD AUTO: 27 % (ref 14–44)
MCH RBC QN AUTO: 27.9 PG (ref 26.8–34.3)
MCHC RBC AUTO-ENTMCNC: 33.1 G/DL (ref 31.4–37.4)
MCV RBC AUTO: 84 FL (ref 82–98)
MONOCYTES # BLD AUTO: 0.46 THOUSAND/ΜL (ref 0.17–1.22)
MONOCYTES NFR BLD AUTO: 7 % (ref 4–12)
NEUTROPHILS # BLD AUTO: 3.98 THOUSANDS/ΜL (ref 1.85–7.62)
NEUTS SEG NFR BLD AUTO: 64 % (ref 43–75)
NRBC BLD AUTO-RTO: 0 /100 WBCS
P AXIS: 64 DEGREES
PLATELET # BLD AUTO: 132 THOUSANDS/UL (ref 149–390)
PLATELET BLD QL SMEAR: ABNORMAL
PMV BLD AUTO: 10.5 FL (ref 8.9–12.7)
POTASSIUM SERPL-SCNC: 4.5 MMOL/L (ref 3.5–5.3)
PR INTERVAL: 132 MS
PROT SERPL-MCNC: 7.1 G/DL (ref 6.4–8.4)
QRS AXIS: 138 DEGREES
QRSD INTERVAL: 120 MS
QT INTERVAL: 344 MS
QTC INTERVAL: 449 MS
RBC # BLD AUTO: 4.95 MILLION/UL (ref 3.88–5.62)
RBC MORPH BLD: NORMAL
SODIUM SERPL-SCNC: 139 MMOL/L (ref 135–147)
T WAVE AXIS: 60 DEGREES
VENTRICULAR RATE: 102 BPM
WBC # BLD AUTO: 6.27 THOUSAND/UL (ref 4.31–10.16)

## 2024-11-25 PROCEDURE — 88305 TISSUE EXAM BY PATHOLOGIST: CPT | Performed by: STUDENT IN AN ORGANIZED HEALTH CARE EDUCATION/TRAINING PROGRAM

## 2024-11-25 PROCEDURE — 80053 COMPREHEN METABOLIC PANEL: CPT

## 2024-11-25 PROCEDURE — 87086 URINE CULTURE/COLONY COUNT: CPT

## 2024-11-25 PROCEDURE — 36415 COLL VENOUS BLD VENIPUNCTURE: CPT

## 2024-11-25 PROCEDURE — 85025 COMPLETE CBC W/AUTO DIFF WBC: CPT

## 2024-11-25 PROCEDURE — 88342 IMHCHEM/IMCYTCHM 1ST ANTB: CPT | Performed by: STUDENT IN AN ORGANIZED HEALTH CARE EDUCATION/TRAINING PROGRAM

## 2024-11-25 PROCEDURE — 93010 ELECTROCARDIOGRAM REPORT: CPT | Performed by: INTERNAL MEDICINE

## 2024-11-26 ENCOUNTER — RESULTS FOLLOW-UP (OUTPATIENT)
Dept: DERMATOLOGY | Facility: CLINIC | Age: 69
End: 2024-11-26

## 2024-11-26 DIAGNOSIS — D04.62 SQUAMOUS CELL CARCINOMA IN SITU (SCCIS) OF SKIN OF LEFT UPPER ARM: Primary | ICD-10-CM

## 2024-11-26 LAB — BACTERIA UR CULT: NORMAL

## 2024-11-26 RX ORDER — FLUOROURACIL 50 MG/G
CREAM TOPICAL
Qty: 40 G | Refills: 0 | Status: SHIPPED | OUTPATIENT
Start: 2024-11-26

## 2024-11-26 NOTE — TELEPHONE ENCOUNTER
Rec'd call from patient questioning if he had to wash the area of concern prior to putting on second application of the day? Spoke to CTS. If patient showers at night, put it on after shower. If patient showers in the morning, area does not need to be cleared prior to applying second dose. First dose then, for tomorrow, would be after morning shower.    Informed patient of instructions. Patient verbalized understanding.    Patient will return call to office, if needed, for questions/concerns.

## 2024-12-02 NOTE — PRE-PROCEDURE INSTRUCTIONS
Pre-Surgery Instructions:   Medication Instructions    amLODIPine (NORVASC) 10 mg tablet Take day of surgery.    aspirin (ECOTRIN LOW STRENGTH) 81 mg EC tablet Take day of surgery.    famotidine (PEPCID) 20 mg tablet Take day of surgery.    fluocinonide (LIDEX) 0.05 % cream Hold day of surgery.    fluorouracil (EFUDEX) 5 % cream Hold day of surgery.    loratadine (CLARITIN) 10 mg tablet Take day of surgery.    losartan (COZAAR) 100 MG tablet Hold day of surgery.    mirtazapine (REMERON) 15 mg tablet Uses PRN- OK to take day of surgery    mycophenolate (CELLCEPT) 250 mg capsule Take day of surgery.    pravastatin (PRAVACHOL) 40 mg tablet Take day of surgery.    tacrolimus (PROGRAF) 0.5 mg capsule Take day of surgery.    tacrolimus (PROGRAF) 1 mg capsule Take day of surgery.    tadalafil (CIALIS) 5 MG tablet Take day of surgery.    tamsulosin (FLOMAX) 0.4 mg Take night before surgery   Medication instructions for day surgery reviewed. Please use only a sip of water to take your instructed medications. Avoid all over the counter vitamins, supplements and NSAIDS for one week prior to surgery per anesthesia guidelines. Tylenol is ok to take as needed.     You will receive a call one business day prior to surgery with an arrival time and hospital directions. If your surgery is scheduled on a Monday, the hospital will be calling you on the Friday prior to your surgery. If you have not heard from anyone by 8pm, please call the hospital supervisor through the hospital  at [phone]. ([place] [phone] or [place] [phone]).    Do not eat or drink anything after midnight the night before your surgery, including candy, mints, lifesavers, or chewing gum. Do not drink alcohol 24hrs before your surgery. Try not to smoke at least 24hrs before your surgery.       Follow the pre surgery showering instructions as listed in the “My Surgical Experience Booklet” or otherwise provided by your surgeon's office. Do not  use a blade to shave the surgical area 1 week before surgery. It is okay to use a clean electric clippers up to 24 hours before surgery. Do not apply any lotions, creams, including makeup, cologne, deodorant, or perfumes after showering on the day of your surgery. Do not use dry shampoo, hair spray, hair gel, or any type of hair products.     No contact lenses, eye make-up, or artificial eyelashes. Remove nail polish, including gel polish, and any artificial, gel, or acrylic nails if possible. Remove all jewelry including rings and body piercing jewelry.     Wear causal clothing that is easy to take on and off. Consider your type of surgery.    Keep any valuables, jewelry, piercings at home. Please bring any specially ordered equipment (sling, braces) if indicated.    Arrange for a responsible person to drive you to and from the hospital on the day of your surgery. Please confirm the visitor policy for the day of your procedure when you receive your phone call with an arrival time.     Call the surgeon's office with any new illnesses, exposures, or additional questions prior to surgery.    Please reference your “My Surgical Experience Booklet” for additional information to prepare for your upcoming surgery.     Aware of 1 enema 1 hour prior to leaving the house for the procedure.

## 2024-12-09 ENCOUNTER — ANESTHESIA EVENT (OUTPATIENT)
Dept: PERIOP | Facility: HOSPITAL | Age: 69
End: 2024-12-09
Payer: MEDICARE

## 2024-12-11 ENCOUNTER — ANESTHESIA (OUTPATIENT)
Dept: PERIOP | Facility: HOSPITAL | Age: 69
End: 2024-12-11
Payer: MEDICARE

## 2024-12-11 ENCOUNTER — HOSPITAL ENCOUNTER (OUTPATIENT)
Facility: HOSPITAL | Age: 69
Setting detail: OUTPATIENT SURGERY
Discharge: HOME | End: 2024-12-11
Payer: MEDICARE

## 2024-12-11 VITALS
DIASTOLIC BLOOD PRESSURE: 78 MMHG | OXYGEN SATURATION: 98 % | HEART RATE: 89 BPM | SYSTOLIC BLOOD PRESSURE: 115 MMHG | BODY MASS INDEX: 24.79 KG/M2 | HEIGHT: 65 IN | WEIGHT: 148.81 LBS | TEMPERATURE: 97.5 F | RESPIRATION RATE: 16 BRPM

## 2024-12-11 DIAGNOSIS — R97.20 ELEVATED PSA: ICD-10-CM

## 2024-12-11 PROCEDURE — 88341 IMHCHEM/IMCYTCHM EA ADD ANTB: CPT | Performed by: PATHOLOGY

## 2024-12-11 PROCEDURE — NC001 PR NO CHARGE

## 2024-12-11 PROCEDURE — G0416 PROSTATE BIOPSY, ANY MTHD: HCPCS | Performed by: PATHOLOGY

## 2024-12-11 PROCEDURE — 88342 IMHCHEM/IMCYTCHM 1ST ANTB: CPT | Performed by: PATHOLOGY

## 2024-12-11 PROCEDURE — 55700 PR PROSTATE NEEDLE BIOPSY ANY APPROACH: CPT

## 2024-12-11 PROCEDURE — 88344 IMHCHEM/IMCYTCHM EA MLT ANTB: CPT | Performed by: PATHOLOGY

## 2024-12-11 PROCEDURE — 76942 ECHO GUIDE FOR BIOPSY: CPT

## 2024-12-11 RX ORDER — LIDOCAINE HYDROCHLORIDE 20 MG/ML
INJECTION, SOLUTION EPIDURAL; INFILTRATION; INTRACAUDAL; PERINEURAL AS NEEDED
Status: DISCONTINUED | OUTPATIENT
Start: 2024-12-11 | End: 2024-12-11 | Stop reason: HOSPADM

## 2024-12-11 RX ORDER — PROPOFOL 10 MG/ML
INJECTION, EMULSION INTRAVENOUS CONTINUOUS PRN
Status: DISCONTINUED | OUTPATIENT
Start: 2024-12-11 | End: 2024-12-11

## 2024-12-11 RX ORDER — SODIUM CHLORIDE 9 MG/ML
125 INJECTION, SOLUTION INTRAVENOUS CONTINUOUS
Status: DISCONTINUED | OUTPATIENT
Start: 2024-12-11 | End: 2024-12-11 | Stop reason: HOSPADM

## 2024-12-11 RX ORDER — PROPOFOL 10 MG/ML
INJECTION, EMULSION INTRAVENOUS AS NEEDED
Status: DISCONTINUED | OUTPATIENT
Start: 2024-12-11 | End: 2024-12-11

## 2024-12-11 RX ORDER — LIDOCAINE HYDROCHLORIDE 20 MG/ML
INJECTION, SOLUTION EPIDURAL; INFILTRATION; INTRACAUDAL; PERINEURAL AS NEEDED
Status: DISCONTINUED | OUTPATIENT
Start: 2024-12-11 | End: 2024-12-11

## 2024-12-11 RX ADMIN — PROPOFOL 50 MG: 10 INJECTION, EMULSION INTRAVENOUS at 08:35

## 2024-12-11 RX ADMIN — PROPOFOL 30 MG: 10 INJECTION, EMULSION INTRAVENOUS at 08:31

## 2024-12-11 RX ADMIN — SODIUM CHLORIDE 125 ML/HR: 0.9 INJECTION, SOLUTION INTRAVENOUS at 07:18

## 2024-12-11 RX ADMIN — PROPOFOL 50 MG: 10 INJECTION, EMULSION INTRAVENOUS at 08:30

## 2024-12-11 RX ADMIN — PROPOFOL 40 MG: 10 INJECTION, EMULSION INTRAVENOUS at 08:32

## 2024-12-11 RX ADMIN — PROPOFOL 50 MG: 10 INJECTION, EMULSION INTRAVENOUS at 08:23

## 2024-12-11 RX ADMIN — LIDOCAINE HYDROCHLORIDE 100 MG: 20 INJECTION, SOLUTION EPIDURAL; INFILTRATION; INTRACAUDAL at 08:23

## 2024-12-11 RX ADMIN — PROPOFOL 100 MCG/KG/MIN: 10 INJECTION, EMULSION INTRAVENOUS at 08:23

## 2024-12-11 NOTE — ANESTHESIA POSTPROCEDURE EVALUATION
Post-Op Assessment Note    CV Status:  Stable    Pain management: adequate       Mental Status:  Alert and awake   Hydration Status:  Euvolemic   PONV Controlled:  Controlled   Airway Patency:  Patent     Post Op Vitals Reviewed: Yes    No anethesia notable event occurred.    Staff: Anesthesiologist           Last Filed PACU Vitals:  Vitals Value Taken Time   Temp 99 °F (37.2 °C) 12/11/24 0851   Pulse 84 12/11/24 0912   BP 94/56 12/11/24 0905   Resp 14 12/11/24 0912   SpO2 99 % 12/11/24 0912   Vitals shown include unfiled device data.    Modified Park:  Activity: 2 (12/11/2024  8:51 AM)  Respiration: 2 (12/11/2024  8:51 AM)  Circulation: 2 (12/11/2024  8:51 AM)  Consciousness: 1 (12/11/2024  8:51 AM)  Oxygen Saturation: 2 (12/11/2024  8:51 AM)  Modified Park Score: 9 (12/11/2024  8:51 AM)

## 2024-12-11 NOTE — DISCHARGE INSTR - AVS FIRST PAGE
Mr. Chel Rea,      Your biopsy procedure went well.  I was able to obtain the samples from throughout the prostate and have sent these to Pathology for their review.  The results should be back in 5-7 business days.  There should be an appointment set up to discuss the results in person with Dr. Early    Some blood in the urine is normal for approximately 1 week after surgery.  It can last in the ejaculate for up to 1 month.    Most patients do not need prescription medications after this procedure (including no antibiotics or narcotic pain medications).  Please try taking Tylenol and Motrin over-the-counter if you have discomfort.  If you are having significant pain issues please contact me.    Please call us immediately if you are having fevers or chills or feel like you have a infection.    Some patients can have difficulty with voiding after a biopsy because of swelling of the prostate which can block the urethra.  This usually improves with time but if you are having difficulty voiding please let us know as we may need to temporarily insert a catheter.      You have any questions or concerns please do not hesitate to call us, [phone].    Nima Bee MD   [facility]   [facility]

## 2024-12-11 NOTE — H&P
Assessment & Plan  1. Elevated PSA  Assessment & Plan:  PSA remains elevated at 29.897.  On MRI his prostate is over 200 g and the PSA density is 11%.  The MRI last year was felt to represent a PI-RADS 2 prostate.  Options were discussed with the patient and I did recommend we obtain a 4K score.  Depending on the results of this we will consider proceeding with transperineal biopsies.  Orders:  -     MISCELLANEOUS LAB TEST; Future; Expected date: 08/24/2024  -     PSA, total and free; Future; Expected date: 02/23/2025  2. Benign prostatic hyperplasia with urinary obstruction  Assessment & Plan:  AUA symptom score is 7.  He is pleased with his voiding pattern.  He remains on tamsulosin.  He is also taking Cialis daily which she feels is helping as well.  We will continue present therapy.  Orders:  -     tamsulosin (FLOMAX) 0.4 mg; Take 1 capsule (0.4 mg total) by mouth daily with dinner  -     tadalafil (CIALIS) 5 MG tablet; Take 1 tablet (5 mg total) by mouth daily  3. Heart transplant, heterotopic, status ([facility])  4. Thrombocytopenia ([facility])  5. Combined arterial insufficiency and corporo-venous occlusive erectile dysfunction  Assessment & Plan:  He is doing well with Cialis daily.  His prescription was refilled.  Orders:  -     tadalafil (CIALIS) 5 MG tablet; Take 1 tablet (5 mg total) by mouth daily        History of Present Illness     Chel Rea is a 68 y.o. male who presents for follow-up. He is voiding well now after treatment of his UTI. Stream is good and he feels he is emptying well. Nocturia x 2-3. He is happy with his voiding pattern.  No gross hematuria, dysuria, incontinence or symptoms of infection.  He is happy with his voiding pattern.  He underwent heart transplant in 2017 and is on immunosuppression.      Review of Systems   Constitutional:  Negative for chills, diaphoresis, fatigue and fever.   HENT: Negative.     Eyes: Negative.    Respiratory: Negative.     Cardiovascular: Negative.     Endocrine: Negative.    Genitourinary:         See HPI   Musculoskeletal: Negative.    Skin: Negative.    Allergic/Immunologic: Negative.    Neurological: Negative.    Hematological: Negative.    Psychiatric/Behavioral: Negative.           AUA SYMPTOM SCORE       Flowsheet Row Most Recent Value   AUA SYMPTOM SCORE     How often have you had a sensation of not emptying your bladder completely after you finished urinating? 1 (P)     How often have you had to urinate again less than two hours after you finished urinating? 2 (P)     How often have you found you stopped and started again several times when you urinate? 1 (P)     How often have you found it difficult to postpone urination? 0 (P)     How often have you had a weak urinary stream? 2 (P)     How often have you had to push or strain to begin urination? 0 (P)     How many times did you most typically get up to urinate from the time you went to bed at night until the time you got up in the morning? 1 (P)     Quality of Life: If you were to spend the rest of your life with your urinary condition just the way it is now, how would you feel about that? 2 (P)     AUA SYMPTOM SCORE 7 (P)       Vitals:    12/11/24 0655   BP: 132/80   Pulse: 100   Resp: 16   Temp: 97.7 °F (36.5 °C)   SpO2: 98%      General: Well appearing, no acute distress   HEENT: normocephalic, atraumatic  Eyes: extraocular movements intact  Cardiovascular: normal rate   Respiratory: no respiratory distress, effort normal   Abdominal: Non-distended, non-tender   : Deferred  MSK: Grossly normal range of motion   Neurological: No gross focal deficits  Behavioral: Normal mood and affect       Will proceed with transperineal prostate biopsy.    Nima Wolfe MD   [facility]   [facility]

## 2024-12-11 NOTE — OP NOTE
OPERATIVE REPORT  PATIENT NAME: Chel Rea    :  1955  MRN: [id number]    SURGERY DATE: [unfilled]    Surgeons and Role:  Surgeons and Role:     * Nima Wolfe MD - Primary    Preop Diagnosis:  Elevated PSA     Post-Op Diagnosis Codes:     * Elevated PSA [R97.20]    Postop Diagnosis:   As above    Procedure(s):  transperineal saturation prostate biopsy utilizing the Precision Point perineal access device utilized to map the standard geographic sites of the prostate.    Specimen(s):  ID Type Source Tests Collected by Time Destination   1 : RIGHT ANTERIOR MEDIAL Tissue Prostate TISSUE EXAM Nima Wolfe MD 2024 08    2 : Right Anterior lateral Tissue Prostate TISSUE EXAM Nima Wolfe MD 2024 08    3 : Right posterior lateral Tissue Prostate TISSUE EXAM Nima Wolfe MD 2024 08    4 : Left Anterior Medial Tissue Prostate TISSUE EXAM Nima Wolfe MD 2024 08    5 : left anterior lateral Tissue Prostate TISSUE EXAM Nima Wolfe MD 2024 08    6 : Left posterior lateral Tissue Prostate TISSUE EXAM Nima Wolfe MD 2024 0824    7 : left posterior medial Tissue Prostate TISSUE EXAM Nima Wolfe MD 2024 08    8 : Left base Tissue Prostate TISSUE EXAM Nima Wolfe MD 2024 0824    9 : right posterior medial Tissue Prostate TISSUE EXAM Nima Wolfe MD 2024 0824    10 : right base Tissue Prostate TISSUE EXAM Nima Wolfe MD 2024 0824        Estimated Blood Loss:   Minimal    Drains:  None    Anesthesia Type:   IV Sedation with Anesthesia    Complications:   None    Patient Disposition:  PACU     Indications:   Chel Rea is a 69 y.o. male with history of elevated PSA. Patient has not undergone prior biopsy of the prostate.    Patient did undergo pre biopsy multiparametric MRI of the prostate. MRI was PIRADS 2 so the patient was scheduled for transperineal saturation prostate biopsy.    The patient was scheduled for his  procedure in an outpatient surgical context with the assistance of the anesthesia team for sedation. He was met in the preoperative holding area by the performing urologist, the anesthesia team and the intraoperative nursing staff. The procedure along with its risks and benefits were discussed and reviewed. Patient signed an informed consent.       OPERATIVE REPORT IN DETAIL:     Patient was then transferred to procedure suite. Pre-procedural time out was performed with all parties present. He was treated with periprocedural Ancef. He was placed in dorsal lithotomy with care to pad all pressure points. His perineum and genitalia were shave/prepped in the usual sterile fashion. The scrotum was elevated a secured aware from the perineum above the rectum.      Side-fire, biplanar transrectal ultrasound was introduced and the prostate was imaged in the sagittal and axial views. Prostate gland measurements noted: 271cc from MRI.    With the assistance of the UroNav technician, a survey of the prostate anatomy was performed. The technician then linked the previously obtained MRI images to the real-time ultrasound. The PIRADs lesions seen on MRI were identified on the ultrasound.     In the midportion of the left and right prostate, a sindi was denoted in the perineal skin. Skin wheal was elevated with 0.5% Marcaine plain on either side.    The PrecisionPoint access needle was then introduced into the left perineal subcutaneous tissue. We visualized the tip of the needle. Spinal needle was introduced through the subcutaneous fat and into the pelvic floor muscle where a perineal pudendal block was performed with additional local anesthetic. This was repeated on the patient's right side.    Utilizing the Precision Point access needle and stepper, ultrasound guidance was utilized to place the spring-loaded biopsy needle into the prostate to take our standard transperineal samples, which allowed us to carefully map and saturate  each of the 10 zones that have subdivided the prostate into zonal anatomy.     The Precision Point access needle and stepper was positioned into the RIGHT perineal space and ultrasound guidance was utilized to place the spring-loaded biopsy needle into the following areas    RIGHT anterior medial: 2 biopsies taken  RIGHT posterior medial: 2biopsies taken  RIGHT posterior lateral: 2biopsies taken  RIGHT base: 2biopsies taken  RIGHT anterior lateral: 2 biopsies taken  The Precision Point access needle and stepper was re-positioned into the LEFT perineal space and ultrasound guidance was utilized to place the spring-loaded biopsy needle into the following areas  LEFT anterior medial: 2biopsies taken  LEFT posterior medial: 2biopsies taken  LEFT posterior lateral: 2 biopsies taken  LEFT base: 2 biopsies taken  LEFT anterior lateral: 2 biopsies taken  LEFT anterior medial: 2 biopsies taken    Total biopsies (including standard transperineal saturation and additional fusion biopsy):20    Transrectal ultrasound removed. The scrotum was released. Some gentle pressure was placed on the perineum for approximately 5 minutes for hemostasis.    At the completion of the procedure, the patient was taken out of dorsal lithotomy, recovered from their anesthesia, and transferred to PACU in good condition.     Overall, the patient tolerated the procedure and there were no complications.    Plan: The patient will be monitored in recovery, allowed to void prior to discharge and return for biopsy pathology review in the office with their primary urologist.    I performed the entire procedure as the attending surgeon.    Nima Wolfe MD   [facility]   [facility]

## 2024-12-11 NOTE — ANESTHESIA PREPROCEDURE EVALUATION
Procedure:  TRANSPERINEAL ULTRASOUND GUIDED BX PROSTATE (Perineum)    Relevant Problems   CARDIO   (+) Combined arterial insufficiency and corporo-venous occlusive erectile dysfunction   (+) Hyperlipidemia LDL goal <100   (+) Hypertension      GI/HEPATIC   (+) GERD (gastroesophageal reflux disease)      /RENAL   (+) Benign prostatic hyperplasia with urinary obstruction   (+) Stage 3a chronic kidney disease (HCC)      HEMATOLOGY   (+) Thrombocytopenia (HCC)      Surgery/Wound/Pain   (+) Heart transplant, heterotopic, status (HCC)      Cardiovascular/Peripheral Vascular   (+) Chronic systolic heart failure (HCC)   2022 Stress Echo:  Sinus tachycardia (164 bpm) at low-level exercise.   No exercise-induced angina.   Normal BP response to exercise.   No exercise-induced ventricular tachycardia.   No ischemia. No myocardial scar. Low probability of significant fixed   obstructive CAD.   Normal LV and RV size and systolic function.   LV filling pressure not assessed.   No pulmonary hypertension.   No valve disease.   No pericardial disease.   [facility] treadmill score predicts a low (<1%) 1 year cardiovascular mortality.      Physical Exam    Airway    Mallampati score: II  TM Distance: <3 FB       Dental    upper dentures    Cardiovascular  Rhythm: regular, Rate: normal    Pulmonary   Breath sounds clear to auscultation    Other Findings        Anesthesia Plan  ASA Score- 2     Anesthesia Type- IV sedation with anesthesia with ASA Monitors.         Additional Monitors:     Airway Plan:     Comment: GA PRN.       Plan Factors-Exercise tolerance (METS): >4 METS.    Chart reviewed.   Existing labs reviewed.     Patient is not a current smoker.      Obstructive sleep apnea risk education given perioperatively.        Induction- intravenous.    Postoperative Plan- Plan for postoperative opioid use.         Informed Consent- Anesthetic plan and risks discussed with patient.

## 2024-12-11 NOTE — ANESTHESIA POSTPROCEDURE EVALUATION
Post-Op Assessment Note    CV Status:  Stable    Pain management: adequate       Mental Status:  Alert and awake   Hydration Status:  Euvolemic   PONV Controlled:  Controlled   Airway Patency:  Patent  Two or more mitigation strategies used for obstructive sleep apnea   Post Op Vitals Reviewed: Yes      Staff: CRNA           Last Filed PACU Vitals:  Vitals Value Taken Time   Temp     Pulse 94 12/11/24 0851   BP 89/59 12/11/24 0850   Resp     SpO2 100 % 12/11/24 0851   Vitals shown include unfiled device data.    Modified Park:  No data recorded

## 2024-12-12 DIAGNOSIS — N52.03 COMBINED ARTERIAL INSUFFICIENCY AND CORPORO-VENOUS OCCLUSIVE ERECTILE DYSFUNCTION: ICD-10-CM

## 2024-12-12 DIAGNOSIS — N40.1 BENIGN PROSTATIC HYPERPLASIA WITH URINARY OBSTRUCTION: ICD-10-CM

## 2024-12-12 DIAGNOSIS — N13.8 BENIGN PROSTATIC HYPERPLASIA WITH URINARY OBSTRUCTION: ICD-10-CM

## 2024-12-12 RX ORDER — TADALAFIL 5 MG/1
5 TABLET ORAL DAILY
Qty: 30 TABLET | Refills: 5 | Status: SHIPPED | OUTPATIENT
Start: 2024-12-12

## 2024-12-15 NOTE — PROGRESS NOTES
Name: Chel Rea      : 1955      MRN: [id number]  Encounter Provider: Tammy Montoya DO  Encounter Date: 2024   Encounter department: [facility] [place]  :  Assessment & Plan  History of colon polyps  Patient with a prior history of colonic polyps.  In  he had a CT colonoscopy revealed a larger polyp.  This was then followed up by a colonoscopy that same year during which patient was found to have a 24 mm tubulovillous adenoma along with a 8 mm tubular adenoma that was removed.  Patient recommended x 3-year recall and is now overdue for colonoscopy at this time.  Fortunately, patient without presence of alarm symptoms including change in her bowel habits, rectal bleeding, or unintentional weight loss.  However, he does admit to a family history of colon cancer that was diagnosed in his father but patient unclear of father's age at time of diagnosis.    At this time, will proceed with colonoscopy. Discussed the benefits and risks associated with colonoscopy including infection, bleeding, chance of missed polyp, and in very rare cases, colonic perforation. Patient understanding and accepting of these risks. Additionally, discussed the importance of proper completion of bowel preparation to ensure adequate examination. Patient's questions answered to satisfaction. Orders for GoLytely bowel preparation placed and will proceed with scheduling outpatient colonoscopy.     Plan:  Schedule for outpatient colonoscopy   Orders placed for GoLytely bowel preparation   Advised clear liquid diet (no red, blue, or purple liquids) 1 day prior and NPO the night prior to scheduled colonoscopy   Encouraged increased PO hydration during completion of bowel prep    Orders:    Colonoscopy; Future    polyethylene glycol (Golytely) 4000 mL solution; Take 4,000 mL by mouth once for 1 dose Take 4000 mL by mouth once for 1 dose. Use as directed    Gastroesophageal reflux disease  without esophagitis  Patient with a past medical history of GERD.  More pronounced over the last 2 years but occurred intermittently for years prior to this.  Symptoms currently well-controlled on Pepcid 20 mg daily.  Patient also without alarm symptoms however has never had an EGD performed.  Given his longstanding history of GERD and multiple risk factors including male gender, age greater than 50, and prior history of tobacco use, we will schedule for screening EGD for Thapa's esophagus.    Plan:  Okay to continue on famotidine 20 mg daily  Schedule for EGD for Thapa's screening  Discussed the importance of lifestyle modifications including:  Recommend small meals and avoidance of ulcerogenic foods   Avoid eating prior to bedtime, elevate head of bed at night  Limit intake of alcohol and caffeine   Okay to utilize Tylenol but avoid use of all NSAIDs  Daily exercise to promote weight loss    Orders:    EGD; Future    History of heart transplant (HCC)  Patient with prior OHT in 2017 completed at [facility] for nonischemic cardiomyopathy. Currently on Tacrolimus and CellCept. Patient previously followed with [facility] cardiology but now currently follows with [facility].  Patient was last seen by Dr. Ike Junior on 3/2024.  Last echocardiogram completed 9/2023 with LVEF of 65%.  Patient overall asymptomatic from cardiac standpoint and also has had no issues posttransplant.  Will send message to cardiology team to obtain clearance prior to EGD/colonoscopy.  Will also send message to anesthesia to confirm that procedures can be completed at [facility] as opposed to alternative hospital setting.         History of Present Illness     Chel Rea is a 69 y.o. male with a PMHx of prior heart transplant (2017), HTN, GERD, BPH, CKD3a, and HLD who presents to the office today, 12/16/2024, as a new patient consultation to discuss colon cancer screening.     During today's visit, patient states that he is  doing well overall.  Asymptomatic from a GI standpoint.  Denies any lower GI complaints including rectal bleeding, changes in bowel habits, or unintentional weight loss.  He does admit that he has a family history of colon cancer that was diagnosed in his father but patient unclear of age.  He does also admit to longstanding history of reflux.  However, he states that symptoms are currently well-controlled on his current antacid regimen of Pepcid 20 mg daily.  He denies any prior history of PPI use.  States that symptoms are only problematic when he eats spicy foods.  Occasionally symptoms worse at night.  Denies any issues with dysphagia.  No nausea/vomiting or regurgitation.  He has never had an EGD completed.. Patient not currently on AP/AC.     Of note: Last colonoscopy completed 2017 which revealed x1 24 mm tubulovillous adenoma in the ascending colon and a 8 mm tubular adenoma in sigmoid colon.  X3 year recommended. Blood work last checked on 11/25/2024 during which LFTs were normal and were persistently normal in the past. Hemoglobin 13.8.     History obtained from: patient    Medical History Reviewed by provider this encounter:     .  Current Outpatient Medications on File Prior to Visit   Medication Sig Dispense Refill    amLODIPine (NORVASC) 10 mg tablet Take 1 tablet (10 mg total) by mouth daily 90 tablet 3    aspirin (ECOTRIN LOW STRENGTH) 81 mg EC tablet Take 81 mg by mouth daily      famotidine (PEPCID) 20 mg tablet Take 20 mg by mouth daily      fluocinonide (LIDEX) 0.05 % cream Use 2 times a day as needed to affected areas 60 g 2    fluorouracil (EFUDEX) 5 % cream Use twice a day in a thin layer for 6 weeks 40 g 0    loratadine (CLARITIN) 10 mg tablet Take 10 mg by mouth daily      losartan (COZAAR) 100 MG tablet Take 100 mg by mouth daily      mirtazapine (REMERON) 15 mg tablet Take 15 mg by mouth daily as needed (as needed)      mycophenolate (CELLCEPT) 250 mg capsule TAKE 2 CAPSULES (500 MG) BY  "MOUTH TWO (2) TIMES A  DAY      pravastatin (PRAVACHOL) 40 mg tablet TAKE 1 TABLET (40 MG) BY MOUTH DAILY      tacrolimus (PROGRAF) 0.5 mg capsule Take 0.5 mg by mouth daily at bedtime      tacrolimus (PROGRAF) 1 mg capsule Take 2 mg by mouth 2 (two) times a day      tadalafil (CIALIS) 5 MG tablet TAKE 1 TABLET BY MOUTH DAILY 30 tablet 5    tamsulosin (FLOMAX) 0.4 mg Take 1 capsule (0.4 mg total) by mouth daily with dinner 90 capsule 3     No current facility-administered medications on file prior to visit.      Social History     Tobacco Use    Smoking status: Former     Current packs/day: 0.00     Average packs/day: 2.5 packs/day for 46.0 years (115.0 ttl pk-yrs)     Types: Cigarettes     Start date:      Quit date:      Years since quittin.9    Smokeless tobacco: Never   Vaping Use    Vaping status: Never Used   Substance and Sexual Activity    Alcohol use: Yes     Alcohol/week: 1.0 standard drink of alcohol     Types: 1 Standard drinks or equivalent per week     Comment: 2 beers weekly    Drug use: Never    Sexual activity: Yes     Partners: Male     Birth control/protection: Condom Male        Objective   /84 (BP Location: Left arm, Patient Position: Sitting, Cuff Size: Standard)   Pulse (!) 109   Temp 98.5 °F (36.9 °C) (Tympanic)   Ht 5' 5\" (1.651 m)   Wt 68.9 kg (151 lb 12.8 oz)   SpO2 96%   BMI 25.26 kg/m²      Physical Exam  Constitutional:       General: He is not in acute distress.     Appearance: He is normal weight. He is not ill-appearing or toxic-appearing.   HENT:      Head: Normocephalic and atraumatic.      Nose: Nose normal.   Eyes:      General: No scleral icterus.  Cardiovascular:      Rate and Rhythm: Normal rate.      Pulses: Normal pulses.   Pulmonary:      Effort: Pulmonary effort is normal.   Abdominal:      General: Abdomen is flat. There is no distension.      Tenderness: There is no abdominal tenderness. There is no guarding or rebound.   Musculoskeletal:         " General: Normal range of motion.      Cervical back: Normal range of motion.   Skin:     General: Skin is warm.      Coloration: Skin is not jaundiced or pale.   Neurological:      Mental Status: He is alert and oriented to person, place, and time.   Psychiatric:         Mood and Affect: Mood normal.         Behavior: Behavior normal.

## 2024-12-16 ENCOUNTER — CONSULT (OUTPATIENT)
Dept: GASTROENTEROLOGY | Facility: CLINIC | Age: 69
End: 2024-12-16
Payer: MEDICARE

## 2024-12-16 ENCOUNTER — TELEPHONE (OUTPATIENT)
Dept: GASTROENTEROLOGY | Facility: CLINIC | Age: 69
End: 2024-12-16

## 2024-12-16 VITALS
DIASTOLIC BLOOD PRESSURE: 84 MMHG | OXYGEN SATURATION: 96 % | HEIGHT: 65 IN | HEART RATE: 109 BPM | SYSTOLIC BLOOD PRESSURE: 126 MMHG | WEIGHT: 151.8 LBS | BODY MASS INDEX: 25.29 KG/M2 | TEMPERATURE: 98.5 F

## 2024-12-16 DIAGNOSIS — Z86.0100 HISTORY OF COLON POLYPS: Primary | ICD-10-CM

## 2024-12-16 DIAGNOSIS — K21.9 GASTROESOPHAGEAL REFLUX DISEASE WITHOUT ESOPHAGITIS: ICD-10-CM

## 2024-12-16 DIAGNOSIS — Z94.1 HISTORY OF HEART TRANSPLANT (HCC): ICD-10-CM

## 2024-12-16 PROCEDURE — 88341 IMHCHEM/IMCYTCHM EA ADD ANTB: CPT | Performed by: PATHOLOGY

## 2024-12-16 PROCEDURE — 88344 IMHCHEM/IMCYTCHM EA MLT ANTB: CPT | Performed by: PATHOLOGY

## 2024-12-16 PROCEDURE — 88342 IMHCHEM/IMCYTCHM 1ST ANTB: CPT | Performed by: PATHOLOGY

## 2024-12-16 PROCEDURE — G0416 PROSTATE BIOPSY, ANY MTHD: HCPCS | Performed by: PATHOLOGY

## 2024-12-16 PROCEDURE — 99204 OFFICE O/P NEW MOD 45 MIN: CPT | Performed by: INTERNAL MEDICINE

## 2024-12-16 RX ORDER — SODIUM CHLORIDE, SODIUM LACTATE, POTASSIUM CHLORIDE, CALCIUM CHLORIDE 600; 310; 30; 20 MG/100ML; MG/100ML; MG/100ML; MG/100ML
125 INJECTION, SOLUTION INTRAVENOUS CONTINUOUS
OUTPATIENT
Start: 2024-12-16

## 2024-12-16 RX ORDER — POLYETHYLENE GLYCOL 3350, SODIUM SULFATE ANHYDROUS, SODIUM BICARBONATE, SODIUM CHLORIDE, POTASSIUM CHLORIDE 236; 22.74; 6.74; 5.86; 2.97 G/4L; G/4L; G/4L; G/4L; G/4L
4000 POWDER, FOR SOLUTION ORAL ONCE
Qty: 4000 ML | Refills: 0 | Status: SHIPPED | OUTPATIENT
Start: 2024-12-16 | End: 2024-12-27 | Stop reason: SDUPTHER

## 2024-12-16 NOTE — ASSESSMENT & PLAN NOTE
Patient with a past medical history of GERD.  More pronounced over the last 2 years but occurred intermittently for years prior to this.  Symptoms currently well-controlled on Pepcid 20 mg daily.  Patient also without alarm symptoms however has never had an EGD performed.  Given his longstanding history of GERD and multiple risk factors including male gender, age greater than 50, and prior history of tobacco use, we will schedule for screening EGD for Thapa's esophagus.    Plan:  Okay to continue on famotidine 20 mg daily  Schedule for EGD for Thapa's screening  Discussed the importance of lifestyle modifications including:  Recommend small meals and avoidance of ulcerogenic foods   Avoid eating prior to bedtime, elevate head of bed at night  Limit intake of alcohol and caffeine   Okay to utilize Tylenol but avoid use of all NSAIDs  Daily exercise to promote weight loss    Orders:    EGD; Future

## 2024-12-16 NOTE — TELEPHONE ENCOUNTER
Procedure: Colon/EGD  Date: 01/07/2025   Physician performing: Dr. Cantu   Location of procedure:    Instructions given to patient: YES  Diabetic: NO  Clearances: N/A

## 2024-12-16 NOTE — ASSESSMENT & PLAN NOTE
Patient with a prior history of colonic polyps.  In 2017 he had a CT colonoscopy revealed a larger polyp.  This was then followed up by a colonoscopy that same year during which patient was found to have a 24 mm tubulovillous adenoma along with a 8 mm tubular adenoma that was removed.  Patient recommended x 3-year recall and is now overdue for colonoscopy at this time.  Fortunately, patient without presence of alarm symptoms including change in her bowel habits, rectal bleeding, or unintentional weight loss.  However, he does admit to a family history of colon cancer that was diagnosed in his father but patient unclear of father's age at time of diagnosis.    At this time, will proceed with colonoscopy. Discussed the benefits and risks associated with colonoscopy including infection, bleeding, chance of missed polyp, and in very rare cases, colonic perforation. Patient understanding and accepting of these risks. Additionally, discussed the importance of proper completion of bowel preparation to ensure adequate examination. Patient's questions answered to satisfaction. Orders for GoLytely bowel preparation placed and will proceed with scheduling outpatient colonoscopy.     Plan:  Schedule for outpatient colonoscopy   Orders placed for GoLytely bowel preparation   Advised clear liquid diet (no red, blue, or purple liquids) 1 day prior and NPO the night prior to scheduled colonoscopy   Encouraged increased PO hydration during completion of bowel prep    Orders:    Colonoscopy; Future    polyethylene glycol (Golytely) 4000 mL solution; Take 4,000 mL by mouth once for 1 dose Take 4000 mL by mouth once for 1 dose. Use as directed

## 2024-12-16 NOTE — ASSESSMENT & PLAN NOTE
Patient with prior OHT in 2017 completed at [facility] for nonischemic cardiomyopathy. Currently on Tacrolimus and CellCept. Patient previously followed with [facility] cardiology but now currently follows with [facility].  Patient was last seen by Dr. Ike Junior on 3/2024.  Last echocardiogram completed 9/2023 with LVEF of 65%.  Patient overall asymptomatic from cardiac standpoint and also has had no issues posttransplant.  Will send message to cardiology team to obtain clearance prior to EGD/colonoscopy.  Will also send message to anesthesia to confirm that procedures can be completed at [facility] as opposed to alternative hospital setting.

## 2024-12-23 ENCOUNTER — RESULTS FOLLOW-UP (OUTPATIENT)
Dept: UROLOGY | Facility: MEDICAL CENTER | Age: 69
End: 2024-12-23

## 2024-12-27 DIAGNOSIS — Z86.0100 HISTORY OF COLON POLYPS: ICD-10-CM

## 2024-12-27 RX ORDER — POLYETHYLENE GLYCOL 3350, SODIUM SULFATE ANHYDROUS, SODIUM BICARBONATE, SODIUM CHLORIDE, POTASSIUM CHLORIDE 236; 22.74; 6.74; 5.86; 2.97 G/4L; G/4L; G/4L; G/4L; G/4L
4000 POWDER, FOR SOLUTION ORAL ONCE
Qty: 4000 ML | Refills: 0 | Status: SHIPPED | OUTPATIENT
Start: 2024-12-27 | End: 2025-01-07 | Stop reason: HOSPADM

## 2024-12-27 NOTE — TELEPHONE ENCOUNTER
Meeta from [facility] requesting Golytely to be sent to the pharmacy they use, [facility]

## 2024-12-30 ENCOUNTER — OFFICE VISIT (OUTPATIENT)
Dept: UROLOGY | Facility: MEDICAL CENTER | Age: 69
End: 2024-12-30
Payer: MEDICARE

## 2024-12-30 VITALS
WEIGHT: 154.6 LBS | SYSTOLIC BLOOD PRESSURE: 120 MMHG | OXYGEN SATURATION: 99 % | BODY MASS INDEX: 25.76 KG/M2 | HEART RATE: 104 BPM | HEIGHT: 65 IN | DIASTOLIC BLOOD PRESSURE: 78 MMHG

## 2024-12-30 DIAGNOSIS — R97.20 ELEVATED PSA: Primary | ICD-10-CM

## 2024-12-30 DIAGNOSIS — N40.1 BENIGN PROSTATIC HYPERPLASIA WITH URINARY OBSTRUCTION: ICD-10-CM

## 2024-12-30 DIAGNOSIS — N13.8 BENIGN PROSTATIC HYPERPLASIA WITH URINARY OBSTRUCTION: ICD-10-CM

## 2024-12-30 PROCEDURE — 99214 OFFICE O/P EST MOD 30 MIN: CPT | Performed by: UROLOGY

## 2024-12-30 NOTE — PROGRESS NOTES
"Name: Chel Rea      : 1955      MRN: [id number]  Encounter Provider: Reyes Early MD  Encounter Date: 2024   Encounter department: [facility] UROLOGY [place]  :  Assessment & Plan  Elevated PSA  Biopsies were negative.  He tolerated the procedure well.  Prostate is very large (272 grams) and this is the likely cause of the PSA elevation.       Benign prostatic hyperplasia with urinary obstruction  Patient remains on tamsulosin.  His symptom score is 7.  He still gets up frequently at night.  We discussed robotic simple prostatectomy as a means of both improving his voiding symptoms and stabilizing/ improving  his PSA.  An added benefit would be the ability to discontinue tamsulosin.  He was interested in discussing with a robotic surgeon and an appointment will be made.           History of Present Illness   Chel Rea is a 69 y.o. male who presents for follow-up post transperineal prostate biopsy for an elevated PSA of 29.897.  He tolerated the procedure well.  He notes he is voiding adequately.  There is no fever or dysuria.  No gross hematuria.  He remains on tamsulosin for his voiding symptoms.    Review of Systems   Constitutional: Negative.  Negative for chills, diaphoresis, fatigue and fever.   HENT: Negative.     Eyes: Negative.    Respiratory: Negative.     Cardiovascular: Negative.    Endocrine: Negative.    Genitourinary:         See HPI   Musculoskeletal: Negative.    Skin: Negative.    Allergic/Immunologic: Negative.    Neurological: Negative.    Hematological: Negative.    Psychiatric/Behavioral: Negative.             Objective   /78 (BP Location: Right arm, Patient Position: Sitting, Cuff Size: Adult)   Pulse 104   Ht 5' 5\" (1.651 m)   Wt 70.1 kg (154 lb 9.6 oz)   SpO2 99%   BMI 25.73 kg/m²     Physical Exam  Constitutional:       General: He is not in acute distress.     Appearance: Normal appearance. He is well-developed and normal weight. He is not " ill-appearing, toxic-appearing or diaphoretic.   HENT:      Head: Normocephalic and atraumatic.   Eyes:      General: No scleral icterus.     Conjunctiva/sclera: Conjunctivae normal.   Cardiovascular:      Rate and Rhythm: Normal rate.   Pulmonary:      Effort: Pulmonary effort is normal.   Abdominal:      General: Abdomen is flat. There is no distension.      Palpations: There is no mass.      Tenderness: There is no abdominal tenderness. There is no right CVA tenderness, left CVA tenderness, guarding or rebound.      Hernia: No hernia is present.   Musculoskeletal:      Cervical back: Neck supple.   Skin:     General: Skin is warm and dry.   Neurological:      Mental Status: He is alert and oriented to person, place, and time.   Psychiatric:         Behavior: Behavior normal.         Thought Content: Thought content normal.         Judgment: Judgment normal.          Results  Lab Results   Component Value Date    PSA 29.897 (H) 08/15/2024    PSA 22.9 (H) 05/14/2024    PSA 23.87 (H) 08/21/2023     Lab Results   Component Value Date    CALCIUM 9.4 11/25/2024    K 4.5 11/25/2024    CO2 29 11/25/2024     11/25/2024    BUN 24 11/25/2024    CREATININE 1.26 11/25/2024     Lab Results   Component Value Date    WBC 6.27 11/25/2024    HGB 13.8 11/25/2024    HCT 41.7 11/25/2024    MCV 84 11/25/2024     (L) 11/25/2024       Office Urine Dip  No results found for this or any previous visit (from the past hour).]

## 2024-12-30 NOTE — ASSESSMENT & PLAN NOTE
Patient remains on tamsulosin.  His symptom score is 7.  He still gets up frequently at night.  We discussed robotic simple prostatectomy as a means of both improving his voiding symptoms and stabilizing/ improving  his PSA.  An added benefit would be the ability to discontinue tamsulosin.  He was interested in discussing with a robotic surgeon and an appointment will be made.

## 2024-12-30 NOTE — LETTER
2024     Toribio Thomas, DO  [address] [facility] [id number]    Patient: Chel Rea   YOB: 1955   Date of Visit: 2024       Dear Dr. Thomas:    Thank you for referring Chel Rea to me for evaluation. Below are my notes for this consultation.    If you have questions, please do not hesitate to call me. I look forward to following your patient along with you.         Sincerely,        Reyes Early MD        CC: MD Reyes Swain MD  2024  8:41 AM  Sign when Signing Visit  Name: Chel Rea      : 1955      MRN: [id number]  Encounter Provider: Reyes Early MD  Encounter Date: 2024   Encounter department: [facility] [place]  :  Assessment & Plan  Elevated PSA  Biopsies were negative.  He tolerated the procedure well.  Prostate is very large (272 grams) and this is the likely cause of the PSA elevation.       Benign prostatic hyperplasia with urinary obstruction  Patient remains on tamsulosin.  His symptom score is 7.  He still gets up frequently at night.  We discussed robotic simple prostatectomy as a means of both improving his voiding symptoms and stabilizing/ improving  his PSA.  An added benefit would be the ability to discontinue tamsulosin.  He was interested in discussing with a robotic surgeon and an appointment will be made.           History of Present Illness  Chel Rea is a 69 y.o. male who presents for follow-up post transperineal prostate biopsy for an elevated PSA of 29.897.  He tolerated the procedure well.  He notes he is voiding adequately.  There is no fever or dysuria.  No gross hematuria.  He remains on tamsulosin for his voiding symptoms.    Review of Systems   Constitutional: Negative.  Negative for chills, diaphoresis, fatigue and fever.   HENT: Negative.     Eyes: Negative.    Respiratory: Negative.     Cardiovascular: Negative.    Endocrine: Negative.    Genitourinary:       "   See HPI   Musculoskeletal: Negative.    Skin: Negative.    Allergic/Immunologic: Negative.    Neurological: Negative.    Hematological: Negative.    Psychiatric/Behavioral: Negative.             Objective  /78 (BP Location: Right arm, Patient Position: Sitting, Cuff Size: Adult)   Pulse 104   Ht 5' 5\" (1.651 m)   Wt 70.1 kg (154 lb 9.6 oz)   SpO2 99%   BMI 25.73 kg/m²     Physical Exam  Constitutional:       General: He is not in acute distress.     Appearance: Normal appearance. He is well-developed and normal weight. He is not ill-appearing, toxic-appearing or diaphoretic.   HENT:      Head: Normocephalic and atraumatic.   Eyes:      General: No scleral icterus.     Conjunctiva/sclera: Conjunctivae normal.   Cardiovascular:      Rate and Rhythm: Normal rate.   Pulmonary:      Effort: Pulmonary effort is normal.   Abdominal:      General: Abdomen is flat. There is no distension.      Palpations: There is no mass.      Tenderness: There is no abdominal tenderness. There is no right CVA tenderness, left CVA tenderness, guarding or rebound.      Hernia: No hernia is present.   Musculoskeletal:      Cervical back: Neck supple.   Skin:     General: Skin is warm and dry.   Neurological:      Mental Status: He is alert and oriented to person, place, and time.   Psychiatric:         Behavior: Behavior normal.         Thought Content: Thought content normal.         Judgment: Judgment normal.          Results  Lab Results   Component Value Date    PSA 29.897 (H) 08/15/2024    PSA 22.9 (H) 05/14/2024    PSA 23.87 (H) 08/21/2023     Lab Results   Component Value Date    CALCIUM 9.4 11/25/2024    K 4.5 11/25/2024    CO2 29 11/25/2024     11/25/2024    BUN 24 11/25/2024    CREATININE 1.26 11/25/2024     Lab Results   Component Value Date    WBC 6.27 11/25/2024    HGB 13.8 11/25/2024    HCT 41.7 11/25/2024    MCV 84 11/25/2024     (L) 11/25/2024       Office Urine Dip  No results found for this or any " previous visit (from the past hour).]

## 2024-12-30 NOTE — ASSESSMENT & PLAN NOTE
Biopsies were negative.  He tolerated the procedure well.  Prostate is very large (272 grams) and this is the likely cause of the PSA elevation.

## 2025-01-06 ENCOUNTER — TELEPHONE (OUTPATIENT)
Age: 70
End: 2025-01-06

## 2025-01-06 RX ORDER — SODIUM CHLORIDE, SODIUM LACTATE, POTASSIUM CHLORIDE, CALCIUM CHLORIDE 600; 310; 30; 20 MG/100ML; MG/100ML; MG/100ML; MG/100ML
50 INJECTION, SOLUTION INTRAVENOUS CONTINUOUS
Status: CANCELLED | OUTPATIENT
Start: 2025-01-06

## 2025-01-06 NOTE — TELEPHONE ENCOUNTER
Pt call with the prep questions so read it with him but also have the question to take his heart medication he took today said he will feel nausea if will not eat anything. Since his procedure is tomorrow afternoon and if he can eat little bit with his med was transferring the call to Narcisa nurse for the further assistance but he disconnected. Narcisa said she will try to call him back for this.

## 2025-01-06 NOTE — TELEPHONE ENCOUNTER
Patient takes medication for heart transplant.  Needs to take medications in morning but must eat something with them or he gets extremely nauseated and dizzy.  Has colonoscopy at 1245 on 1/7/25.  Please advise.

## 2025-01-07 ENCOUNTER — ANESTHESIA (OUTPATIENT)
Dept: GASTROENTEROLOGY | Facility: HOSPITAL | Age: 70
End: 2025-01-07
Payer: MEDICARE

## 2025-01-07 ENCOUNTER — HOSPITAL ENCOUNTER (OUTPATIENT)
Dept: GASTROENTEROLOGY | Facility: HOSPITAL | Age: 70
Setting detail: OUTPATIENT SURGERY
Discharge: HOME | End: 2025-01-07
Payer: MEDICARE

## 2025-01-07 ENCOUNTER — ANESTHESIA EVENT (OUTPATIENT)
Dept: GASTROENTEROLOGY | Facility: HOSPITAL | Age: 70
End: 2025-01-07
Payer: MEDICARE

## 2025-01-07 VITALS
DIASTOLIC BLOOD PRESSURE: 72 MMHG | HEART RATE: 92 BPM | TEMPERATURE: 98 F | SYSTOLIC BLOOD PRESSURE: 116 MMHG | RESPIRATION RATE: 16 BRPM | OXYGEN SATURATION: 98 %

## 2025-01-07 DIAGNOSIS — Z86.0100 HISTORY OF COLON POLYPS: ICD-10-CM

## 2025-01-07 DIAGNOSIS — K21.00 GASTROESOPHAGEAL REFLUX DISEASE WITH ESOPHAGITIS WITHOUT HEMORRHAGE: ICD-10-CM

## 2025-01-07 PROCEDURE — 88342 IMHCHEM/IMCYTCHM 1ST ANTB: CPT | Performed by: STUDENT IN AN ORGANIZED HEALTH CARE EDUCATION/TRAINING PROGRAM

## 2025-01-07 PROCEDURE — 45385 COLONOSCOPY W/LESION REMOVAL: CPT | Performed by: STUDENT IN AN ORGANIZED HEALTH CARE EDUCATION/TRAINING PROGRAM

## 2025-01-07 PROCEDURE — 43239 EGD BIOPSY SINGLE/MULTIPLE: CPT | Performed by: STUDENT IN AN ORGANIZED HEALTH CARE EDUCATION/TRAINING PROGRAM

## 2025-01-07 PROCEDURE — 88305 TISSUE EXAM BY PATHOLOGIST: CPT | Performed by: STUDENT IN AN ORGANIZED HEALTH CARE EDUCATION/TRAINING PROGRAM

## 2025-01-07 RX ORDER — SODIUM CHLORIDE, SODIUM LACTATE, POTASSIUM CHLORIDE, CALCIUM CHLORIDE 600; 310; 30; 20 MG/100ML; MG/100ML; MG/100ML; MG/100ML
INJECTION, SOLUTION INTRAVENOUS CONTINUOUS PRN
Status: DISCONTINUED | OUTPATIENT
Start: 2025-01-07 | End: 2025-01-07

## 2025-01-07 RX ORDER — SODIUM CHLORIDE, SODIUM LACTATE, POTASSIUM CHLORIDE, CALCIUM CHLORIDE 600; 310; 30; 20 MG/100ML; MG/100ML; MG/100ML; MG/100ML
125 INJECTION, SOLUTION INTRAVENOUS CONTINUOUS
Status: DISCONTINUED | OUTPATIENT
Start: 2025-01-07 | End: 2025-01-11 | Stop reason: HOSPADM

## 2025-01-07 RX ORDER — OMEPRAZOLE 40 MG/1
40 CAPSULE, DELAYED RELEASE ORAL DAILY
Qty: 30 CAPSULE | Refills: 2 | Status: SHIPPED | OUTPATIENT
Start: 2025-01-07

## 2025-01-07 RX ORDER — LIDOCAINE HYDROCHLORIDE 20 MG/ML
INJECTION, SOLUTION EPIDURAL; INFILTRATION; INTRACAUDAL; PERINEURAL AS NEEDED
Status: DISCONTINUED | OUTPATIENT
Start: 2025-01-07 | End: 2025-01-07

## 2025-01-07 RX ORDER — PROPOFOL 10 MG/ML
INJECTION, EMULSION INTRAVENOUS AS NEEDED
Status: DISCONTINUED | OUTPATIENT
Start: 2025-01-07 | End: 2025-01-07

## 2025-01-07 RX ADMIN — PROPOFOL 100 MG: 10 INJECTION, EMULSION INTRAVENOUS at 14:09

## 2025-01-07 RX ADMIN — PROPOFOL 50 MG: 10 INJECTION, EMULSION INTRAVENOUS at 14:15

## 2025-01-07 RX ADMIN — PROPOFOL 50 MG: 10 INJECTION, EMULSION INTRAVENOUS at 14:10

## 2025-01-07 RX ADMIN — PROPOFOL 50 MG: 10 INJECTION, EMULSION INTRAVENOUS at 14:24

## 2025-01-07 RX ADMIN — PROPOFOL 50 MG: 10 INJECTION, EMULSION INTRAVENOUS at 14:21

## 2025-01-07 RX ADMIN — SODIUM CHLORIDE, SODIUM LACTATE, POTASSIUM CHLORIDE, AND CALCIUM CHLORIDE: .6; .31; .03; .02 INJECTION, SOLUTION INTRAVENOUS at 13:00

## 2025-01-07 RX ADMIN — SODIUM CHLORIDE, SODIUM LACTATE, POTASSIUM CHLORIDE, AND CALCIUM CHLORIDE 125 ML/HR: .6; .31; .03; .02 INJECTION, SOLUTION INTRAVENOUS at 12:30

## 2025-01-07 RX ADMIN — PROPOFOL 50 MG: 10 INJECTION, EMULSION INTRAVENOUS at 14:13

## 2025-01-07 RX ADMIN — LIDOCAINE HYDROCHLORIDE 100 MG: 20 INJECTION, SOLUTION EPIDURAL; INFILTRATION; INTRACAUDAL at 14:07

## 2025-01-07 RX ADMIN — PROPOFOL 50 MG: 10 INJECTION, EMULSION INTRAVENOUS at 14:18

## 2025-01-07 RX ADMIN — PROPOFOL 30 MG: 10 INJECTION, EMULSION INTRAVENOUS at 14:30

## 2025-01-07 NOTE — ANESTHESIA PREPROCEDURE EVALUATION
Procedure:  COLONOSCOPY  EGD    Relevant Problems   ANESTHESIA (within normal limits)      CARDIO   (+) Combined arterial insufficiency and corporo-venous occlusive erectile dysfunction   (+) Hyperlipidemia LDL goal <100   (+) Hypertension      GI/HEPATIC   (+) GERD (gastroesophageal reflux disease)      /RENAL   (+) Benign prostatic hyperplasia with urinary obstruction   (+) Stage 3a chronic kidney disease (HCC)      HEMATOLOGY   (+) Thrombocytopenia (HCC)        Physical Exam    Airway    Mallampati score: II  TM Distance: >3 FB  Neck ROM: full     Dental       Cardiovascular  Rate: normal    Pulmonary  Pulmonary exam normal     Other Findings  Per pt denies anything remaining that is loose or removeable              I was present during the key portion of the procedure.

## 2025-01-07 NOTE — ANESTHESIA POSTPROCEDURE EVALUATION
Post-Op Assessment Note    CV Status:  Stable  Pain Score: 0    Pain management: adequate       Mental Status:  Alert   Hydration Status:  Stable   PONV Controlled:  Controlled   Airway Patency:  Patent     Post Op Vitals Reviewed: Yes    No anethesia notable event occurred.    Staff: CRNA           Last Filed PACU Vitals:  Vitals Value Taken Time   Temp     Pulse 97    BP 97/62    Resp 20    SpO2 98

## 2025-01-07 NOTE — ANESTHESIA PREPROCEDURE EVALUATION
Procedure:  COLONOSCOPY  EGD    Relevant Problems   CARDIO   (+) Combined arterial insufficiency and corporo-venous occlusive erectile dysfunction   (+) Hyperlipidemia LDL goal <100   (+) Hypertension      GI/HEPATIC   (+) GERD (gastroesophageal reflux disease)      /RENAL   (+) Benign prostatic hyperplasia with urinary obstruction   (+) Stage 3a chronic kidney disease (HCC)      HEMATOLOGY   (+) Thrombocytopenia (HCC)        Physical Exam    Airway    Mallampati score: II  TM Distance: <3 FB  Neck ROM: full     Dental   Comment: Top partial dentures upper dentures    Cardiovascular  Cardiovascular exam normal    Pulmonary  Pulmonary exam normal     Other Findings        Anesthesia Plan  ASA Score- 2     Anesthesia Type- IV sedation with anesthesia with ASA Monitors.         Additional Monitors:     Airway Plan:            Plan Factors-Exercise tolerance (METS): >4 METS.    Chart reviewed. EKG reviewed.  Existing labs reviewed.     Patient is not a current smoker. Patient not instructed to abstain from smoking on day of procedure. Patient did not smoke on day of surgery.    Obstructive sleep apnea risk education given perioperatively.There is medical exclusion for perioperative obstructive sleep apnea risk education.        Induction- intravenous.    Postoperative Plan-         Informed Consent- Anesthetic plan and risks discussed with patient.  I personally reviewed this patient with the CRNA. Discussed and agreed on the Anesthesia Plan with the CRNA..

## 2025-01-07 NOTE — H&P
History and Physical - [facility]  Chel Rea 69 y.o. male MRN: [id number]          HPI: Chel Rea is a 69 y.o. year old male w/ hx of heart transplant 2017 on MMF/tacro who presents for EGD/colonoscopy to evaluate GERD and prior advanced adenoma.    Last colonoscopy  showed a 24 mm TVA in the ascending colon and a small TA.      REVIEW OF SYSTEMS: Per the HPI, and otherwise unremarkable.    Historical Information   Past Medical History:   Diagnosis Date    Benign prostatic hyperplasia     transperineal bx of prostate  today 2024    Colon polyp     GERD (gastroesophageal reflux disease)     HL (hearing loss)     Hyperlipidemia     Hypertension     PE (pulmonary thromboembolism) (HCC)     Shortness of breath     walking up a hill    UTI (urinary tract infection)     Wears partial dentures     upper     Past Surgical History:   Procedure Laterality Date    CARDIAC SURGERY  2017    Heart transplant    COLONOSCOPY      ID PROSTATE NEEDLE BIOPSY ANY APPROACH N/A 2024    Procedure: TRANSPERINEAL ULTRASOUND GUIDED BX PROSTATE;  Surgeon: Nima Wolfe MD;  Location: [facility];  Service: Urology    ID RPR 1ST INGUN HRNA AGE 5 YRS/> REDUCIBLE Left 2024    Procedure: OPEN LEFT INGUINAL HERNIA REPAIR WITH MESH;  Surgeon: Janet Chavez MD;  Location:  MAIN OR;  Service: General    TONSILLECTOMY       Social History   Social History     Substance and Sexual Activity   Alcohol Use Yes    Alcohol/week: 1.0 standard drink of alcohol    Types: 1 Standard drinks or equivalent per week    Comment: socially     Social History     Substance and Sexual Activity   Drug Use Never     Social History     Tobacco Use   Smoking Status Former    Current packs/day: 0.00    Average packs/day: 2.5 packs/day for 46.0 years (115.0 ttl pk-yrs)    Types: Cigarettes    Start date:     Quit date: 2016    Years since quittin.0   Smokeless Tobacco Never     Family History    Problem Relation Age of Onset    Hypertension Father     Diabetes Mother        Meds/Allergies       Current Outpatient Medications:     amLODIPine (NORVASC) 10 mg tablet    aspirin (ECOTRIN LOW STRENGTH) 81 mg EC tablet    famotidine (PEPCID) 20 mg tablet    fluocinonide (LIDEX) 0.05 % cream    fluorouracil (EFUDEX) 5 % cream    loratadine (CLARITIN) 10 mg tablet    losartan (COZAAR) 100 MG tablet    mirtazapine (REMERON) 15 mg tablet    mycophenolate (CELLCEPT) 250 mg capsule    pravastatin (PRAVACHOL) 40 mg tablet    tacrolimus (PROGRAF) 0.5 mg capsule    tacrolimus (PROGRAF) 1 mg capsule    tamsulosin (FLOMAX) 0.4 mg    polyethylene glycol (Golytely) 4000 mL solution    tadalafil (CIALIS) 5 MG tablet    Current Facility-Administered Medications:     lactated ringers infusion, 125 mL/hr, Intravenous, Continuous, 125 mL/hr at 01/07/25 1230    Facility-Administered Medications Ordered in Other Encounters:     lactated ringers infusion, , Intravenous, Continuous PRN, New Bag at 01/07/25 1300    Allergies   Allergen Reactions    Oxycodone-Acetaminophen Hives     Other reaction(s): rash    Codeine Hives     URTICARIA   Other reaction(s): rash and hives       Objective     /88   Pulse 104   Temp (!) 97.4 °F (36.3 °C) (Temporal)   Resp 16   SpO2 97%       PHYSICAL EXAM    GEN: NAD  CARDIO: RRR  PULM: CTA bilaterally  ABD: soft, non-tender, non-distended  EXT: no lower extremity edema  NEURO: AAOx3      ASSESSMENT/PLAN:  69 y.o. year old male here for EGD/colonoscopy; he is stable and optimized for his procedure.

## 2025-01-08 ENCOUNTER — TELEPHONE (OUTPATIENT)
Dept: GASTROENTEROLOGY | Facility: MEDICAL CENTER | Age: 70
End: 2025-01-08

## 2025-01-08 ENCOUNTER — PREP FOR PROCEDURE (OUTPATIENT)
Dept: GASTROENTEROLOGY | Facility: MEDICAL CENTER | Age: 70
End: 2025-01-08

## 2025-01-08 DIAGNOSIS — K21.9 GASTROESOPHAGEAL REFLUX DISEASE WITHOUT ESOPHAGITIS: Primary | ICD-10-CM

## 2025-01-08 NOTE — TELEPHONE ENCOUNTER
Called and spoke to patient to schedule procedure EGD, patient has scheduled and would like instructions sent via email.         EGD 3 months  Received: Yesterday  Wiley Cantu MD  [facility]  Hi,    Can this patient have a repeat EGD in 3 months for esophagitis?    Thanks,  Edwar

## 2025-01-10 NOTE — PROGRESS NOTES
Ambulatory Visit  Name: Chel Rea      : 1955      MRN: [id number]  Encounter Provider: Nima Wolfe MD  Encounter Date: 2025   Encounter department: [facility] [place] [place]    Assessment & Plan  BPH with elevated PSA and lower urinary tract symptoms  We had an extensive discussion regarding the various treatment options for benign prostatic hyperplasia (BPH).      We discussed the role of watchful waiting and lifestyle modifications (decrease caffeine, PM fluids, alcohol) to help with the symptoms of BPH. The risk of monitoring symptoms overtime include bladder decompensation and kidney injury, which may require long term indwelling Reyes catheter, which I explained would mean a tube in the bladder to keep his urinary tract unobstructed.  I stressed the importance of this during our discussion, but also explained that his symptoms may progress slowly.    We discussed medical management with alpha blockers and/or 5-alpha reductase inhibitors including side effects of these medications.  Patients may experience a mild to moderate improvement (results vary) in lower urinary tract symptoms based on IPSS scores, with combination pharmacotherapy being the most effective. Cialis, a medication typically used for erectile dysfunction can be used by itself or in combination with these other therapies and has shown efficacy as well.    Bladder outlet procedures that are considered definitive include TURP (the historical Gold standard)/TUIP, laser treatment (HOLEP, ThuLEP), and robotic simple prostatectomy. I explained the prognosis and possible complications of each surgery. We discussed the difference between enucleation vs ablative/resection procedures and that the latter is associated with increased re-treatment rates due to prostate regrowth.    For men with prostates >80mL, bladder outlet procedures considered to be definitive include laser enucleation of the prostate and robotic  simple prostatectomy. Laser enucleation of the prostate offers a completely endoscopic approach (no incisions) to definitively remove prostate tissue obstructing the bladder with lower risk of bleeding complications compared to robotic simple prostatectomy. There is, however a significant risk of transient stress urinary incontinence with laser enucleation that is not seen in robotic simple prostatectomy. We discussed the pros and cons of these two approaches and that I typically prefer to perform robotic prostatectomy in glands approaching 200cc and larger is size although laser enucleation is an option for these as well.    He has a significantly elevated PSA that has resulted in many prior prostate biopsies which have been negative for cancer. He has lower urinary tract symptoms which are reasonable well controlled on flomax but he is interested in getting off medication if possible. He also continues to have urgency, urge incontinence, and difficulty initiating stream.     In this context, I do believe it quite reasonable to proceed with surgery to remove his prostate adenoma to relieve his urinary symptoms, protect his bladder, get him off medication, and to flatten his PSA making screening more reliable for him. I also explained that he has radiographic evidence of bladder damage from high pressure voiding in the form of significant bladder thickening and trabeculations. We reviewed his MRI imaging together.    We discussed pre and post-operative expectations of robotic prostatectomy and HoLEP including risks, benefits, and alternatives.     I explained that this would not preclude him from future prostatectomy or radiation in the future if he were found to have prostate cancer.     - He is interested in robotic simple prostatectomy but would like some more time to consider it and discuss with his children. He is most concerned about having major surgery to begin with, with having a catheter, and with  post-operative urinary leakage  - He does have a history of heart transplant and I did explain that he would be at elevated but acceptable risk of infection and wound healing complications because of his immunosuppression  - Follow up 2 months to discuss and schedule for surgery if he remains interested           History of Present Illness     Chel Rea is a 69 y.o. male who is referred for evaluation of BPH with elevated PSA. He has a longstanding elevated PSA over 20 and has had prostate biopsies before most recently a transperineal saturation biopsy that I performed on 12/11/24 which showed no evidence of cancer.     He takes flomax 0.4 mg for lower urinary tract symptoms. Symptom score 7 on Flomax. He does have interest in getting off medication. He continues to have urgency, urge incontinence, and urinary frequency. He does have weak stream as well.    Dr. Early is his primary urologist and referred him to consider BPH treatment both to get him off medication and to flatten his PSA so that PSA screening becomes more easily interpretable for him.     Long history of smoking. Quit in 2017 when he was found to have heart failure now s/p successful heart transplant at that time.     History obtained from : patient        Objective     There were no vitals taken for this visit.  Physical Exam  There were no vitals filed for this visit.   General: Well appearing, no acute distress   HEENT: normocephalic, atraumatic  Eyes: extraocular movements intact  Cardiovascular: normal rate   Respiratory: no respiratory distress, effort normal   Abdominal: Non-distended, non-tender   : Deferred  MSK: Grossly normal range of motion   Neurological: No gross focal deficits  Behavioral: Normal mood and affect     Results  Lab Results   Component Value Date    PSA 29.897 (H) 08/15/2024    PSA 22.9 (H) 05/14/2024    PSA 23.87 (H) 08/21/2023     Lab Results   Component Value Date    CALCIUM 9.4 11/25/2024    K 4.5 11/25/2024     CO2 29 11/25/2024     11/25/2024    BUN 24 11/25/2024    CREATININE 1.26 11/25/2024     Lab Results   Component Value Date    WBC 6.27 11/25/2024    HGB 13.8 11/25/2024    HCT 41.7 11/25/2024    MCV 84 11/25/2024     (L) 11/25/2024       Imaging  I have personally reviewed pertinent films in PACS and my interpretation is 272 cc prostate. Significantly thickened bladder with trabeculations noted. No significant median lobe.    MRI PROSTATE MULTIPARAMETRIC WO W CONTRAST  1. PI-RADSv2.1 Category 2 - Low (clinically significant cancer is unlikely to be present).    2. No extraprostatic tumor, seminal vesicle invasion, pelvic lymphadenopathy, or pelvic osseous metastatic disease.    3. Calculated prostate volume of 271.7 cc.    Prostate gland boundaries were segmented using 3D advanced post-processing on an independent [employer] system workstation with active physician participation.    Workstation performed: [id number]      Office Urine Dip  No results found for this or any previous visit (from the past hour).]    Administrative Statements     I spent over 45 minutes preparing for and engaging in consultation with this patient including personal review of imaging, all available outside records, and counseling regarding prognosis, treatment options, coordination of care, and planned course of treatment.

## 2025-01-13 PROCEDURE — 88342 IMHCHEM/IMCYTCHM 1ST ANTB: CPT | Performed by: STUDENT IN AN ORGANIZED HEALTH CARE EDUCATION/TRAINING PROGRAM

## 2025-01-13 PROCEDURE — 88305 TISSUE EXAM BY PATHOLOGIST: CPT | Performed by: STUDENT IN AN ORGANIZED HEALTH CARE EDUCATION/TRAINING PROGRAM

## 2025-01-14 ENCOUNTER — OFFICE VISIT (OUTPATIENT)
Dept: UROLOGY | Facility: CLINIC | Age: 70
End: 2025-01-14
Payer: MEDICARE

## 2025-01-14 VITALS
OXYGEN SATURATION: 99 % | SYSTOLIC BLOOD PRESSURE: 146 MMHG | HEIGHT: 65 IN | HEART RATE: 101 BPM | DIASTOLIC BLOOD PRESSURE: 82 MMHG | WEIGHT: 152 LBS | BODY MASS INDEX: 25.33 KG/M2

## 2025-01-14 DIAGNOSIS — N40.1 BPH WITH ELEVATED PSA AND LOWER URINARY TRACT SYMPTOMS: Primary | ICD-10-CM

## 2025-01-14 DIAGNOSIS — R97.20 BPH WITH ELEVATED PSA AND LOWER URINARY TRACT SYMPTOMS: Primary | ICD-10-CM

## 2025-01-14 PROCEDURE — 99215 OFFICE O/P EST HI 40 MIN: CPT

## 2025-01-14 NOTE — LETTER
2025     Reyes Early MD  [address]  [address]  [place] [address]    Patient: Chel Rea   YOB: 1955   Date of Visit: 2025       Dear Dr. Early:    Thank you for referring Chel Rea to me for evaluation. Below are my notes for this consultation.    If you have questions, please do not hesitate to call me. I look forward to following your patient along with you.         Sincerely,        Nima Wolfe MD        CC: No Recipients    Nima Wolfe MD  2025  8:10 AM  Sign when Signing Visit  Ambulatory Visit  Name: Chel Rea      : 1955      MRN: [id number]  Encounter Provider: Nima Wolfe MD  Encounter Date: 2025   Encounter department: [facility] UROLOGY [place]    Assessment & Plan  BPH with elevated PSA and lower urinary tract symptoms  We had an extensive discussion regarding the various treatment options for benign prostatic hyperplasia (BPH).      We discussed the role of watchful waiting and lifestyle modifications (decrease caffeine, PM fluids, alcohol) to help with the symptoms of BPH. The risk of monitoring symptoms overtime include bladder decompensation and kidney injury, which may require long term indwelling Reyes catheter, which I explained would mean a tube in the bladder to keep his urinary tract unobstructed.  I stressed the importance of this during our discussion, but also explained that his symptoms may progress slowly.    We discussed medical management with alpha blockers and/or 5-alpha reductase inhibitors including side effects of these medications.  Patients may experience a mild to moderate improvement (results vary) in lower urinary tract symptoms based on IPSS scores, with combination pharmacotherapy being the most effective. Cialis, a medication typically used for erectile dysfunction can be used by itself or in combination with these other therapies and has shown efficacy as  well.    Bladder outlet procedures that are considered definitive include TURP (the historical Gold standard)/TUIP, laser treatment (HOLEP, ThuLEP), and robotic simple prostatectomy. I explained the prognosis and possible complications of each surgery. We discussed the difference between enucleation vs ablative/resection procedures and that the latter is associated with increased re-treatment rates due to prostate regrowth.    For men with prostates >80mL, bladder outlet procedures considered to be definitive include laser enucleation of the prostate and robotic simple prostatectomy. Laser enucleation of the prostate offers a completely endoscopic approach (no incisions) to definitively remove prostate tissue obstructing the bladder with lower risk of bleeding complications compared to robotic simple prostatectomy. There is, however a significant risk of transient stress urinary incontinence with laser enucleation that is not seen in robotic simple prostatectomy. We discussed the pros and cons of these two approaches and that I typically prefer to perform robotic prostatectomy in glands approaching 200cc and larger is size although laser enucleation is an option for these as well.    He has a significantly elevated PSA that has resulted in many prior prostate biopsies which have been negative for cancer. He has lower urinary tract symptoms which are reasonable well controlled on flomax but he is interested in getting off medication if possible. He also continues to have urgency, urge incontinence, and difficulty initiating stream.     In this context, I do believe it quite reasonable to proceed with surgery to remove his prostate adenoma to relieve his urinary symptoms, protect his bladder, get him off medication, and to flatten his PSA making screening more reliable for him. I also explained that he has radiographic evidence of bladder damage from high pressure voiding in the form of significant bladder thickening  and trabeculations. We reviewed his MRI imaging together.    We discussed pre and post-operative expectations of robotic prostatectomy and HoLEP including risks, benefits, and alternatives.     I explained that this would not preclude him from future prostatectomy or radiation in the future if he were found to have prostate cancer.     - He is interested in robotic simple prostatectomy but would like some more time to consider it and discuss with his children. He is most concerned about having major surgery to begin with, with having a catheter, and with post-operative urinary leakage  - He does have a history of heart transplant and I did explain that he would be at elevated but acceptable risk of infection and wound healing complications because of his immunosuppression  - Follow up 2 months to discuss and schedule for surgery if he remains interested           History of Present Illness     Chel Rea is a 69 y.o. male who is referred for evaluation of BPH with elevated PSA. He has a longstanding elevated PSA over 20 and has had prostate biopsies before most recently a transperineal saturation biopsy that I performed on 12/11/24 which showed no evidence of cancer.     He takes flomax 0.4 mg for lower urinary tract symptoms. Symptom score 7 on Flomax. He does have interest in getting off medication. He continues to have urgency, urge incontinence, and urinary frequency. He does have weak stream as well.    Dr. Early is his primary urologist and referred him to consider BPH treatment both to get him off medication and to flatten his PSA so that PSA screening becomes more easily interpretable for him.     Long history of smoking. Quit in 2017 when he was found to have heart failure now s/p successful heart transplant at that time.     History obtained from : patient        Objective     There were no vitals taken for this visit.  Physical Exam  There were no vitals filed for this visit.   General: Well  appearing, no acute distress   HEENT: normocephalic, atraumatic  Eyes: extraocular movements intact  Cardiovascular: normal rate   Respiratory: no respiratory distress, effort normal   Abdominal: Non-distended, non-tender   : Deferred  MSK: Grossly normal range of motion   Neurological: No gross focal deficits  Behavioral: Normal mood and affect     Results  Lab Results   Component Value Date    PSA 29.897 (H) 08/15/2024    PSA 22.9 (H) 05/14/2024    PSA 23.87 (H) 08/21/2023     Lab Results   Component Value Date    CALCIUM 9.4 11/25/2024    K 4.5 11/25/2024    CO2 29 11/25/2024     11/25/2024    BUN 24 11/25/2024    CREATININE 1.26 11/25/2024     Lab Results   Component Value Date    WBC 6.27 11/25/2024    HGB 13.8 11/25/2024    HCT 41.7 11/25/2024    MCV 84 11/25/2024     (L) 11/25/2024       Imaging  I have personally reviewed pertinent films in PACS and my interpretation is 272 cc prostate. Significantly thickened bladder with trabeculations noted. No significant median lobe.    MRI PROSTATE MULTIPARAMETRIC WO W CONTRAST  1. PI-RADSv2.1 Category 2 - Low (clinically significant cancer is unlikely to be present).    2. No extraprostatic tumor, seminal vesicle invasion, pelvic lymphadenopathy, or pelvic osseous metastatic disease.    3. Calculated prostate volume of 271.7 cc.    Prostate gland boundaries were segmented using 3D advanced post-processing on an independent [employer] system workstation with active physician participation.    Workstation performed: [id number]      Office Urine Dip  No results found for this or any previous visit (from the past hour).]    Administrative Statements     I spent over 45 minutes preparing for and engaging in consultation with this patient including personal review of imaging, all available outside records, and counseling regarding prognosis, treatment options, coordination of care, and planned course of treatment.

## 2025-01-16 ENCOUNTER — TELEPHONE (OUTPATIENT)
Age: 70
End: 2025-01-16

## 2025-01-16 NOTE — TELEPHONE ENCOUNTER
Please have patient take advil for the next few days; ice the area; vaseline  This is an expected reaction  He should come follow up in 2 months - ok to put on mine or Veena's schedule

## 2025-01-16 NOTE — TELEPHONE ENCOUNTER
Patient called to let Dr. Hugo to know that yesterday he finished the  6 weeks course of effudex and the are treated on left upper arm is painful , red and yellowish . Denied fever or chills .   Patient is unable to send pictures via [employer] .   92

## 2025-02-05 ENCOUNTER — RESULTS FOLLOW-UP (OUTPATIENT)
Dept: GASTROENTEROLOGY | Facility: MEDICAL CENTER | Age: 70
End: 2025-02-05

## 2025-02-21 NOTE — TELEPHONE ENCOUNTER
----- Message from Wiley Cantu MD sent at 2/13/2025 12:33 PM EST -----  Patient has not read the result message I sent via [employer] 1 week ago. I will ask GI staff to call to update the patient. Recommend reading the [employer] message to the patient - not the result note.

## 2025-02-22 ENCOUNTER — APPOINTMENT (OUTPATIENT)
Dept: LAB | Facility: HOSPITAL | Age: 70
End: 2025-02-22
Payer: MEDICARE

## 2025-02-28 ENCOUNTER — OFFICE VISIT (OUTPATIENT)
Dept: UROLOGY | Facility: MEDICAL CENTER | Age: 70
End: 2025-02-28
Payer: MEDICARE

## 2025-02-28 VITALS
WEIGHT: 155 LBS | BODY MASS INDEX: 25.83 KG/M2 | HEART RATE: 106 BPM | SYSTOLIC BLOOD PRESSURE: 122 MMHG | DIASTOLIC BLOOD PRESSURE: 80 MMHG | OXYGEN SATURATION: 98 % | HEIGHT: 65 IN

## 2025-02-28 DIAGNOSIS — D03.59 MELANOMA IN SITU OF OTHER PART OF TRUNK (HCC): ICD-10-CM

## 2025-02-28 DIAGNOSIS — Z94.1 HISTORY OF HEART TRANSPLANT (HCC): ICD-10-CM

## 2025-02-28 DIAGNOSIS — N40.1 BENIGN PROSTATIC HYPERPLASIA WITH URINARY OBSTRUCTION: ICD-10-CM

## 2025-02-28 DIAGNOSIS — R97.20 ELEVATED PSA: Primary | ICD-10-CM

## 2025-02-28 DIAGNOSIS — N13.8 BENIGN PROSTATIC HYPERPLASIA WITH URINARY OBSTRUCTION: ICD-10-CM

## 2025-02-28 DIAGNOSIS — N18.31 STAGE 3A CHRONIC KIDNEY DISEASE (HCC): ICD-10-CM

## 2025-02-28 PROCEDURE — 99214 OFFICE O/P EST MOD 30 MIN: CPT | Performed by: UROLOGY

## 2025-02-28 NOTE — LETTER
2025     Toribio Thomas DO  [address] [facility] [address]    Patient: Chel Rea   YOB: 1955   Date of Visit: 2025       Dear Dr. Thomas:    Thank you for referring Chel Rea to me for evaluation. Below are my notes for this consultation.    If you have questions, please do not hesitate to call me. I look forward to following your patient along with you.         Sincerely,        Reyes Early MD        CC: MD Reyes Swain MD  2025  9:52 AM  Sign when Signing Visit  Name: Chel Rea      : 1955      MRN: [id number]  Encounter Provider: Reyes Early MD  Encounter Date: 2025   Encounter department: [facility] [place]  :  Assessment & Plan  Elevated PSA  PSA continues to rise and is presently 30.647 (2025).  On multiparametric MRI done in 2023 his prostate was calculated to be over 270 g.  PSA density remains low at 11%.  The MRI was a PI-RADS 2 MRI.  Biopsies in 2024 were all negative.  The patient notes he will be having a robotic simple prostatectomy in the future.  We will plan to recheck his PSA postoperatively.  Orders:  •  PSA, total and free; Future    Benign prostatic hyperplasia with urinary obstruction  He remains satisfied with his voiding pattern on Flomax.  He notes he has elected to proceed with robotic simple prostatectomy       Melanoma in situ of other part of trunk (HCC)         Stage 3a chronic kidney disease (HCC)  Lab Results   Component Value Date    EGFR 57 2024    EGFR 59 2024    EGFR 48 2023    CREATININE 1.26 2024    CREATININE 1.23 2024    CREATININE 1.45 (H) 2023            History of heart transplant (HCC)             History of Present Illness  Chel Rea is a 69 y.o. male who presents for follow-up his low urinary tract symptoms and elevated PSA.  He reports he is generally doing well as far as  "voiding is concerned.  He remains on tamsulosin.  His stream is adequate however he does void frequently.  He denies gross hematuria, dysuria or symptoms of infection at this time.  He has seen Dr. Wolfe regarding robotic simple prostatectomy and he notes he will likely proceed with this in the future.    Review of Systems   Constitutional:  Negative for chills, diaphoresis, fatigue and fever.   HENT: Negative.     Eyes: Negative.    Respiratory: Negative.     Cardiovascular: Negative.    Endocrine: Negative.    Genitourinary:         See HPI   Musculoskeletal: Negative.    Skin: Negative.    Allergic/Immunologic: Negative.    Neurological: Negative.    Hematological: Negative.    Psychiatric/Behavioral: Negative.            Objective  /80   Pulse (!) 106   Ht 5' 5\" (1.651 m)   Wt 70.3 kg (155 lb)   SpO2 98%   BMI 25.79 kg/m²     Physical Exam  Constitutional:       General: He is not in acute distress.     Appearance: Normal appearance. He is well-developed and normal weight. He is not ill-appearing, toxic-appearing or diaphoretic.   HENT:      Head: Normocephalic and atraumatic.   Eyes:      General: No scleral icterus.     Conjunctiva/sclera: Conjunctivae normal.   Cardiovascular:      Rate and Rhythm: Normal rate.   Pulmonary:      Effort: Pulmonary effort is normal.   Abdominal:      General: Abdomen is flat. There is no distension.      Palpations: There is no mass.      Tenderness: There is no abdominal tenderness. There is no right CVA tenderness, left CVA tenderness, guarding or rebound.      Hernia: No hernia is present.   Genitourinary:     Penis: Normal.       Testes: Normal.   Musculoskeletal:      Cervical back: Neck supple.   Skin:     General: Skin is warm and dry.   Neurological:      General: No focal deficit present.      Mental Status: He is alert and oriented to person, place, and time.   Psychiatric:         Mood and Affect: Mood normal.         Behavior: Behavior normal.         " Thought Content: Thought content normal.         Judgment: Judgment normal.        Results   Lab Results   Component Value Date    PSA 30.647 (H) 02/22/2025    PSA 29.897 (H) 08/15/2024    PSA 22.9 (H) 05/14/2024     Lab Results   Component Value Date    CALCIUM 9.4 11/25/2024    K 4.5 11/25/2024    CO2 29 11/25/2024     11/25/2024    BUN 24 11/25/2024    CREATININE 1.26 11/25/2024     Lab Results   Component Value Date    WBC 6.27 11/25/2024    HGB 13.8 11/25/2024    HCT 41.7 11/25/2024    MCV 84 11/25/2024     (L) 11/25/2024       Office Urine Dip  No results found for this or any previous visit (from the past hour).

## 2025-02-28 NOTE — ASSESSMENT & PLAN NOTE
Lab Results   Component Value Date    EGFR 57 11/25/2024    EGFR 59 01/17/2024    EGFR 48 12/01/2023    CREATININE 1.26 11/25/2024    CREATININE 1.23 01/17/2024    CREATININE 1.45 (H) 12/01/2023

## 2025-02-28 NOTE — ASSESSMENT & PLAN NOTE
He remains satisfied with his voiding pattern on Flomax.  He notes he has elected to proceed with robotic simple prostatectomy

## 2025-02-28 NOTE — ASSESSMENT & PLAN NOTE
PSA continues to rise and is presently 30.647 (February 22, 2025).  On multiparametric MRI done in November 2023 his prostate was calculated to be over 270 g.  PSA density remains low at 11%.  The MRI was a PI-RADS 2 MRI.  Biopsies in December 2024 were all negative.  The patient notes he will be having a robotic simple prostatectomy in the future.  We will plan to recheck his PSA postoperatively.  Orders:    PSA, total and free; Future

## 2025-02-28 NOTE — PROGRESS NOTES
Name: Chel Rea      : 1955      MRN: [id number]  Encounter Provider: Reyes Early MD  Encounter Date: 2025   Encounter [facility]  :  Assessment & Plan  Elevated PSA  PSA continues to rise and is presently 30.647 (2025).  On multiparametric MRI done in 2023 his prostate was calculated to be over 270 g.  PSA density remains low at 11%.  The MRI was a PI-RADS 2 MRI.  Biopsies in 2024 were all negative.  The patient notes he will be having a robotic simple prostatectomy in the future.  We will plan to recheck his PSA postoperatively.  Orders:    PSA, total and free; Future    Benign prostatic hyperplasia with urinary obstruction  He remains satisfied with his voiding pattern on Flomax.  He notes he has elected to proceed with robotic simple prostatectomy       Melanoma in situ of other part of trunk (HCC)         Stage 3a chronic kidney disease (HCC)  Lab Results   Component Value Date    EGFR 57 2024    EGFR 59 2024    EGFR 48 2023    CREATININE 1.26 2024    CREATININE 1.23 2024    CREATININE 1.45 (H) 2023            History of heart transplant (HCC)             History of Present Illness   Chel Rea is a 69 y.o. male who presents for follow-up his low urinary tract symptoms and elevated PSA.  He reports he is generally doing well as far as voiding is concerned.  He remains on tamsulosin.  His stream is adequate however he does void frequently.  He denies gross hematuria, dysuria or symptoms of infection at this time.  He has seen Dr. Wolfe regarding robotic simple prostatectomy and he notes he will likely proceed with this in the future.    Review of Systems   Constitutional:  Negative for chills, diaphoresis, fatigue and fever.   HENT: Negative.     Eyes: Negative.    Respiratory: Negative.     Cardiovascular: Negative.    Endocrine: Negative.    Genitourinary:         See HPI  "  Musculoskeletal: Negative.    Skin: Negative.    Allergic/Immunologic: Negative.    Neurological: Negative.    Hematological: Negative.    Psychiatric/Behavioral: Negative.            Objective   /80   Pulse (!) 106   Ht 5' 5\" (1.651 m)   Wt 70.3 kg (155 lb)   SpO2 98%   BMI 25.79 kg/m²     Physical Exam  Constitutional:       General: He is not in acute distress.     Appearance: Normal appearance. He is well-developed and normal weight. He is not ill-appearing, toxic-appearing or diaphoretic.   HENT:      Head: Normocephalic and atraumatic.   Eyes:      General: No scleral icterus.     Conjunctiva/sclera: Conjunctivae normal.   Cardiovascular:      Rate and Rhythm: Normal rate.   Pulmonary:      Effort: Pulmonary effort is normal.   Abdominal:      General: Abdomen is flat. There is no distension.      Palpations: There is no mass.      Tenderness: There is no abdominal tenderness. There is no right CVA tenderness, left CVA tenderness, guarding or rebound.      Hernia: No hernia is present.   Genitourinary:     Penis: Normal.       Testes: Normal.   Musculoskeletal:      Cervical back: Neck supple.   Skin:     General: Skin is warm and dry.   Neurological:      General: No focal deficit present.      Mental Status: He is alert and oriented to person, place, and time.   Psychiatric:         Mood and Affect: Mood normal.         Behavior: Behavior normal.         Thought Content: Thought content normal.         Judgment: Judgment normal.        Results   Lab Results   Component Value Date    PSA 30.647 (H) 02/22/2025    PSA 29.897 (H) 08/15/2024    PSA 22.9 (H) 05/14/2024     Lab Results   Component Value Date    CALCIUM 9.4 11/25/2024    K 4.5 11/25/2024    CO2 29 11/25/2024     11/25/2024    BUN 24 11/25/2024    CREATININE 1.26 11/25/2024     Lab Results   Component Value Date    WBC 6.27 11/25/2024    HGB 13.8 11/25/2024    HCT 41.7 11/25/2024    MCV 84 11/25/2024     (L) 11/25/2024 "       Office Urine Dip  No results found for this or any previous visit (from the past hour).

## 2025-02-28 NOTE — PATIENT INSTRUCTIONS
"  Patient Education     Benign prostatic hyperplasia (enlarged prostate)   The Basics   Written by the doctors and editors at [facility]   What is benign prostatic hyperplasia? -- \"Benign prostatic hyperplasia\" is the medical term for an enlarged prostate. The prostate is a gland that surrounds the urethra (the tube that carries urine from the bladder out through the penis) (figure 1). This gland often gets bigger as a person gets older.  Benign prostatic hyperplasia, also called \"BPH,\" is a common problem. It has nothing to do with prostate cancer. In fact, the word \"benign\" means \"not cancer.\"  What are the symptoms of BPH? -- Many people with BPH have no symptoms at all. When symptoms do occur, they can include:   Needing to urinate often, especially at night   Having trouble starting to urinate (this means that you might have to wait or strain before urine will come out)   Having a weak urine stream   Leaking or dribbling urine   Feeling as though your bladder is not empty even after you urinate  In rare cases, BPH can make it so you cannot urinate at all. This is a serious problem. If you cannot urinate at all, call your doctor right away.  Is there a test for BPH? -- Yes. Your doctor can check for BPH by doing a rectal exam. That means that they will put a finger into your anus to check how big your prostate is and what it feels like (figure 2). Your doctor might also do urine or blood tests to see if your symptoms might be caused by another problem, such as a bladder infection.  If you have symptoms like the ones listed above, see your doctor or nurse. They can figure out if BPH is really what's causing them. Those symptoms can also be caused by other problems, so it's important to have them checked out.  Is there anything I can do on my own to feel better? -- Yes. You might be able to improve your BPH symptoms if you:   Drink less fluids, especially just before bed or going out.   Drink less alcohol and " "caffeine. These drinks can make you urinate more often.   Avoid cold and allergy medicines that contain antihistamines or decongestants. These medicines can make the symptoms of BPH worse.   Do \"double voiding.\" That means that after you empty your bladder, you wait a moment, relax, and try to urinate again. It might also help to try to urinate at certain times, even if you don't feel that you need to.   Urinate when you first feel the urge.  How is BPH treated? -- If you do have BPH, your doctor can talk to you about treatment options. But you don't have to get treated if your symptoms do not bother you. Unless you lose the ability to urinate completely, leaving BPH untreated will not hurt you.  Treatments options include:   Watchful waiting - This means that you wait to see if your symptoms change, but you don't have treatment right away. If you choose watchful waiting, you can decide to try treatment later if your symptoms get worse or if your symptoms start to bother you more.   Medicines - There are 2 types of medicine commonly used to treat BPH. One type relaxes the muscles that surround the urethra. The other type keeps the prostate from growing more or even helps shrink the prostate. In some cases, doctors suggest taking both types of medicine at the same time. Depending on your symptoms, your doctor might also suggest other medicines.   Surgery - There are several ways to treat BPH with surgery. They can involve removing some of the prostate, shrinking the prostate, or making the urethra wider so that more urine can flow through. For most of these procedures, a doctor inserts special tools into the urethra.  How do I choose which treatment to have? -- The right treatment for you will depend on:   How much your symptoms bother you   How you feel about the different treatment options  If your symptoms don't bother you very much, you might not need any treatment. But if your symptoms do bother you, you " probably should get treated.  Doctors often suggest trying medicines first to see if they help. If medicines don't do enough, surgery is also an option. As you think about your choices, remember that treatments can have a downside. For example, medicines can cause side effects. And surgery has some general risks, and can also sometimes cause sexual problems and other side effects.  When you're thinking about which treatment to have, ask your doctor or nurse these questions:   How likely is it that this treatment will improve my symptoms?   What are the risks or side effects of this treatment?   What happens if I don't have this treatment?  When should I call the doctor? -- Call for advice if:   You have pain in your back, shoulder, or belly.   You have a fever of 100.4°F (38°C) or higher, chills, burning, or pain when you urinate.   You are not able to urinate or have more problems urinating.  All topics are updated as new evidence becomes available and our peer review process is complete.  This topic retrieved from [employer] on: Feb 26, 2024.  Topic 11995 Version 21.0  Release: 32.2.4 - C32.56  © 2024 UpToDate, Inc. and/or its affiliates. All rights reserved.  figure 1: Prostate gland     This drawing shows male internal organs and a close-up of the prostate gland.  Graphic 19667 Version 5.0  figure 2: Rectal exam     During a rectal exam, the doctor or nurse puts a finger inside your rectum and feels your prostate gland. That way, they can see how big it is and whether it has bumps or dents or anything unusual.  Graphic 91439 Version 6.0  Consumer Information Use and Disclaimer   Disclaimer: This generalized information is a limited summary of diagnosis, treatment, and/or medication information. It is not meant to be comprehensive and should be used as a tool to help the user understand and/or assess potential diagnostic and treatment options. It does NOT include all information about conditions, treatments,  medications, side effects, or risks that may apply to a specific patient. It is not intended to be medical advice or a substitute for the medical advice, diagnosis, or treatment of a health care provider based on the health care provider's examination and assessment of a patient's specific and unique circumstances. Patients must speak with a health care provider for complete information about their health, medical questions, and treatment options, including any risks or benefits regarding use of medications. This information does not endorse any treatments or medications as safe, effective, or approved for treating a specific patient. UpToDate, Inc. and its affiliates disclaim any warranty or liability relating to this information or the use thereof.The use of this information is governed by the Terms of Use, available at https://www.woltersDibsieuwer.com/en/know/clinical-effectiveness-terms. 2024© UpToDate, Inc. and its affiliates and/or licensors. All rights reserved.  Copyright   © 2024 UpToDate, Inc. and/or its affiliates. All rights reserved.

## 2025-03-10 NOTE — PROGRESS NOTES
Ambulatory Visit  Name: Chel Rea      : 1955      MRN: [id number]  Encounter Provider: Nima Wolfe MD  Encounter Date: 3/13/2025   Encounter department: [facility] UROLOGY [place] [facility]    Assessment & Plan  BPH with elevated PSA and lower urinary tract symptoms  The patient and I again had a discussion about perioperative expectations for robotic simple prostatectomy.     I did explain that he may have an initial experience with worse urinary leakage from unchecked urge incontinence and that this will improve over time.     We discussed a small possibility of post-operative stress incontinence but that this is unlikely.    Further surgical risks were discussed including bleeding, infection, blood clot, leakage of urine from the bladder closure, and other medical complications.     He understands the above and all questions were answered.     271cc prostate With bladder trabeculations and significant thickening. Has not yet had cystoscopy.    - Schedule for cystoscopy and robotic simple prostatectomy next available   - Will need cardiac clearance preoperatively given his heart transplant history           History of Present Illness     Chel Rea is a 69 y.o. male who presents for follow up of BPH with elevated PSA.     Since the last visit, he has had no major changes in health. He has seen Dr. Early in the interim.     He is interested in proceeding with surgery.     He has stable lower urinary tract symptoms on flomax. He has hesitancy, weak stream, urgency, urge incontinence, and a sensation of incomplete emptying.     History obtained from : patient        Objective     There were no vitals taken for this visit.  Physical Exam  There were no vitals filed for this visit.   General: Well appearing, no acute distress   HEENT: normocephalic, atraumatic  Eyes: extraocular movements intact  Cardiovascular: normal rate   Respiratory: no respiratory distress, effort normal   Abdominal:  Non-distended, non-tender   : Deferred  MSK: Grossly normal range of motion   Neurological: No gross focal deficits  Behavioral: Normal mood and affect     Results  Lab Results   Component Value Date    PSA 30.647 (H) 02/22/2025    PSA 29.897 (H) 08/15/2024    PSA 22.9 (H) 05/14/2024     Lab Results   Component Value Date    CALCIUM 9.4 11/25/2024    K 4.5 11/25/2024    CO2 29 11/25/2024     11/25/2024    BUN 24 11/25/2024    CREATININE 1.26 11/25/2024     Lab Results   Component Value Date    WBC 6.27 11/25/2024    HGB 13.8 11/25/2024    HCT 41.7 11/25/2024    MCV 84 11/25/2024     (L) 11/25/2024       Imaging  I have personally reviewed pertinent films in PACS.    MRI PROSTATE MULTIPARAMETRIC WO W CONTRAST  1. PI-RADSv2.1 Category 2 - Low (clinically significant cancer is unlikely to be present).    2. No extraprostatic tumor, seminal vesicle invasion, pelvic lymphadenopathy, or pelvic osseous metastatic disease.    3. Calculated prostate volume of 271.7 cc.    Prostate gland boundaries were segmented using 3D advanced post-processing on an independent [employer] system workstation with active physician participation.    Workstation performed: [id number]      Office Urine Dip  No results found for this or any previous visit (from the past hour).]    Administrative Statements       I spent 35 minutes preparing for and engaging in consultation with this patient including personal review of imaging, all available outside records, and counseling regarding prognosis, treatment options, coordination of care, and planned course of treatment.

## 2025-03-13 ENCOUNTER — OFFICE VISIT (OUTPATIENT)
Dept: UROLOGY | Facility: CLINIC | Age: 70
End: 2025-03-13
Payer: MEDICARE

## 2025-03-13 VITALS
HEART RATE: 98 BPM | HEIGHT: 65 IN | BODY MASS INDEX: 25.83 KG/M2 | SYSTOLIC BLOOD PRESSURE: 124 MMHG | DIASTOLIC BLOOD PRESSURE: 76 MMHG | WEIGHT: 155 LBS | OXYGEN SATURATION: 98 %

## 2025-03-13 DIAGNOSIS — N40.1 BPH WITH ELEVATED PSA AND LOWER URINARY TRACT SYMPTOMS: Primary | ICD-10-CM

## 2025-03-13 DIAGNOSIS — R97.20 BPH WITH ELEVATED PSA AND LOWER URINARY TRACT SYMPTOMS: Primary | ICD-10-CM

## 2025-03-13 PROCEDURE — 99214 OFFICE O/P EST MOD 30 MIN: CPT

## 2025-03-13 RX ORDER — HEPARIN SODIUM 5000 [USP'U]/ML
5000 INJECTION, SOLUTION INTRAVENOUS; SUBCUTANEOUS ONCE
OUTPATIENT
Start: 2025-03-13 | End: 2025-03-13

## 2025-03-13 NOTE — Clinical Note
Please schedule for flexible cystoscopy and robotic simple prostatectomy next available.   Patient has history of heart transplant so will need cardiac clearance.   Urine culture preoperatively.

## 2025-03-13 NOTE — LETTER
2025     Reyes Early MD  [address]  [address]  [place] [address]    Patient: Chel Rea   YOB: 1955   Date of Visit: 3/13/2025       Dear Dr. Early:    Thank you for referring Chel Rea to me for evaluation. Below are my notes for this consultation.    If you have questions, please do not hesitate to call me. I look forward to following your patient along with you.         Sincerely,        Nima Wolfe MD        CC: No Recipients    Nima Wolfe MD  3/13/2025  9:18 AM  Sign when Signing Visit  Ambulatory Visit  Name: Chel Rea      : 1955      MRN: [id number]  Encounter Provider: Nima Wolfe MD  Encounter Date: 3/13/2025   Encounter department: [facility]OLOGY [facility]    Assessment & Plan  BPH with elevated PSA and lower urinary tract symptoms  The patient and I again had a discussion about perioperative expectations for robotic simple prostatectomy.     I did explain that he may have an initial experience with worse urinary leakage from unchecked urge incontinence and that this will improve over time.     We discussed a small possibility of post-operative stress incontinence but that this is unlikely.    Further surgical risks were discussed including bleeding, infection, blood clot, leakage of urine from the bladder closure, and other medical complications.     He understands the above and all questions were answered.     271cc prostate With bladder trabeculations and significant thickening. Has not yet had cystoscopy.    - Schedule for cystoscopy and robotic simple prostatectomy next available   - Will need cardiac clearance preoperatively given his heart transplant history           History of Present Illness    Chel Rea is a 69 y.o. male who presents for follow up of BPH with elevated PSA.     Since the last visit, he has had no major changes in health. He has seen Dr. Early in the interim.     He is  interested in proceeding with surgery.     He has stable lower urinary tract symptoms on flomax. He has hesitancy, weak stream, urgency, urge incontinence, and a sensation of incomplete emptying.     History obtained from : patient        Objective    There were no vitals taken for this visit.  Physical Exam  There were no vitals filed for this visit.   General: Well appearing, no acute distress   HEENT: normocephalic, atraumatic  Eyes: extraocular movements intact  Cardiovascular: normal rate   Respiratory: no respiratory distress, effort normal   Abdominal: Non-distended, non-tender   : Deferred  MSK: Grossly normal range of motion   Neurological: No gross focal deficits  Behavioral: Normal mood and affect     Results  Lab Results   Component Value Date    PSA 30.647 (H) 02/22/2025    PSA 29.897 (H) 08/15/2024    PSA 22.9 (H) 05/14/2024     Lab Results   Component Value Date    CALCIUM 9.4 11/25/2024    K 4.5 11/25/2024    CO2 29 11/25/2024     11/25/2024    BUN 24 11/25/2024    CREATININE 1.26 11/25/2024     Lab Results   Component Value Date    WBC 6.27 11/25/2024    HGB 13.8 11/25/2024    HCT 41.7 11/25/2024    MCV 84 11/25/2024     (L) 11/25/2024       Imaging  I have personally reviewed pertinent films in PACS.    MRI PROSTATE MULTIPARAMETRIC WO W CONTRAST  1. PI-RADSv2.1 Category 2 - Low (clinically significant cancer is unlikely to be present).    2. No extraprostatic tumor, seminal vesicle invasion, pelvic lymphadenopathy, or pelvic osseous metastatic disease.    3. Calculated prostate volume of 271.7 cc.    Prostate gland boundaries were segmented using 3D advanced post-processing on an independent [employer] system workstation with active physician participation.    Workstation performed: [id number]      Office Urine Dip  No results found for this or any previous visit (from the past hour).]    Administrative Statements      I spent 35 minutes preparing for and engaging in consultation  with this patient including personal review of imaging, all available outside records, and counseling regarding prognosis, treatment options, coordination of care, and planned course of treatment.

## 2025-03-27 ENCOUNTER — PREP FOR PROCEDURE (OUTPATIENT)
Dept: UROLOGY | Facility: AMBULATORY SURGERY CENTER | Age: 70
End: 2025-03-27

## 2025-03-27 ENCOUNTER — TELEPHONE (OUTPATIENT)
Dept: UROLOGY | Facility: AMBULATORY SURGERY CENTER | Age: 70
End: 2025-03-27

## 2025-03-27 DIAGNOSIS — Z79.01 LONG TERM (CURRENT) USE OF ANTICOAGULANTS: ICD-10-CM

## 2025-03-27 DIAGNOSIS — R39.89 SUSPECTED UTI: ICD-10-CM

## 2025-03-27 DIAGNOSIS — Z01.812 PRE-OPERATIVE LABORATORY EXAMINATION: ICD-10-CM

## 2025-03-27 DIAGNOSIS — R97.20 BPH WITH ELEVATED PSA AND LOWER URINARY TRACT SYMPTOMS: Primary | ICD-10-CM

## 2025-03-27 DIAGNOSIS — Z01.810 PRE-OPERATIVE CARDIOVASCULAR EXAMINATION: ICD-10-CM

## 2025-03-27 DIAGNOSIS — N40.1 BPH WITH ELEVATED PSA AND LOWER URINARY TRACT SYMPTOMS: Primary | ICD-10-CM

## 2025-03-27 NOTE — TELEPHONE ENCOUNTER
Spoke with patient and confirmed surgery date of  06/19/25  Type of surgery: Robotic Prostatectomy   Operating physician:Dr. Wolef  Location of surgery: [facility]    Verbally went over prep with patient on 03/27/25  NPO  Bowel prep? Yes  Hospital calls afternoon prior with arrival time (calls Friday afternoon for Monday surgery)  Patient needs ride to and from surgery   outpatient with a 23 hour hold   Pre-op testing to be done 2 weeks prior to surgery. All testing can be done as a walk-in. EKG can only be done as a walk-in at any [facility].  CBC, BMP, PTT, PTINR, T/S, UCX, EKG  Blood thinners:   None  Clearances needed: Cardiac    Mailed to patient   Copy of packet scanned into Media  Labs in packet and in electronic record   Soap and Bowel  prep in packet  post-op in packet     Consent: in media     Cardiac Clearance:  Appt with: Dr. Junior   Appt date and time: 05/01/25  Clearance form faxed:  Best fax number: [phone]    RBC blood bank done on: 3/27/25

## 2025-04-09 RX ORDER — SODIUM CHLORIDE, SODIUM LACTATE, POTASSIUM CHLORIDE, CALCIUM CHLORIDE 600; 310; 30; 20 MG/100ML; MG/100ML; MG/100ML; MG/100ML
50 INJECTION, SOLUTION INTRAVENOUS CONTINUOUS
Status: CANCELLED | OUTPATIENT
Start: 2025-04-09

## 2025-04-10 ENCOUNTER — ANESTHESIA EVENT (OUTPATIENT)
Dept: GASTROENTEROLOGY | Facility: HOSPITAL | Age: 70
End: 2025-04-10
Payer: MEDICARE

## 2025-04-10 ENCOUNTER — ANESTHESIA (OUTPATIENT)
Dept: GASTROENTEROLOGY | Facility: HOSPITAL | Age: 70
End: 2025-04-10
Payer: MEDICARE

## 2025-04-10 ENCOUNTER — HOSPITAL ENCOUNTER (OUTPATIENT)
Dept: GASTROENTEROLOGY | Facility: HOSPITAL | Age: 70
Setting detail: OUTPATIENT SURGERY
End: 2025-04-10
Attending: STUDENT IN AN ORGANIZED HEALTH CARE EDUCATION/TRAINING PROGRAM
Payer: MEDICARE

## 2025-04-10 VITALS
HEART RATE: 98 BPM | HEIGHT: 65 IN | RESPIRATION RATE: 15 BRPM | TEMPERATURE: 97.4 F | SYSTOLIC BLOOD PRESSURE: 105 MMHG | DIASTOLIC BLOOD PRESSURE: 76 MMHG | OXYGEN SATURATION: 98 % | BODY MASS INDEX: 25.83 KG/M2 | WEIGHT: 155 LBS

## 2025-04-10 DIAGNOSIS — K21.9 GASTROESOPHAGEAL REFLUX DISEASE WITHOUT ESOPHAGITIS: ICD-10-CM

## 2025-04-10 PROCEDURE — 43239 EGD BIOPSY SINGLE/MULTIPLE: CPT | Performed by: STUDENT IN AN ORGANIZED HEALTH CARE EDUCATION/TRAINING PROGRAM

## 2025-04-10 PROCEDURE — 88305 TISSUE EXAM BY PATHOLOGIST: CPT | Performed by: PATHOLOGY

## 2025-04-10 RX ORDER — SODIUM CHLORIDE, SODIUM LACTATE, POTASSIUM CHLORIDE, CALCIUM CHLORIDE 600; 310; 30; 20 MG/100ML; MG/100ML; MG/100ML; MG/100ML
50 INJECTION, SOLUTION INTRAVENOUS CONTINUOUS
Status: DISCONTINUED | OUTPATIENT
Start: 2025-04-10 | End: 2025-04-14 | Stop reason: HOSPADM

## 2025-04-10 RX ORDER — KETAMINE HCL IN NACL, ISO-OSM 100MG/10ML
SYRINGE (ML) INJECTION AS NEEDED
Status: DISCONTINUED | OUTPATIENT
Start: 2025-04-10 | End: 2025-04-10

## 2025-04-10 RX ORDER — GLYCOPYRROLATE 0.2 MG/ML
INJECTION INTRAMUSCULAR; INTRAVENOUS AS NEEDED
Status: DISCONTINUED | OUTPATIENT
Start: 2025-04-10 | End: 2025-04-10

## 2025-04-10 RX ORDER — PROPOFOL 10 MG/ML
INJECTION, EMULSION INTRAVENOUS AS NEEDED
Status: DISCONTINUED | OUTPATIENT
Start: 2025-04-10 | End: 2025-04-10

## 2025-04-10 RX ORDER — LIDOCAINE HYDROCHLORIDE 10 MG/ML
INJECTION, SOLUTION EPIDURAL; INFILTRATION; INTRACAUDAL; PERINEURAL AS NEEDED
Status: DISCONTINUED | OUTPATIENT
Start: 2025-04-10 | End: 2025-04-10

## 2025-04-10 RX ORDER — SODIUM CHLORIDE, SODIUM LACTATE, POTASSIUM CHLORIDE, CALCIUM CHLORIDE 600; 310; 30; 20 MG/100ML; MG/100ML; MG/100ML; MG/100ML
INJECTION, SOLUTION INTRAVENOUS CONTINUOUS PRN
Status: DISCONTINUED | OUTPATIENT
Start: 2025-04-10 | End: 2025-04-10

## 2025-04-10 RX ADMIN — Medication 20 MG: at 07:44

## 2025-04-10 RX ADMIN — LIDOCAINE HYDROCHLORIDE 50 MG: 10 INJECTION, SOLUTION EPIDURAL; INFILTRATION; INTRACAUDAL at 07:40

## 2025-04-10 RX ADMIN — PROPOFOL 30 MG: 10 INJECTION, EMULSION INTRAVENOUS at 07:42

## 2025-04-10 RX ADMIN — PROPOFOL 30 MG: 10 INJECTION, EMULSION INTRAVENOUS at 07:52

## 2025-04-10 RX ADMIN — SODIUM CHLORIDE, SODIUM LACTATE, POTASSIUM CHLORIDE, AND CALCIUM CHLORIDE: .6; .31; .03; .02 INJECTION, SOLUTION INTRAVENOUS at 07:35

## 2025-04-10 RX ADMIN — PROPOFOL 30 MG: 10 INJECTION, EMULSION INTRAVENOUS at 07:49

## 2025-04-10 RX ADMIN — SODIUM CHLORIDE, SODIUM LACTATE, POTASSIUM CHLORIDE, AND CALCIUM CHLORIDE 50 ML/HR: .6; .31; .03; .02 INJECTION, SOLUTION INTRAVENOUS at 07:18

## 2025-04-10 RX ADMIN — PROPOFOL 30 MG: 10 INJECTION, EMULSION INTRAVENOUS at 07:56

## 2025-04-10 RX ADMIN — GLYCOPYRROLATE 0.2 MG: 0.2 INJECTION, SOLUTION INTRAMUSCULAR; INTRAVENOUS at 07:42

## 2025-04-10 RX ADMIN — PROPOFOL 20 MG: 10 INJECTION, EMULSION INTRAVENOUS at 07:59

## 2025-04-10 RX ADMIN — PROPOFOL 110 MG: 10 INJECTION, EMULSION INTRAVENOUS at 07:40

## 2025-04-10 NOTE — ANESTHESIA POSTPROCEDURE EVALUATION
Post-Op Assessment Note    CV Status:  Stable    Pain management: adequate       Mental Status:  Alert and awake   Hydration Status:  Euvolemic   PONV Controlled:  Controlled   Airway Patency:  Patent     Post Op Vitals Reviewed: Yes    No anethesia notable event occurred.    Staff: CRNA           Last Filed PACU Vitals:  Vitals Value Taken Time   Temp 97.4 °F (36.3 °C) 04/10/25 0800   Pulse 100 04/10/25 0800   /72 04/10/25 0800   Resp 17 04/10/25 0800   SpO2 96 % 04/10/25 0800       Modified Park:     Vitals Value Taken Time   Activity 2 04/10/25 0800   Respiration 2 04/10/25 0800   Circulation 2 04/10/25 0800   Consciousness 2 04/10/25 0800   Oxygen Saturation 2 04/10/25 0800     Modified Park Score: 10

## 2025-04-10 NOTE — H&P
History and Physical - [facility]  Chel Rea 69 y.o. male MRN: [id number]                  HPI: Chel Rea is a 69 y.o. year old male who presents for EGD for hx of esophagitis      REVIEW OF SYSTEMS: Per the HPI, and otherwise unremarkable.    Historical Information   Past Medical History:   Diagnosis Date    Benign prostatic hyperplasia     transperineal bx of prostate  today 2024    Colon polyp     GERD (gastroesophageal reflux disease)     HL (hearing loss)     Hyperlipidemia     Hypertension     PE (pulmonary thromboembolism) (HCC)     Shortness of breath     walking up a hill    UTI (urinary tract infection)     Wears partial dentures     upper     Past Surgical History:   Procedure Laterality Date    CARDIAC SURGERY  2017    Heart transplant    COLONOSCOPY      WA PROSTATE NEEDLE BIOPSY ANY APPROACH N/A 2024    Procedure: TRANSPERINEAL ULTRASOUND GUIDED BX PROSTATE;  Surgeon: Nima Wolfe MD;  Location: [facility];  Service: Urology    WA RPR 1ST INGUN HRNA AGE 5 YRS/> REDUCIBLE Left 2024    Procedure: OPEN LEFT INGUINAL HERNIA REPAIR WITH MESH;  Surgeon: Janet Chavez MD;  Location:  MAIN OR;  Service: General    TONSILLECTOMY       Social History   Social History     Substance and Sexual Activity   Alcohol Use Yes    Alcohol/week: 1.0 standard drink of alcohol    Types: 1 Standard drinks or equivalent per week    Comment: socially     Social History     Substance and Sexual Activity   Drug Use Never     Social History     Tobacco Use   Smoking Status Former    Current packs/day: 0.00    Average packs/day: 2.5 packs/day for 46.0 years (115.0 ttl pk-yrs)    Types: Cigarettes    Start date:     Quit date: 2016    Years since quittin.2   Smokeless Tobacco Never     Family History   Problem Relation Age of Onset    Hypertension Father     Diabetes Mother        Meds/Allergies       Current Outpatient Medications:     amLODIPine  "(NORVASC) 10 mg tablet    aspirin (ECOTRIN LOW STRENGTH) 81 mg EC tablet    loratadine (CLARITIN) 10 mg tablet    losartan (COZAAR) 100 MG tablet    mirtazapine (REMERON) 15 mg tablet    mycophenolate (CELLCEPT) 250 mg capsule    omeprazole (PriLOSEC) 40 MG capsule    pravastatin (PRAVACHOL) 40 mg tablet    tacrolimus (PROGRAF) 0.5 mg capsule    tacrolimus (PROGRAF) 1 mg capsule    tadalafil (CIALIS) 5 MG tablet    tamsulosin (FLOMAX) 0.4 mg    famotidine (PEPCID) 20 mg tablet    fluocinonide (LIDEX) 0.05 % cream    Current Facility-Administered Medications:     lactated ringers infusion, 50 mL/hr, Intravenous, Continuous, 50 mL/hr at 04/10/25 0718    Allergies   Allergen Reactions    Oxycodone-Acetaminophen Hives     Other reaction(s): rash    Codeine Hives     URTICARIA   Other reaction(s): rash and hives       Objective     /84   Pulse 98   Temp (!) 97.1 °F (36.2 °C) (Temporal)   Resp 16   Ht 5' 5\" (1.651 m)   Wt 70.3 kg (155 lb)   SpO2 99%   BMI 25.79 kg/m²       PHYSICAL EXAM    Gen: NAD  Head: NCAT  CV: RRR  CHEST: Clear  ABD: soft, NT/ND  EXT: no edema      ASSESSMENT/PLAN:  This is a 69 y.o. year old male here for EGD, and he is stable and optimized for his procedure.    "

## 2025-04-10 NOTE — ANESTHESIA PREPROCEDURE EVALUATION
Procedure:  EGD    Heart transplant 2017. Stress Echo normal EF. Good functional status.   Relevant Problems   CARDIO   (+) Combined arterial insufficiency and corporo-venous occlusive erectile dysfunction   (+) Hyperlipidemia LDL goal <100   (+) Hypertension      GI/HEPATIC   (+) GERD (gastroesophageal reflux disease)      /RENAL   (+) Benign prostatic hyperplasia with urinary obstruction   (+) Stage 3a chronic kidney disease (HCC)      HEMATOLOGY   (+) Thrombocytopenia (HCC)      Surgery/Wound/Pain   (+) Heart transplant, heterotopic, status (HCC)      Cardiovascular/Peripheral Vascular   (+) Chronic systolic heart failure (HCC)      Dermatology   (+) Melanoma in situ of other part of trunk (HCC)        Physical Exam    Airway    Mallampati score: II  TM Distance: >3 FB  Neck ROM: full     Dental    upper dentures    Cardiovascular  Cardiovascular exam normal    Pulmonary  Pulmonary exam normal     Other Findings        Anesthesia Plan  ASA Score- 2     Anesthesia Type- IV sedation with anesthesia with ASA Monitors.         Additional Monitors:     Airway Plan:            Plan Factors-Exercise tolerance (METS): >4 METS.    Chart reviewed. EKG reviewed.  Existing labs reviewed. Patient summary reviewed.    Patient is not a current smoker. Patient not instructed to abstain from smoking on day of procedure. Patient did not smoke on day of surgery.            Induction-     Postoperative Plan-     Perioperative Resuscitation Plan - Level 1 - Full Code.       Informed Consent- Anesthetic plan and risks discussed with patient.  I personally reviewed this patient with the CRNA. Discussed and agreed on the Anesthesia Plan with the CRNA..      NPO Status:  Vitals Value Taken Time   Date of last liquid 04/10/25 04/10/25 0701   Time of last liquid 0500 04/10/25 0701   Date of last solid 04/09/25 04/10/25 0701   Time of last solid 1800 04/10/25 0701         Lab Results   Component Value Date    HGBA1C 5.1 01/17/2024        Lab Results   Component Value Date    K 4.5 11/25/2024     11/25/2024    CO2 29 11/25/2024    BUN 24 11/25/2024    CREATININE 1.26 11/25/2024    CALCIUM 9.4 11/25/2024    AST 10 (L) 11/25/2024    ALT 8 11/25/2024    ALKPHOS 53 11/25/2024    EGFR 57 11/25/2024       Lab Results   Component Value Date    WBC 6.27 11/25/2024    HGB 13.8 11/25/2024    HCT 41.7 11/25/2024    MCV 84 11/25/2024     (L) 11/25/2024     Sinus tachycardia  Right bundle branch block  Abnormal ECG  No previous ECGs available  Confirmed by Paola Christopher ([id number]) on 11/25/2024 4:15:28 PM     Specimen Collected: 11/25/24 12:27

## 2025-04-14 PROCEDURE — 88305 TISSUE EXAM BY PATHOLOGIST: CPT | Performed by: PATHOLOGY

## 2025-04-22 ENCOUNTER — RESULTS FOLLOW-UP (OUTPATIENT)
Dept: GASTROENTEROLOGY | Facility: MEDICAL CENTER | Age: 70
End: 2025-04-22

## 2025-04-29 NOTE — PROGRESS NOTES
Outpatient Cardiology Note - Chel Rea 69 y.o. male MRN: [id number]    @ Encounter: [id number]        Assessment:  # OHT  Date: 7/7/2017, [facility]  Etiology: NICM, presumed etoh  Induction:      Rejections: repeated     Immunosuppression  Tacrolimus 2 mg AM, 2.5 mg PM  9/13/24- 29- but this was not trough  4/24/23: 8  3/23/23: 16  Cellcept 500 mg BID     Prophylaxis:       Health screening:       Vaccinations   Zoster Vaccine (1 of 2) Never done ---   Tetanus Booster 07/01/2016 7/1/2006   Pneumococcal Immunization (2 - PCV) 02/21/2018 2/21/2017   COVID         Studies- personally reviewed by me   Stress Echo 9/27/23:  3 min, 38 sec  LVEF: 65%, normal hemodynamic response  Non ischemic    Echo 1/26/21: [facility]  LVEF: 55%  RV: mildly dilated, mildly reduced    Echo 9/26/16: [facility]- pre transplant  LVEF: 15%    # lipid management- pravastatin 40 mg daily  1/17/24: LDL 37, HDL 41  8/21/23: LDL 26, HDL 52    # CKD, Cr 1/17/24: 1.23  # HTN- losartan 100 mg daily, amlodipine 10 mg daily  # former smoker, quit 2016  # insomnia  # hx of skin cancer- - lanced it, assuming basal cell ca  Last derm appt in 11/2023- biopsy done, clear     Today's Plan:  Up to date with Derm  Needs tac level- taught to not take tacrolimus until after blood drawn so we get a trough, get in AM  EGD and colonoscopy done Jan 2025  Echo follow up  BP controlled on current meds  Lipids at goal on pravastatin    HPI:       70 yo male with hx of OHT in 2017 at [facility] for NICM, presumed etoh per records. He has since been following with [facility] Heart failure group and is transitioning to [facility]. He is maintained on tacrolimus and cellcept.      No new complaints. Was unhappy with care at [facility].        Interim:  Saw Derm in November- biopsy done- clear  Does a lot of walking, no symptoms      Review of Systems   Constitutional:  Negative for activity change, appetite change, fatigue and unexpected weight change.   HENT:  Negative for  congestion and nosebleeds.    Eyes: Negative.    Respiratory:  Negative for cough, chest tightness and shortness of breath.    Cardiovascular:  Negative for chest pain, palpitations and leg swelling.   Gastrointestinal:  Negative for abdominal distention.   Endocrine: Negative.    Genitourinary: Negative.    Musculoskeletal: Negative.    Skin: Negative.    Neurological:  Negative for dizziness, syncope and weakness.   Hematological: Negative.    Psychiatric/Behavioral: Negative.         .    Current Outpatient Medications:     amLODIPine (NORVASC) 10 mg tablet, Take 1 tablet (10 mg total) by mouth daily, Disp: 90 tablet, Rfl: 3    aspirin (ECOTRIN LOW STRENGTH) 81 mg EC tablet, Take 81 mg by mouth daily, Disp: , Rfl:     famotidine (PEPCID) 20 mg tablet, Take 20 mg by mouth daily, Disp: , Rfl:     fluocinonide (LIDEX) 0.05 % cream, Use 2 times a day as needed to affected areas, Disp: 60 g, Rfl: 2    loratadine (CLARITIN) 10 mg tablet, Take 10 mg by mouth daily, Disp: , Rfl:     losartan (COZAAR) 100 MG tablet, Take 100 mg by mouth daily, Disp: , Rfl:     mirtazapine (REMERON) 15 mg tablet, Take 15 mg by mouth daily as needed (as needed), Disp: , Rfl:     mycophenolate (CELLCEPT) 250 mg capsule, TAKE 2 CAPSULES (500 MG) BY MOUTH TWO (2) TIMES A  DAY, Disp: , Rfl:     omeprazole (PriLOSEC) 40 MG capsule, Take 1 capsule (40 mg total) by mouth daily, Disp: 30 capsule, Rfl: 2    pravastatin (PRAVACHOL) 40 mg tablet, TAKE 1 TABLET (40 MG) BY MOUTH DAILY, Disp: , Rfl:     tacrolimus (PROGRAF) 0.5 mg capsule, Take 0.5 mg by mouth daily at bedtime, Disp: , Rfl:     tacrolimus (PROGRAF) 1 mg capsule, Take 2 mg by mouth 2 (two) times a day, Disp: , Rfl:     tadalafil (CIALIS) 5 MG tablet, TAKE 1 TABLET BY MOUTH DAILY, Disp: 30 tablet, Rfl: 5    tamsulosin (FLOMAX) 0.4 mg, Take 1 capsule (0.4 mg total) by mouth daily with dinner, Disp: 90 capsule, Rfl: 3    Social History     Socioeconomic History    Marital status:       Spouse name: Not on file    Number of children: Not on file    Years of education: Not on file    Highest education level: Not on file   Occupational History    Not on file   Tobacco Use    Smoking status: Former     Current packs/day: 0.00     Average packs/day: 2.5 packs/day for 46.0 years (115.0 ttl pk-yrs)     Types: Cigarettes     Start date:      Quit date: 2016     Years since quittin.3    Smokeless tobacco: Never   Vaping Use    Vaping status: Never Used   Substance and Sexual Activity    Alcohol use: Yes     Alcohol/week: 1.0 standard drink of alcohol     Types: 1 Standard drinks or equivalent per week     Comment: socially    Drug use: Never    Sexual activity: Yes     Partners: Male     Birth control/protection: Condom Male   Other Topics Concern    Not on file   Social History Narrative    Not on file     Social Drivers of Health     Financial Resource Strain: Not on file   Food Insecurity: Not on file   Transportation Needs: Not on file   Physical Activity: Not on file   Stress: Not on file   Social Connections: Not on file   Intimate Partner Violence: Not on file   Housing Stability: Not on file       Family History   Problem Relation Age of Onset    Hypertension Father     Diabetes Mother        Physical Exam:    Vitals: There were no vitals taken for this visit., There is no height or weight on file to calculate BMI.,   Wt Readings from Last 3 Encounters:   04/10/25 70.3 kg (155 lb)   25 70.3 kg (155 lb)   25 70.3 kg (155 lb)         Physical Exam  Constitutional:       Appearance: He is well-developed.   HENT:      Head: Normocephalic and atraumatic.   Eyes:      Pupils: Pupils are equal, round, and reactive to light.   Neck:      Vascular: No JVD.   Cardiovascular:      Rate and Rhythm: Normal rate and regular rhythm.      Heart sounds: No murmur heard.  Pulmonary:      Effort: Pulmonary effort is normal. No respiratory distress.      Breath sounds: Normal breath sounds.    Abdominal:      General: There is no distension.      Palpations: Abdomen is soft.      Tenderness: There is no abdominal tenderness.   Musculoskeletal:         General: Normal range of motion.      Cervical back: Normal range of motion.   Skin:     General: Skin is warm and dry.      Findings: No rash.   Neurological:      Mental Status: He is alert and oriented to person, place, and time.         Labs & Results:    Lab Results   Component Value Date    WBC 6.27 11/25/2024    HGB 13.8 11/25/2024    HCT 41.7 11/25/2024    MCV 84 11/25/2024     (L) 11/25/2024     Lab Results   Component Value Date    SODIUM 139 11/25/2024    K 4.5 11/25/2024     11/25/2024    CO2 29 11/25/2024    BUN 24 11/25/2024    CREATININE 1.26 11/25/2024    GLUC 92 11/25/2024    CALCIUM 9.4 11/25/2024     Lab Results   Component Value Date    BNP 36 12/01/2023      Lab Results   Component Value Date    CHOLESTEROL 92 01/17/2024    CHOLESTEROL 94 08/21/2023     Lab Results   Component Value Date    HDL 41 01/17/2024    HDL 52 08/21/2023     Lab Results   Component Value Date    TRIG 72 01/17/2024    TRIG 82 08/21/2023     Lab Results   Component Value Date    NONHDLC 51 01/17/2024    NONHDLC 42 08/21/2023     EKG personally reviewed by Ike Mixon DO.     Counseling / Coordination of Care  Time spent today 40 minutes.  Greater than 50% of total time was spent with the patient and / or family counseling and / or coordination of care. We discussed diagnoses, most recent studies and any changes in treatment  Thank you for the opportunity to participate in the care of this patient.    IKE MIXON D.O.  DIRECTOR OF HEART FAILURE/ PULMONARY HYPERTENSION  MEDICAL DIRECTOR OF LVAD PROGRAM  [facility]

## 2025-04-30 NOTE — RESULT ENCOUNTER NOTE
Called the patient and went over the result message. Patient verbalized understanding. Patient indicated they want to be called with results as they cannot get into [facility] and the number to call for help did not get him anywhere. Gastro note placed to call with results and added to demographics. Thank you!

## 2025-05-01 ENCOUNTER — OFFICE VISIT (OUTPATIENT)
Dept: CARDIOLOGY CLINIC | Facility: CLINIC | Age: 70
End: 2025-05-01
Payer: MEDICARE

## 2025-05-01 VITALS
DIASTOLIC BLOOD PRESSURE: 80 MMHG | WEIGHT: 153 LBS | SYSTOLIC BLOOD PRESSURE: 128 MMHG | HEART RATE: 96 BPM | BODY MASS INDEX: 25.49 KG/M2 | HEIGHT: 65 IN

## 2025-05-01 DIAGNOSIS — Z94.1 HEART TRANSPLANT, HETEROTOPIC, STATUS (HCC): Primary | ICD-10-CM

## 2025-05-01 DIAGNOSIS — Z01.810 PRE-OPERATIVE CARDIOVASCULAR EXAMINATION: ICD-10-CM

## 2025-05-01 DIAGNOSIS — I10 PRIMARY HYPERTENSION: ICD-10-CM

## 2025-05-01 DIAGNOSIS — R97.20 BPH WITH ELEVATED PSA AND LOWER URINARY TRACT SYMPTOMS: ICD-10-CM

## 2025-05-01 DIAGNOSIS — Z94.1 HISTORY OF HEART TRANSPLANT (HCC): ICD-10-CM

## 2025-05-01 DIAGNOSIS — E78.5 HYPERLIPIDEMIA LDL GOAL <100: ICD-10-CM

## 2025-05-01 DIAGNOSIS — N40.1 BPH WITH ELEVATED PSA AND LOWER URINARY TRACT SYMPTOMS: ICD-10-CM

## 2025-05-01 LAB
ATRIAL RATE: 96 BPM
P AXIS: 66 DEGREES
PR INTERVAL: 140 MS
QRS AXIS: 137 DEGREES
QRSD INTERVAL: 130 MS
QT INTERVAL: 362 MS
QTC INTERVAL: 457 MS
T WAVE AXIS: 65 DEGREES
VENTRICULAR RATE: 96 BPM

## 2025-05-01 PROCEDURE — 99214 OFFICE O/P EST MOD 30 MIN: CPT | Performed by: INTERNAL MEDICINE

## 2025-05-01 PROCEDURE — 93000 ELECTROCARDIOGRAM COMPLETE: CPT | Performed by: INTERNAL MEDICINE

## 2025-05-06 ENCOUNTER — LAB REQUISITION (OUTPATIENT)
Dept: LAB | Facility: HOSPITAL | Age: 70
End: 2025-05-06
Payer: MEDICARE

## 2025-05-06 DIAGNOSIS — Z94.1 HEART TRANSPLANT STATUS (HCC): ICD-10-CM

## 2025-05-06 LAB
ALBUMIN SERPL BCG-MCNC: 4.5 G/DL (ref 3.5–5)
ALP SERPL-CCNC: 68 U/L (ref 34–104)
ALT SERPL W P-5'-P-CCNC: 10 U/L (ref 7–52)
ANION GAP SERPL CALCULATED.3IONS-SCNC: 12 MMOL/L (ref 4–13)
AST SERPL W P-5'-P-CCNC: 19 U/L (ref 13–39)
BASOPHILS # BLD AUTO: 0.04 THOUSANDS/ÂΜL (ref 0–0.1)
BASOPHILS NFR BLD AUTO: 1 % (ref 0–1)
BILIRUB SERPL-MCNC: 0.54 MG/DL (ref 0.2–1)
BUN SERPL-MCNC: 19 MG/DL (ref 5–25)
CALCIUM SERPL-MCNC: 9.7 MG/DL (ref 8.4–10.2)
CHLORIDE SERPL-SCNC: 104 MMOL/L (ref 96–108)
CHOLEST SERPL-MCNC: 108 MG/DL (ref ?–200)
CO2 SERPL-SCNC: 24 MMOL/L (ref 21–32)
CREAT SERPL-MCNC: 1.43 MG/DL (ref 0.6–1.3)
EOSINOPHIL # BLD AUTO: 0.06 THOUSAND/ÂΜL (ref 0–0.61)
EOSINOPHIL NFR BLD AUTO: 1 % (ref 0–6)
ERYTHROCYTE [DISTWIDTH] IN BLOOD BY AUTOMATED COUNT: 14.5 % (ref 11.6–15.1)
GFR SERPL CREATININE-BSD FRML MDRD: 49 ML/MIN/1.73SQ M
GLUCOSE P FAST SERPL-MCNC: 55 MG/DL (ref 65–99)
HCT VFR BLD AUTO: 47.6 % (ref 36.5–49.3)
HDLC SERPL-MCNC: 53 MG/DL
HGB BLD-MCNC: 15.5 G/DL (ref 12–17)
IMM GRANULOCYTES # BLD AUTO: 0.02 THOUSAND/UL (ref 0–0.2)
IMM GRANULOCYTES NFR BLD AUTO: 0 % (ref 0–2)
LDLC SERPL CALC-MCNC: 41 MG/DL (ref 0–100)
LYMPHOCYTES # BLD AUTO: 1.6 THOUSANDS/ÂΜL (ref 0.6–4.47)
LYMPHOCYTES NFR BLD AUTO: 26 % (ref 14–44)
MCH RBC QN AUTO: 28.1 PG (ref 26.8–34.3)
MCHC RBC AUTO-ENTMCNC: 32.6 G/DL (ref 31.4–37.4)
MCV RBC AUTO: 86 FL (ref 82–98)
MONOCYTES # BLD AUTO: 0.49 THOUSAND/ÂΜL (ref 0.17–1.22)
MONOCYTES NFR BLD AUTO: 8 % (ref 4–12)
NEUTROPHILS # BLD AUTO: 3.84 THOUSANDS/ÂΜL (ref 1.85–7.62)
NEUTS SEG NFR BLD AUTO: 64 % (ref 43–75)
NONHDLC SERPL-MCNC: 55 MG/DL
NRBC BLD AUTO-RTO: 0 /100 WBCS
PLATELET # BLD AUTO: 134 THOUSANDS/UL (ref 149–390)
PMV BLD AUTO: 11.5 FL (ref 8.9–12.7)
POTASSIUM SERPL-SCNC: 4.2 MMOL/L (ref 3.5–5.3)
PROT SERPL-MCNC: 7.7 G/DL (ref 6.4–8.4)
RBC # BLD AUTO: 5.51 MILLION/UL (ref 3.88–5.62)
SODIUM SERPL-SCNC: 140 MMOL/L (ref 135–147)
TRIGL SERPL-MCNC: 72 MG/DL (ref ?–150)
WBC # BLD AUTO: 6.05 THOUSAND/UL (ref 4.31–10.16)

## 2025-05-06 PROCEDURE — 80053 COMPREHEN METABOLIC PANEL: CPT | Performed by: INTERNAL MEDICINE

## 2025-05-06 PROCEDURE — 80061 LIPID PANEL: CPT | Performed by: INTERNAL MEDICINE

## 2025-05-06 PROCEDURE — 85025 COMPLETE CBC W/AUTO DIFF WBC: CPT | Performed by: INTERNAL MEDICINE

## 2025-05-06 PROCEDURE — 80197 ASSAY OF TACROLIMUS: CPT | Performed by: INTERNAL MEDICINE

## 2025-05-08 LAB — TACROLIMUS BLD-MCNC: 13.3 NG/ML (ref 3–15)

## 2025-05-28 NOTE — PRE-PROCEDURE INSTRUCTIONS
Pre-Surgery Instructions:   Medication Instructions    amLODIPine (NORVASC) 10 mg tablet Take day of surgery.    aspirin (ECOTRIN LOW STRENGTH) 81 mg EC tablet Instructions provided by MD-Continue per protocol    famotidine (PEPCID) 20 mg tablet Take day of surgery.    fluocinonide (LIDEX) 0.05 % cream Uses PRN- DO NOT take day of surgery    loratadine (CLARITIN) 10 mg tablet Take day of surgery.    losartan (COZAAR) 100 MG tablet Stop taking 1 day prior to surgery. LD 6/17    mirtazapine (REMERON) 15 mg tablet Uses PRN- OK to take day of surgery    mycophenolate (CELLCEPT) 250 mg capsule Take day of surgery.    omeprazole (PriLOSEC) 40 MG capsule Take day of surgery.    pravastatin (PRAVACHOL) 40 mg tablet Take night before surgery    tacrolimus (PROGRAF) 0.5 mg capsule Take night before surgery    tacrolimus (PROGRAF) 1 mg capsule Take day of surgery.    tadalafil (CIALIS) 5 MG tablet Hold for 24 hours    tamsulosin (FLOMAX) 0.4 mg Take night before surgery   See Geriatric Assessment below...  Falls (last 6 months): NO  Marin Total Score: 23  PHQ- 9 Depression Scale: 1  Nutrition Assessment Score: 14  METS: 9.25  Health goals:  -What are your overall health goals? (quit smoking, wt. loss, rest, decrease stress)    Wants to get healthy and get every fixed     -What brings you strength? (family, friends, Shinto, health)    Family    -What activities are important to you? (exercise, reading, travel, work)    Exercise    Medication instructions for day of surgery reviewed. Please take all instructed medications with only a sip of water.       You will receive a call one business day prior to surgery with an arrival time and hospital directions. If your surgery is scheduled on a Monday, the hospital will be calling you on the Friday prior to your surgery. If you have not heard from anyone by 8pm, please call the hospital supervisor through the hospital  at [phone]. ([place] [phone] or [place]  [phone]).    Do not eat or drink anything after midnight the night before your surgery, including candy, mints, lifesavers, or chewing gum. Do not drink alcohol 24hrs before your surgery. Try not to smoke at least 24hrs before your surgery.       Follow the pre surgery showering instructions as listed in the “My Surgical Experience Booklet” or otherwise provided by your surgeon's office. Do not use a blade to shave the surgical area 1 week before surgery. It is okay to use a clean electric clippers up to 24 hours before surgery. Do not apply any lotions, creams, including makeup, cologne, deodorant, or perfumes after showering on the day of your surgery. Do not use dry shampoo, hair spray, hair gel, or any type of hair products.     No contact lenses, eye make-up, or artificial eyelashes. Remove nail polish, including gel polish, and any artificial, gel, or acrylic nails if possible. Remove all jewelry including rings and body piercing jewelry.     Wear causal clothing that is easy to take on and off. Consider your type of surgery.    Keep any valuables, jewelry, piercings at home. Please bring any specially ordered equipment (sling, braces) if indicated.    Arrange for a responsible person to drive you to and from the hospital on the day of your surgery. Please confirm the visitor policy for the day of your procedure when you receive your phone call with an arrival time.     Call the surgeon's office with any new illnesses, exposures, or additional questions prior to surgery.    Please reference your “My Surgical Experience Booklet” for additional information to prepare for your upcoming surgery.

## 2025-06-03 ENCOUNTER — APPOINTMENT (OUTPATIENT)
Dept: LAB | Facility: HOSPITAL | Age: 70
End: 2025-06-03
Payer: MEDICARE

## 2025-06-03 DIAGNOSIS — R97.20 BPH WITH ELEVATED PSA AND LOWER URINARY TRACT SYMPTOMS: ICD-10-CM

## 2025-06-03 DIAGNOSIS — Z01.812 PRE-OPERATIVE LABORATORY EXAMINATION: ICD-10-CM

## 2025-06-03 DIAGNOSIS — N40.1 BPH WITH ELEVATED PSA AND LOWER URINARY TRACT SYMPTOMS: ICD-10-CM

## 2025-06-03 DIAGNOSIS — Z01.810 PRE-OPERATIVE CARDIOVASCULAR EXAMINATION: ICD-10-CM

## 2025-06-03 DIAGNOSIS — Z79.01 LONG TERM (CURRENT) USE OF ANTICOAGULANTS: ICD-10-CM

## 2025-06-03 DIAGNOSIS — R39.89 SUSPECTED UTI: ICD-10-CM

## 2025-06-03 LAB
ABO GROUP BLD: NORMAL
ANION GAP SERPL CALCULATED.3IONS-SCNC: 6 MMOL/L (ref 4–13)
APTT PPP: 32 SECONDS (ref 23–34)
ATRIAL RATE: 95 BPM
BASOPHILS # BLD AUTO: 0.03 THOUSANDS/ÂΜL (ref 0–0.1)
BASOPHILS NFR BLD AUTO: 1 % (ref 0–1)
BLD GP AB SCN SERPL QL: NEGATIVE
BUN SERPL-MCNC: 32 MG/DL (ref 5–25)
CALCIUM SERPL-MCNC: 10 MG/DL (ref 8.4–10.2)
CHLORIDE SERPL-SCNC: 104 MMOL/L (ref 96–108)
CO2 SERPL-SCNC: 27 MMOL/L (ref 21–32)
CREAT SERPL-MCNC: 1.51 MG/DL (ref 0.6–1.3)
EOSINOPHIL # BLD AUTO: 0.06 THOUSAND/ÂΜL (ref 0–0.61)
EOSINOPHIL NFR BLD AUTO: 1 % (ref 0–6)
ERYTHROCYTE [DISTWIDTH] IN BLOOD BY AUTOMATED COUNT: 13.7 % (ref 11.6–15.1)
GFR SERPL CREATININE-BSD FRML MDRD: 46 ML/MIN/1.73SQ M
GLUCOSE P FAST SERPL-MCNC: 89 MG/DL (ref 65–99)
HCT VFR BLD AUTO: 44 % (ref 36.5–49.3)
HGB BLD-MCNC: 14.7 G/DL (ref 12–17)
IMM GRANULOCYTES # BLD AUTO: 0.02 THOUSAND/UL (ref 0–0.2)
IMM GRANULOCYTES NFR BLD AUTO: 0 % (ref 0–2)
INR PPP: 1.05 (ref 0.85–1.19)
LYMPHOCYTES # BLD AUTO: 1.26 THOUSANDS/ÂΜL (ref 0.6–4.47)
LYMPHOCYTES NFR BLD AUTO: 24 % (ref 14–44)
MCH RBC QN AUTO: 28.1 PG (ref 26.8–34.3)
MCHC RBC AUTO-ENTMCNC: 33.4 G/DL (ref 31.4–37.4)
MCV RBC AUTO: 84 FL (ref 82–98)
MONOCYTES # BLD AUTO: 0.51 THOUSAND/ÂΜL (ref 0.17–1.22)
MONOCYTES NFR BLD AUTO: 10 % (ref 4–12)
NEUTROPHILS # BLD AUTO: 3.28 THOUSANDS/ÂΜL (ref 1.85–7.62)
NEUTS SEG NFR BLD AUTO: 64 % (ref 43–75)
NRBC BLD AUTO-RTO: 0 /100 WBCS
P AXIS: 60 DEGREES
PLATELET # BLD AUTO: 127 THOUSANDS/UL (ref 149–390)
PMV BLD AUTO: 10.2 FL (ref 8.9–12.7)
POTASSIUM SERPL-SCNC: 5 MMOL/L (ref 3.5–5.3)
PR INTERVAL: 130 MS
PROTHROMBIN TIME: 13.9 SECONDS (ref 12.3–15)
QRS AXIS: 125 DEGREES
QRSD INTERVAL: 128 MS
QT INTERVAL: 366 MS
QTC INTERVAL: 461 MS
RBC # BLD AUTO: 5.24 MILLION/UL (ref 3.88–5.62)
RH BLD: POSITIVE
SODIUM SERPL-SCNC: 137 MMOL/L (ref 135–147)
SPECIMEN EXPIRATION DATE: NORMAL
T WAVE AXIS: 53 DEGREES
VENTRICULAR RATE: 95 BPM
WBC # BLD AUTO: 5.16 THOUSAND/UL (ref 4.31–10.16)

## 2025-06-03 PROCEDURE — 93010 ELECTROCARDIOGRAM REPORT: CPT | Performed by: INTERNAL MEDICINE

## 2025-06-03 PROCEDURE — 85610 PROTHROMBIN TIME: CPT

## 2025-06-03 PROCEDURE — 86901 BLOOD TYPING SEROLOGIC RH(D): CPT

## 2025-06-03 PROCEDURE — 86850 RBC ANTIBODY SCREEN: CPT

## 2025-06-03 PROCEDURE — 85025 COMPLETE CBC W/AUTO DIFF WBC: CPT

## 2025-06-03 PROCEDURE — 36415 COLL VENOUS BLD VENIPUNCTURE: CPT

## 2025-06-03 PROCEDURE — 80048 BASIC METABOLIC PNL TOTAL CA: CPT

## 2025-06-03 PROCEDURE — 87086 URINE CULTURE/COLONY COUNT: CPT

## 2025-06-03 PROCEDURE — 85730 THROMBOPLASTIN TIME PARTIAL: CPT

## 2025-06-03 PROCEDURE — 86900 BLOOD TYPING SEROLOGIC ABO: CPT

## 2025-06-04 ENCOUNTER — OFFICE VISIT (OUTPATIENT)
Dept: DERMATOLOGY | Facility: CLINIC | Age: 70
End: 2025-06-04
Payer: MEDICARE

## 2025-06-04 VITALS — WEIGHT: 150 LBS | TEMPERATURE: 98.7 F | BODY MASS INDEX: 24.96 KG/M2

## 2025-06-04 DIAGNOSIS — L82.1 SEBORRHEIC KERATOSIS: ICD-10-CM

## 2025-06-04 DIAGNOSIS — D22.71 MULTIPLE BENIGN MELANOCYTIC NEVI OF UPPER AND LOWER EXTREMITIES AND TRUNK: ICD-10-CM

## 2025-06-04 DIAGNOSIS — D22.5 MULTIPLE BENIGN MELANOCYTIC NEVI OF UPPER AND LOWER EXTREMITIES AND TRUNK: ICD-10-CM

## 2025-06-04 DIAGNOSIS — L57.0 ACTINIC KERATOSIS: ICD-10-CM

## 2025-06-04 DIAGNOSIS — D22.61 MULTIPLE BENIGN MELANOCYTIC NEVI OF UPPER AND LOWER EXTREMITIES AND TRUNK: ICD-10-CM

## 2025-06-04 DIAGNOSIS — D18.01 CHERRY ANGIOMA: Primary | ICD-10-CM

## 2025-06-04 DIAGNOSIS — D22.72 MULTIPLE BENIGN MELANOCYTIC NEVI OF UPPER AND LOWER EXTREMITIES AND TRUNK: ICD-10-CM

## 2025-06-04 DIAGNOSIS — L57.8 OTHER SKIN CHANGES DUE TO CHRONIC EXPOSURE TO NONIONIZING RADIATION: ICD-10-CM

## 2025-06-04 DIAGNOSIS — L81.4 SOLAR LENTIGO: ICD-10-CM

## 2025-06-04 DIAGNOSIS — D22.62 MULTIPLE BENIGN MELANOCYTIC NEVI OF UPPER AND LOWER EXTREMITIES AND TRUNK: ICD-10-CM

## 2025-06-04 LAB — BACTERIA UR CULT: NORMAL

## 2025-06-04 PROCEDURE — 17000 DESTRUCT PREMALG LESION: CPT | Performed by: DERMATOLOGY

## 2025-06-04 PROCEDURE — 99214 OFFICE O/P EST MOD 30 MIN: CPT | Performed by: DERMATOLOGY

## 2025-06-04 NOTE — PROGRESS NOTES
"[facility] Dermatology Clinic Note     Patient Name: Chel Rea  Encounter Date: 6/4/2025     Have you been cared for by a [facility] Dermatologist in the last 3 years and, if so, which description applies to you? Yes. I have been here within the last 3 years, and my medical history has NOT changed since that time. I am not of child-bearing potential.     REVIEW OF SYSTEMS:  Have you recently had or currently have any of the following? No changes in my recent health.   PAST MEDICAL HISTORY:  Have you personally ever had or currently have any of the following?  If \"YES,\" then please provide more detail. No changes in my medical history.   HISTORY OF IMMUNOSUPPRESSION: Do you have a history of any of the following:  Systemic Immunosuppression such as Diabetes, Biologic or Immunotherapy, Chemotherapy, Organ Transplantation, Bone Marrow Transplantation or Prednisone?  YES, heart transplant 7/7/2017     Answering \"YES\" requires the addition of the dotphrase \"IMMUNOSUPPRESSED\" as the first diagnosis of the patient's visit.   FAMILY HISTORY:  Any \"first degree relatives\" (parent, brother, sister, or child) with the following?    No changes in my family's known health.   PATIENT EXPERIENCE:    Do you want the Dermatologist to perform a COMPLETE skin exam today including a clinical examination under the \"bra and underwear\" areas?  Yes  If necessary, do we have your permission to call and leave a detailed message on your Preferred Phone number that includes your specific medical information?  Yes      Allergies[1] Current Medications[2]        Whom besides the patient is providing clinical information about today's encounter?   NO ADDITIONAL HISTORIAN (patient alone provided history)    Physical Exam and Assessment/Plan by Diagnosis:    Immunosuppressed    Main Reason for Immunosuppression:    Organ Transplantation:  heart    Additional History of Immunosuppression (please provide details):    Heart transplant " "7/7/2017    Plan:  This diagnosis of \"Immunosuppression (HCC) [ID:  [id number]\" should be added to the patient's permanent PROBLEM LIST  Patient requires full skin exams every year       HISTORY OF MELANOMA IN SITU      Physical Exam:  Anatomic Location Affected:  right flank  Morphological Description of Scar:  well healed   Year Treated: December 2023  Suspected Recurrence: no     Additional History of Present Condition:  Treated via excision on 12/01/2023     Assessment and Plan:  Based on a thorough discussion of this condition and the management approach to it (including a comprehensive discussion of the known risks, side effects and potential benefits of treatment), the patient (family) agrees to implement the following specific plan:  Continue to monitor.   Apply sunscreen SPF 30+, reapplying every two hours. Wear sun protective clothing, long sleeve shirts, wide brimmed hats, and sunglasses.   Follow up in 6 months.      What happens at follow-up?  The main purpose of follow-up is to detect recurrences early (metastatic melanoma), but it also offers an opportunity to diagnose a new primary melanoma at the first possible opportunity. A second invasive melanoma occurs in 5-10% of melanoma patients and a new melanoma in situ is diagnosed in more than 20% of melanoma patients.     Our practice makes the following recommendations for follow-up for patients with invasive melanoma.  At-least \"monthly\" self-skin examinations   Routine skin checks by a board certified dermatologist  Follow-up intervals are \"every 3 months\" within 2 years of a new melanoma diagnosis; \"every 6 months\" between 2-4 years of a new melanoma diagnosis; and \"annually\" after 4 years of a new melanoma diagnosis  Individual patient's needs should be considered before an appropriate follow-up is offered  Provide education and support to help the patient adjust to their illness     Follow-up appointments should include:  A check of the scar where the " primary melanoma was removed  Checking the regional lymph nodes  A general skin examination  A full physical examination at least annually by your primary care physician     In those with more advanced primary disease, follow-up may include:  Blood tests  Imaging: ultrasound, X-ray, CT, MRI and PET scan.     Most tests are not worthwhile for patients with stage 1 or 2 melanoma unless there are signs or symptoms of disease recurrence or metastasis. No tests are necessary for healthy patients who have remained well for five years or longer after removal of their melanoma.     What is the outlook for patients with melanoma?  Melanoma in situ is cured by excision because it has no potential to spread around the body.  The risk of spread and ultimate death from invasive melanoma depends on several factors, but the main one is the Breslow thickness of the melanoma at the time it was surgically removed.  Metastases are rare for melanomas < 0.75 mm and the risk for tumours 0.75-1 mm thick is about 5%. The risk steadily increases with thickness so that melanomas > 4 mm have a risk of metastasis of about 40%.     Melanoma is a potentially serious type of skin cancer, in which there is uncontrolled growth of melanocytes (pigment cells). Melanoma is sometimes called malignant melanoma.  Normal melanocytes are found in the basal layer of the epidermis (the outer layer of skin). Melanocytes produce a protein called melanin, which protects skin cells by absorbing ultraviolet (UV) radiation. Melanocytes are found in equal numbers in black and white skin, but melanocytes in black skin produce much more melanin. People with dark brown or black skin are very much less likely to be damaged by UV radiation than those with white skin.      ACTINIC KERATOSES    Physical Exam:  Anatomic Location Affected:  left lateral brow  Morphological Description:  Thin pink papule(s) with gritty scale       Assessment and Plan:  Based on a thorough  discussion of this condition and the management approach to it (including a comprehensive discussion of the known risks, side effects and potential benefits of treatment), the patient (family) agrees to implement the following specific plan:  Treated with cryotherapy today; written and verbal consent obtained     PROCEDURE:  DESTRUCTION OF PRE-MALIGNANT LESIONS  After a thorough discussion of treatment options and risk/benefits/alternatives (including but not limited to local pain, scarring, dyspigmentation, blistering, and possible superinfection), verbal and written consent were obtained and the aforementioned lesions were treated on with cryotherapy using liquid nitrogen x 2 cycles for 5-10 seconds. The patient tolerated the procedure well, and after-care instructions were provided.     TOTAL NUMBER of 1 pre-malignant lesions were treated today on the ANATOMIC LOCATION: left lateral brow.       Patient instructions:  Your pre-cancerous lesions (called actinic keratosis) were treated with liquid nitrogen today. The treated areas will get more red, crusted over the next few days. There might be some blistering. Apply vaseline to the treated area for the next week to help it heal fully. Do not pick at the area. Return in 3-4 weeks for another round of liquid nitrogen treatment if lesion(s)  fails to fully resolve.          ROGEL ANGIOMAS     Physical Exam:  Anatomic Location Affected:  Trunk and extremities  Morphological Description:  Scattered cherry red papules  Denies pain, itch, bleeding. No treatments tried. Present for years. Present constantly; no modifying factors which make it worse or better.     Assessment and Plan:  Based on a thorough discussion of this condition and the management approach to it (including a comprehensive discussion of the known risks, side effects and potential benefits of treatment), the patient (family) agrees to implement the following specific plan:  Reassure benign     "  SEBORRHEIC KERATOSIS; NON-INFLAMED     Physical Exam:  Anatomic Location Affected:  Trunk and extremities  Morphological Description:  Waxy, smooth to warty textured, yellow to brownish-grey to dark brown to blackish, discrete, \"stuck-on\" appearing papules.  Present for years. Denies pain, itch, bleeding.      Additional History of Present Condition:  Present constantly; no modifying factors which make it worse or better. No prior treatment.       Assessment and Plan:  Based on a thorough discussion of this condition and the management approach to it (including a comprehensive discussion of the known risks, side effects and potential benefits of treatment), the patient (family) agrees to implement the following specific plan:  Reassure benign  Use sun protection.  Apply SPF 30 or higher at least three times a day.  Wear sun protecting clothing and hats.      SOLAR LENTIGINES   OTHER SKIN CHANGES DUE TO CHRONIC EXPOSURE TO NONIONIZING RADIATION     Physical Exam:  Anatomic Location Affected:  Sun exposed areas of back, chest, arms, legs  Morphological Description:  Multiple scattered brown to tan evenly pigmented macules   Denies pain, itch, bleeding. No treatments tried. Present for months - years. Reports getting newer lesions with sun exposure.       Assessment and Plan:  Based on a thorough discussion of this condition and the management approach to it (including a comprehensive discussion of the known risks, side effects and potential benefits of treatment), the patient (family) agrees to implement the following specific plan:  Reassure benign  Use sun protection.  Apply SPF 30 or higher at least three times a day.  Wear sun protecting clothing and hats.       MULTIPLE MELANOCYTIC NEVI (\"Moles\")     Physical Exam:  Anatomic Location Affected: Trunk and extremities  Morphological Description:  Scattered, round to ovoid, symmetrical-appearing, even bordered, skin colored to dark brown macules/papules  Denies " pain, itch, bleeding. No treatments tried. Present for years. Present constantly; no modifying factors which make it worse or better. Denies actively changing or growing moles.      Assessment and Plan:  Based on a thorough discussion of this condition and the management approach to it (including a comprehensive discussion of the known risks, side effects and potential benefits of treatment), the patient (family) agrees to implement the following specific plan:  Reassure benign  Monitor for changes  Use sun protection.  Apply SPF 30 or higher at least three times a day.  Wear sun protecting clothing and hats.      Worrisome signs of skin malignancy discussed, questions answered. Regular self-skin check discussed. Advised to call or return to office if patient notices any spots of concern, rapidly growing/changing lesions, bleeding lesions, non-healing lesions. Advised regular SPF use.        Scribe Attestation      I,:  Jaclyn Prince MA am acting as a scribe while in the presence of the attending physician.:       I,:  Altaf Hugo MD personally performed the services described in this documentation    as scribed in my presence.:                  [1]   Allergies  Allergen Reactions    Oxycodone-Acetaminophen Hives     Other reaction(s): rash    Codeine Hives     URTICARIA   Other reaction(s): rash and hives   [2]   Current Outpatient Medications:     amLODIPine (NORVASC) 10 mg tablet, Take 1 tablet (10 mg total) by mouth daily, Disp: 90 tablet, Rfl: 3    aspirin (ECOTRIN LOW STRENGTH) 81 mg EC tablet, Take 81 mg by mouth in the morning., Disp: , Rfl:     famotidine (PEPCID) 20 mg tablet, Take 40 mg by mouth in the morning. Then 20 mg as needed HS., Disp: , Rfl:     fluocinonide (LIDEX) 0.05 % cream, Use 2 times a day as needed to affected areas, Disp: 60 g, Rfl: 2    loratadine (CLARITIN) 10 mg tablet, Take 10 mg by mouth in the morning., Disp: , Rfl:     losartan (COZAAR) 100 MG tablet, Take 100 mg by mouth in  the morning., Disp: , Rfl:     mirtazapine (REMERON) 15 mg tablet, Take 15 mg by mouth daily as needed (as needed), Disp: , Rfl:     mycophenolate (CELLCEPT) 250 mg capsule, , Disp: , Rfl:     omeprazole (PriLOSEC) 40 MG capsule, Take 1 capsule (40 mg total) by mouth daily, Disp: 30 capsule, Rfl: 2    pravastatin (PRAVACHOL) 40 mg tablet, every evening, Disp: , Rfl:     tacrolimus (PROGRAF) 0.5 mg capsule, Take 0.5 mg by mouth daily at bedtime, Disp: , Rfl:     tacrolimus (PROGRAF) 1 mg capsule, Take 2 mg by mouth in the morning and 2 mg in the evening., Disp: , Rfl:     tadalafil (CIALIS) 5 MG tablet, TAKE 1 TABLET BY MOUTH DAILY, Disp: 30 tablet, Rfl: 5    tamsulosin (FLOMAX) 0.4 mg, Take 1 capsule (0.4 mg total) by mouth daily with dinner, Disp: 90 capsule, Rfl: 3

## 2025-06-05 ENCOUNTER — HOSPITAL ENCOUNTER (OUTPATIENT)
Dept: NON INVASIVE DIAGNOSTICS | Facility: HOSPITAL | Age: 70
Discharge: HOME | End: 2025-06-05
Attending: INTERNAL MEDICINE
Payer: MEDICARE

## 2025-06-05 VITALS
DIASTOLIC BLOOD PRESSURE: 80 MMHG | HEART RATE: 96 BPM | HEIGHT: 65 IN | WEIGHT: 150 LBS | BODY MASS INDEX: 24.99 KG/M2 | SYSTOLIC BLOOD PRESSURE: 128 MMHG

## 2025-06-05 DIAGNOSIS — Z94.1 HEART TRANSPLANT, HETEROTOPIC, STATUS (HCC): ICD-10-CM

## 2025-06-05 DIAGNOSIS — Z94.1 HISTORY OF HEART TRANSPLANT (HCC): ICD-10-CM

## 2025-06-05 LAB
AORTIC ROOT: 2.4 CM
ASCENDING AORTA: 3.4 CM
BSA FOR ECHO PROCEDURE: 1.75 M2
FRACTIONAL SHORTENING: 40 (ref 28–44)
INTERVENTRICULAR SEPTUM IN DIASTOLE (PARASTERNAL SHORT AXIS VIEW): 0.9 CM
INTERVENTRICULAR SEPTUM: 0.9 CM (ref 0.6–1.1)
IVC: 20 MM
LAAS-AP2: 19.3 CM2
LAAS-AP4: 19.4 CM2
LEFT ATRIUM SIZE: 3.8 CM
LEFT ATRIUM VOLUME (MOD BIPLANE): 50 ML
LEFT ATRIUM VOLUME INDEX (MOD BIPLANE): 28.6 ML/M2
LEFT INTERNAL DIMENSION IN SYSTOLE: 2.5 CM (ref 2.1–4)
LEFT VENTRICLE DIASTOLIC VOLUME (MOD BIPLANE): 64 ML
LEFT VENTRICLE DIASTOLIC VOLUME INDEX (MOD BIPLANE): 36.6 ML/M2
LEFT VENTRICLE SYSTOLIC VOLUME (MOD BIPLANE): 30 ML
LEFT VENTRICLE SYSTOLIC VOLUME INDEX (MOD BIPLANE): 17.1 ML/M2
LEFT VENTRICULAR INTERNAL DIMENSION IN DIASTOLE: 4.2 CM (ref 3.5–6)
LEFT VENTRICULAR POSTERIOR WALL IN END DIASTOLE: 0.8 CM
LEFT VENTRICULAR STROKE VOLUME: 58 ML
LV EF BIPLANE MOD: 54 %
LV EF US.2D.A4C+ESTIMATED: 60 %
LVSV (TEICH): 58 ML
MV E'TISSUE VEL-SEP: 13 CM/S
RA PRESSURE ESTIMATED: 5 MMHG
RIGHT ATRIUM AREA SYSTOLE A4C: 13 CM2
RIGHT VENTRICLE ID DIMENSION: 3 CM
RV PSP: 21 MMHG
SL CV LEFT ATRIUM LENGTH A2C: 6 CM
SL CV LV EF: 61
SL CV PED ECHO LEFT VENTRICLE DIASTOLIC VOLUME (MOD BIPLANE) 2D: 81 ML
SL CV PED ECHO LEFT VENTRICLE SYSTOLIC VOLUME (MOD BIPLANE) 2D: 23 ML
TR MAX PG: 16 MMHG
TR PEAK VELOCITY: 2 M/S
TRICUSPID ANNULAR PLANE SYSTOLIC EXCURSION: 2 CM
TRICUSPID VALVE PEAK REGURGITATION VELOCITY: 1.99 M/S

## 2025-06-05 PROCEDURE — 93306 TTE W/DOPPLER COMPLETE: CPT

## 2025-06-05 PROCEDURE — 93306 TTE W/DOPPLER COMPLETE: CPT | Performed by: INTERNAL MEDICINE

## 2025-06-09 ENCOUNTER — ANESTHESIA EVENT (OUTPATIENT)
Dept: PERIOP | Facility: HOSPITAL | Age: 70
End: 2025-06-09
Payer: MEDICARE

## 2025-06-09 ENCOUNTER — TELEPHONE (OUTPATIENT)
Age: 70
End: 2025-06-09

## 2025-06-09 NOTE — TELEPHONE ENCOUNTER
Caller: Lindsay (  Urology)     Doctor: Dr. Junior    Reason for call: Pt was seen on 5/1/ for cardiac clearance and Lindsay called from Urology and stated that pt's note does not say pt is cleared for upcoming surgery on 6/19/25. (PROSTATECTOM ) Lindsay would like to know if note can be updated in chart.     Call back#: [phone]

## 2025-06-18 ENCOUNTER — PREP FOR PROCEDURE (OUTPATIENT)
Dept: UROLOGY | Facility: AMBULATORY SURGERY CENTER | Age: 70
End: 2025-06-18

## 2025-06-18 ENCOUNTER — TELEPHONE (OUTPATIENT)
Age: 70
End: 2025-06-18

## 2025-06-19 ENCOUNTER — HOSPITAL ENCOUNTER (OUTPATIENT)
Facility: HOSPITAL | Age: 70
Setting detail: OUTPATIENT SURGERY
Discharge: HOME | End: 2025-06-20
Payer: MEDICARE

## 2025-06-19 ENCOUNTER — ANESTHESIA (OUTPATIENT)
Dept: PERIOP | Facility: HOSPITAL | Age: 70
End: 2025-06-19
Payer: MEDICARE

## 2025-06-19 DIAGNOSIS — N18.31 STAGE 3A CHRONIC KIDNEY DISEASE (HCC): ICD-10-CM

## 2025-06-19 DIAGNOSIS — N40.1 BPH WITH ELEVATED PSA AND LOWER URINARY TRACT SYMPTOMS: Primary | ICD-10-CM

## 2025-06-19 DIAGNOSIS — R97.20 BPH WITH ELEVATED PSA AND LOWER URINARY TRACT SYMPTOMS: Primary | ICD-10-CM

## 2025-06-19 LAB
ABO GROUP BLD: NORMAL
RH BLD: POSITIVE

## 2025-06-19 PROCEDURE — 88342 IMHCHEM/IMCYTCHM 1ST ANTB: CPT | Performed by: PATHOLOGY

## 2025-06-19 PROCEDURE — 88341 IMHCHEM/IMCYTCHM EA ADD ANTB: CPT | Performed by: PATHOLOGY

## 2025-06-19 PROCEDURE — 55867 LAPS SURG PRST8ECT SMPL STOT: CPT | Performed by: PHYSICIAN ASSISTANT

## 2025-06-19 PROCEDURE — NC001 PR NO CHARGE

## 2025-06-19 PROCEDURE — NC001 PR NO CHARGE: Performed by: PHYSICIAN ASSISTANT

## 2025-06-19 PROCEDURE — 88344 IMHCHEM/IMCYTCHM EA MLT ANTB: CPT | Performed by: PATHOLOGY

## 2025-06-19 PROCEDURE — 55867 LAPS SURG PRST8ECT SMPL STOT: CPT

## 2025-06-19 PROCEDURE — 86923 COMPATIBILITY TEST ELECTRIC: CPT

## 2025-06-19 PROCEDURE — 88307 TISSUE EXAM BY PATHOLOGIST: CPT | Performed by: PATHOLOGY

## 2025-06-19 RX ORDER — DEXAMETHASONE SODIUM PHOSPHATE 10 MG/ML
INJECTION, SOLUTION INTRAMUSCULAR; INTRAVENOUS AS NEEDED
Status: DISCONTINUED | OUTPATIENT
Start: 2025-06-19 | End: 2025-06-19

## 2025-06-19 RX ORDER — SODIUM CHLORIDE, SODIUM LACTATE, POTASSIUM CHLORIDE, CALCIUM CHLORIDE 600; 310; 30; 20 MG/100ML; MG/100ML; MG/100ML; MG/100ML
20 INJECTION, SOLUTION INTRAVENOUS CONTINUOUS
Status: DISCONTINUED | OUTPATIENT
Start: 2025-06-19 | End: 2025-06-19 | Stop reason: SDUPTHER

## 2025-06-19 RX ORDER — PANTOPRAZOLE SODIUM 40 MG/1
40 TABLET, DELAYED RELEASE ORAL
Status: DISCONTINUED | OUTPATIENT
Start: 2025-06-20 | End: 2025-06-20 | Stop reason: HOSPADM

## 2025-06-19 RX ORDER — ROCURONIUM BROMIDE 10 MG/ML
INJECTION, SOLUTION INTRAVENOUS AS NEEDED
Status: DISCONTINUED | OUTPATIENT
Start: 2025-06-19 | End: 2025-06-19

## 2025-06-19 RX ORDER — SODIUM CHLORIDE, SODIUM LACTATE, POTASSIUM CHLORIDE, CALCIUM CHLORIDE 600; 310; 30; 20 MG/100ML; MG/100ML; MG/100ML; MG/100ML
125 INJECTION, SOLUTION INTRAVENOUS CONTINUOUS
Status: DISCONTINUED | OUTPATIENT
Start: 2025-06-19 | End: 2025-06-19 | Stop reason: SDUPTHER

## 2025-06-19 RX ORDER — LOSARTAN POTASSIUM 50 MG/1
100 TABLET ORAL DAILY
Status: DISCONTINUED | OUTPATIENT
Start: 2025-06-20 | End: 2025-06-20 | Stop reason: HOSPADM

## 2025-06-19 RX ORDER — CEFAZOLIN SODIUM 2 G/50ML
2000 SOLUTION INTRAVENOUS ONCE
Status: COMPLETED | OUTPATIENT
Start: 2025-06-19 | End: 2025-06-19

## 2025-06-19 RX ORDER — LIDOCAINE HYDROCHLORIDE 20 MG/ML
INJECTION, SOLUTION EPIDURAL; INFILTRATION; INTRACAUDAL; PERINEURAL AS NEEDED
Status: DISCONTINUED | OUTPATIENT
Start: 2025-06-19 | End: 2025-06-19

## 2025-06-19 RX ORDER — TADALAFIL 5 MG/1
5 TABLET ORAL DAILY
Status: DISCONTINUED | OUTPATIENT
Start: 2025-06-20 | End: 2025-06-20 | Stop reason: HOSPADM

## 2025-06-19 RX ORDER — PRAVASTATIN SODIUM 40 MG
40 TABLET ORAL
Status: DISCONTINUED | OUTPATIENT
Start: 2025-06-19 | End: 2025-06-20 | Stop reason: HOSPADM

## 2025-06-19 RX ORDER — HYDROMORPHONE HCL/PF 1 MG/ML
0.5 SYRINGE (ML) INJECTION
Status: DISCONTINUED | OUTPATIENT
Start: 2025-06-19 | End: 2025-06-19 | Stop reason: HOSPADM

## 2025-06-19 RX ORDER — TAMSULOSIN HYDROCHLORIDE 0.4 MG/1
0.4 CAPSULE ORAL
Status: DISCONTINUED | OUTPATIENT
Start: 2025-06-19 | End: 2025-06-20 | Stop reason: HOSPADM

## 2025-06-19 RX ORDER — BUPIVACAINE HYDROCHLORIDE 5 MG/ML
INJECTION, SOLUTION EPIDURAL; INTRACAUDAL; PERINEURAL AS NEEDED
Status: DISCONTINUED | OUTPATIENT
Start: 2025-06-19 | End: 2025-06-19 | Stop reason: HOSPADM

## 2025-06-19 RX ORDER — CALCIUM CARBONATE 500 MG/1
1000 TABLET, CHEWABLE ORAL DAILY PRN
Status: DISCONTINUED | OUTPATIENT
Start: 2025-06-19 | End: 2025-06-20 | Stop reason: HOSPADM

## 2025-06-19 RX ORDER — PROPOFOL 10 MG/ML
INJECTION, EMULSION INTRAVENOUS AS NEEDED
Status: DISCONTINUED | OUTPATIENT
Start: 2025-06-19 | End: 2025-06-19

## 2025-06-19 RX ORDER — AMLODIPINE BESYLATE 10 MG/1
10 TABLET ORAL DAILY
Status: DISCONTINUED | OUTPATIENT
Start: 2025-06-20 | End: 2025-06-20 | Stop reason: HOSPADM

## 2025-06-19 RX ORDER — GABAPENTIN 100 MG/1
100 CAPSULE ORAL 3 TIMES DAILY
Status: DISCONTINUED | OUTPATIENT
Start: 2025-06-19 | End: 2025-06-20 | Stop reason: HOSPADM

## 2025-06-19 RX ORDER — SODIUM CHLORIDE, SODIUM LACTATE, POTASSIUM CHLORIDE, CALCIUM CHLORIDE 600; 310; 30; 20 MG/100ML; MG/100ML; MG/100ML; MG/100ML
125 INJECTION, SOLUTION INTRAVENOUS CONTINUOUS
Status: DISCONTINUED | OUTPATIENT
Start: 2025-06-19 | End: 2025-06-20 | Stop reason: HOSPADM

## 2025-06-19 RX ORDER — HEPARIN SODIUM 5000 [USP'U]/ML
5000 INJECTION, SOLUTION INTRAVENOUS; SUBCUTANEOUS EVERY 8 HOURS SCHEDULED
Status: DISCONTINUED | OUTPATIENT
Start: 2025-06-19 | End: 2025-06-20 | Stop reason: HOSPADM

## 2025-06-19 RX ORDER — TACROLIMUS 0.5 MG/1
0.5 CAPSULE ORAL
Status: DISCONTINUED | OUTPATIENT
Start: 2025-06-19 | End: 2025-06-19 | Stop reason: SDUPTHER

## 2025-06-19 RX ORDER — ONDANSETRON 2 MG/ML
4 INJECTION INTRAMUSCULAR; INTRAVENOUS ONCE AS NEEDED
Status: COMPLETED | OUTPATIENT
Start: 2025-06-19 | End: 2025-06-19

## 2025-06-19 RX ORDER — ACETAMINOPHEN 325 MG/1
975 TABLET ORAL EVERY 6 HOURS SCHEDULED
Status: DISCONTINUED | OUTPATIENT
Start: 2025-06-19 | End: 2025-06-20 | Stop reason: HOSPADM

## 2025-06-19 RX ORDER — HEPARIN SODIUM 5000 [USP'U]/ML
5000 INJECTION, SOLUTION INTRAVENOUS; SUBCUTANEOUS ONCE
Status: COMPLETED | OUTPATIENT
Start: 2025-06-19 | End: 2025-06-19

## 2025-06-19 RX ORDER — MIDAZOLAM HYDROCHLORIDE 2 MG/2ML
INJECTION, SOLUTION INTRAMUSCULAR; INTRAVENOUS AS NEEDED
Status: DISCONTINUED | OUTPATIENT
Start: 2025-06-19 | End: 2025-06-19

## 2025-06-19 RX ORDER — TACROLIMUS 1 MG/1
2 CAPSULE ORAL EVERY MORNING
Status: DISCONTINUED | OUTPATIENT
Start: 2025-06-20 | End: 2025-06-20 | Stop reason: HOSPADM

## 2025-06-19 RX ORDER — MAGNESIUM HYDROXIDE/ALUMINUM HYDROXICE/SIMETHICONE 120; 1200; 1200 MG/30ML; MG/30ML; MG/30ML
30 SUSPENSION ORAL EVERY 6 HOURS PRN
Status: DISCONTINUED | OUTPATIENT
Start: 2025-06-19 | End: 2025-06-20 | Stop reason: HOSPADM

## 2025-06-19 RX ORDER — HYDROMORPHONE HCL/PF 1 MG/ML
SYRINGE (ML) INJECTION AS NEEDED
Status: DISCONTINUED | OUTPATIENT
Start: 2025-06-19 | End: 2025-06-19

## 2025-06-19 RX ORDER — PROMETHAZINE HYDROCHLORIDE 25 MG/ML
12.5 INJECTION, SOLUTION INTRAMUSCULAR; INTRAVENOUS EVERY 6 HOURS PRN
Status: DISCONTINUED | OUTPATIENT
Start: 2025-06-19 | End: 2025-06-20 | Stop reason: HOSPADM

## 2025-06-19 RX ORDER — ONDANSETRON 2 MG/ML
4 INJECTION INTRAMUSCULAR; INTRAVENOUS EVERY 6 HOURS PRN
Status: DISCONTINUED | OUTPATIENT
Start: 2025-06-19 | End: 2025-06-20 | Stop reason: HOSPADM

## 2025-06-19 RX ORDER — FENTANYL CITRATE 50 UG/ML
INJECTION, SOLUTION INTRAMUSCULAR; INTRAVENOUS AS NEEDED
Status: DISCONTINUED | OUTPATIENT
Start: 2025-06-19 | End: 2025-06-19

## 2025-06-19 RX ORDER — MYCOPHENOLATE MOFETIL 250 MG/1
500 CAPSULE ORAL EVERY 12 HOURS SCHEDULED
Status: DISCONTINUED | OUTPATIENT
Start: 2025-06-19 | End: 2025-06-20 | Stop reason: HOSPADM

## 2025-06-19 RX ORDER — LORATADINE 10 MG/1
10 TABLET ORAL DAILY
Status: DISCONTINUED | OUTPATIENT
Start: 2025-06-20 | End: 2025-06-20 | Stop reason: HOSPADM

## 2025-06-19 RX ORDER — FENTANYL CITRATE/PF 50 MCG/ML
25 SYRINGE (ML) INJECTION
Status: DISCONTINUED | OUTPATIENT
Start: 2025-06-19 | End: 2025-06-19 | Stop reason: HOSPADM

## 2025-06-19 RX ORDER — SENNOSIDES 8.6 MG
1 TABLET ORAL DAILY
Status: DISCONTINUED | OUTPATIENT
Start: 2025-06-20 | End: 2025-06-20 | Stop reason: HOSPADM

## 2025-06-19 RX ORDER — TACROLIMUS 1 MG/1
2 CAPSULE ORAL 2 TIMES DAILY
Status: DISCONTINUED | OUTPATIENT
Start: 2025-06-19 | End: 2025-06-19

## 2025-06-19 RX ORDER — OXYBUTYNIN CHLORIDE 5 MG/1
5 TABLET, EXTENDED RELEASE ORAL DAILY
Status: DISCONTINUED | OUTPATIENT
Start: 2025-06-20 | End: 2025-06-20 | Stop reason: HOSPADM

## 2025-06-19 RX ORDER — OXYCODONE HYDROCHLORIDE 5 MG/1
5 TABLET ORAL EVERY 4 HOURS PRN
Status: DISCONTINUED | OUTPATIENT
Start: 2025-06-19 | End: 2025-06-20 | Stop reason: HOSPADM

## 2025-06-19 RX ORDER — ASPIRIN 81 MG/1
81 TABLET ORAL DAILY
Status: DISCONTINUED | OUTPATIENT
Start: 2025-06-20 | End: 2025-06-20 | Stop reason: HOSPADM

## 2025-06-19 RX ORDER — KETOROLAC TROMETHAMINE 30 MG/ML
7.5 INJECTION, SOLUTION INTRAMUSCULAR; INTRAVENOUS EVERY 6 HOURS SCHEDULED
Status: DISCONTINUED | OUTPATIENT
Start: 2025-06-19 | End: 2025-06-20 | Stop reason: HOSPADM

## 2025-06-19 RX ORDER — MAGNESIUM HYDROXIDE 1200 MG/15ML
LIQUID ORAL AS NEEDED
Status: DISCONTINUED | OUTPATIENT
Start: 2025-06-19 | End: 2025-06-19 | Stop reason: HOSPADM

## 2025-06-19 RX ADMIN — MYCOPHENOLATE MOFETIL 500 MG: 250 CAPSULE ORAL at 19:35

## 2025-06-19 RX ADMIN — SODIUM CHLORIDE, SODIUM LACTATE, POTASSIUM CHLORIDE, AND CALCIUM CHLORIDE: .6; .31; .03; .02 INJECTION, SOLUTION INTRAVENOUS at 12:47

## 2025-06-19 RX ADMIN — GABAPENTIN 100 MG: 100 CAPSULE ORAL at 17:49

## 2025-06-19 RX ADMIN — DEXMEDETOMIDINE 20 MCG: 100 INJECTION, SOLUTION, CONCENTRATE INTRAVENOUS at 13:22

## 2025-06-19 RX ADMIN — CEFAZOLIN SODIUM 2000 MG: 2 SOLUTION INTRAVENOUS at 12:25

## 2025-06-19 RX ADMIN — SUGAMMADEX 200 MG: 100 INJECTION, SOLUTION INTRAVENOUS at 13:03

## 2025-06-19 RX ADMIN — SODIUM CHLORIDE, SODIUM LACTATE, POTASSIUM CHLORIDE, AND CALCIUM CHLORIDE 125 ML/HR: .6; .31; .03; .02 INJECTION, SOLUTION INTRAVENOUS at 10:50

## 2025-06-19 RX ADMIN — TAMSULOSIN HYDROCHLORIDE 0.4 MG: 0.4 CAPSULE ORAL at 21:08

## 2025-06-19 RX ADMIN — ROCURONIUM 60 MG: 50 INJECTION, SOLUTION INTRAVENOUS at 12:30

## 2025-06-19 RX ADMIN — FENTANYL CITRATE 100 MCG: 50 INJECTION INTRAMUSCULAR; INTRAVENOUS at 12:27

## 2025-06-19 RX ADMIN — OXYCODONE HYDROCHLORIDE 5 MG: 5 TABLET ORAL at 19:35

## 2025-06-19 RX ADMIN — KETOROLAC TROMETHAMINE 7.5 MG: 30 INJECTION, SOLUTION INTRAMUSCULAR; INTRAVENOUS at 23:31

## 2025-06-19 RX ADMIN — ONDANSETRON 4 MG: 2 INJECTION INTRAMUSCULAR; INTRAVENOUS at 16:39

## 2025-06-19 RX ADMIN — ROCURONIUM 20 MG: 50 INJECTION, SOLUTION INTRAVENOUS at 14:32

## 2025-06-19 RX ADMIN — HEPARIN SODIUM 5000 UNITS: 5000 INJECTION INTRAVENOUS; SUBCUTANEOUS at 21:08

## 2025-06-19 RX ADMIN — PROMETHAZINE HYDROCHLORIDE 12.5 MG: 25 INJECTION INTRAMUSCULAR; INTRAVENOUS at 19:38

## 2025-06-19 RX ADMIN — HYDROMORPHONE HYDROCHLORIDE 0.5 MG: 1 INJECTION, SOLUTION INTRAMUSCULAR; INTRAVENOUS; SUBCUTANEOUS at 15:55

## 2025-06-19 RX ADMIN — SODIUM CHLORIDE, SODIUM LACTATE, POTASSIUM CHLORIDE, AND CALCIUM CHLORIDE 125 ML/HR: .6; .31; .03; .02 INJECTION, SOLUTION INTRAVENOUS at 19:03

## 2025-06-19 RX ADMIN — KETOROLAC TROMETHAMINE 7.5 MG: 30 INJECTION, SOLUTION INTRAMUSCULAR; INTRAVENOUS at 17:49

## 2025-06-19 RX ADMIN — HYDROMORPHONE HYDROCHLORIDE 0.5 MG: 1 INJECTION, SOLUTION INTRAMUSCULAR; INTRAVENOUS; SUBCUTANEOUS at 12:47

## 2025-06-19 RX ADMIN — MIDAZOLAM HYDROCHLORIDE 2 MG: 1 INJECTION, SOLUTION INTRAMUSCULAR; INTRAVENOUS at 12:24

## 2025-06-19 RX ADMIN — PRAVASTATIN SODIUM 40 MG: 40 TABLET ORAL at 21:08

## 2025-06-19 RX ADMIN — ACETAMINOPHEN 975 MG: 325 TABLET, FILM COATED ORAL at 23:31

## 2025-06-19 RX ADMIN — PROPOFOL 150 MG: 10 INJECTION, EMULSION INTRAVENOUS at 12:30

## 2025-06-19 RX ADMIN — DEXAMETHASONE SODIUM PHOSPHATE 10 MG: 10 INJECTION, SOLUTION INTRAMUSCULAR; INTRAVENOUS at 12:37

## 2025-06-19 RX ADMIN — ROCURONIUM 10 MG: 50 INJECTION, SOLUTION INTRAVENOUS at 15:15

## 2025-06-19 RX ADMIN — LIDOCAINE HYDROCHLORIDE 80 MG: 20 INJECTION, SOLUTION EPIDURAL; INFILTRATION; INTRACAUDAL at 12:30

## 2025-06-19 RX ADMIN — DEXMEDETOMIDINE 20 MCG: 100 INJECTION, SOLUTION, CONCENTRATE INTRAVENOUS at 13:19

## 2025-06-19 RX ADMIN — SODIUM CHLORIDE, SODIUM LACTATE, POTASSIUM CHLORIDE, AND CALCIUM CHLORIDE 1000 ML: .6; .31; .03; .02 INJECTION, SOLUTION INTRAVENOUS at 17:47

## 2025-06-19 RX ADMIN — HEPARIN SODIUM 5000 UNITS: 5000 INJECTION INTRAVENOUS; SUBCUTANEOUS at 10:43

## 2025-06-19 RX ADMIN — SODIUM CHLORIDE, SODIUM LACTATE, POTASSIUM CHLORIDE, AND CALCIUM CHLORIDE: .6; .31; .03; .02 INJECTION, SOLUTION INTRAVENOUS at 15:40

## 2025-06-19 RX ADMIN — TACROLIMUS 2.5 MG: 1 CAPSULE ORAL at 21:08

## 2025-06-19 RX ADMIN — ACETAMINOPHEN 975 MG: 325 TABLET, FILM COATED ORAL at 17:49

## 2025-06-19 RX ADMIN — ROCURONIUM 20 MG: 50 INJECTION, SOLUTION INTRAVENOUS at 13:51

## 2025-06-19 NOTE — OP NOTE
"PATIENT:  Chel Rea (MRN [id number])    DATE OF PROCEDURE:   6/19/2025    PRE-OP DIAGNOSES:   1) Benign prostate hyperplasia with lower urinary tract symptoms  2) Urinary retention    POST-OP DIAGNOSES AND OPERATIVE FINDINGS:   1) Benign prostate hyperplasia with lower urinary tract symptoms   2) Urinary retention    PROCEDURES:  1. Robotic-assisted laparoscopic simple prostatectomy (subtotal), posterior cystotomy approach (50612)  2. Reanastomosis and reconstruction of 360 degrees of bladder neck to posterior urethra (38885)  3. Flexible cystoscopy    SURGEON:  Nima Wolfe MD    ASSISTANTS:  Surgeons and Role:     * Nima Wolfe MD - Primary     * Beni Serna PA-C - Assisting  Circulator: Sienna Tariq RN  Relief Circulator: Ilana Villar RN  Relief Scrub: Sheron Chamorro RN; Geovanna Looc RN  Scrub Person: Adin Ventura RN    NOTE:  There were no qualified teaching residents to assist with this case    ANESTHESIA TYPE:  General anesthesia    ESTIMATED BLOOD LOSS: 600 mL  URINE OUTPUT: Unable to assess     DRAINS:  20Fr russo catheter to gravity drainage    COMPLICATIONS:   None    SPECIMENS:   ID Type Source Tests Collected by Time Destination   1 : PROSTATE ADENOMA Tissue Prostate TISSUE EXAM Nima Wolfe MD 6/19/2025 1335         ANTIBIOTICS:  Cefazolin    INTRAOPERATIVE THROMBOEMBOLISM PROPHYLAXIS:  Pneumatic compression stockings   5000 units subcutaneous heparin    PATIENT CHARACTERISTICS  Age: 69 y.o.   ECOG score:  0  PSA: 30  Biopsy Results: Negative (Year: 12/2024)  Prostate size: 271.7 cc (Imaging Modality: MRI)  Prostatic Median Lobe: no  Pre-Operative Urine Culture: negative  Catheter on Day of Surgery: no  BMI: Body mass index is 24.73 kg/m².   Height: 5' 5\" (1.651 m)   Weight: 67.4 kg (148 lb 9.4 oz)    FINDINGS  Cystourethroscopy demonstrated a large obstructing prostate. An intravesical median lobe was not present. Urethra was free of lesions. Bladder was free of any lesions, mass, " or concerning urothelial change. Bilateral ureteral orifices in normal orthotopic position.  Bilateral orifices examined after 360 degree vesicourethral anastomosis and not involved.    Excellent hemostasis obtained  Leak test of the posterior cystotomy revealed no evidence of leak.    INDICATIONS FOR SURGERY  Chel Rea is a 69 y.o. male with a history of BPH with lower urinary tract symptoms, chronic urinary retention, and chronically elevated PSA. Prior to coming to the operating room, his treatment options were discussed. He elected for a robot-assisted laparoscopic simple prostatectomy. The risks of this procedure were discussed with the patient. He agreed to proceed signing the surgical consent.    POSITIONING:   The patient was placed supine position with adequate padding and careful consideration of all pressure points, including the shoulders, back, legs and arms. He was placed on a Pink Pad and arms were tucked with appropriate padding of the elbows and wrists. He was then shaved, prepped, and draped in the standard fashion. Antibiotics and 5000 units of subcutaneous heparin were given for infection and DVT prophylaxis respectively.    CYSTOURETHROSCOPY:    To commence the procedure, a flexible cystoscope was introduced into the urethra and advanced into the bladder.  Complete cystoscopy was preformed.  The ureteral orifices were seen in the orthotopic position.  There were no masses seen within the bladder. The cystoscope was then removed, examining the urethra, which was normal in appearance.      An 18 Fr russo catheter was then inserted with 10 cc of water placed in the balloon after return of urine.    ACCESS:    After performing the required time out and correctly identifying the patient, procedure to be performed, a Veress needle was placed through a periumbilical puncture and a pneumoperitoneum was created. This was first set to 20 mm Hg during port placement and was thereafter lowered to 12  mmHg for the remainder of the procedure.  The patient was then placed in steep Trendelenburg position at 27 degrees. An 8mm robotic port was placed with a visual obturator and laparoscope just superior to the umbilicus for the scope. Under vision, a 12 mm assistant port was placed 3 fingerbreadths cranial to the right iliac crest anterior to the cecum. A right robotic instrument arm 8 mm port was placed just lateral to the rectus, just caudal to the level of the umbilicus. A 5 mm Airseal assistant port was then placed between the camera port and the last port, just cranial to the umbilicus. Two 8 mm robotic instrument arms were placed on the left side of the abdomen at the level of the umbilicus to mirror the right-sided 12 mm and 8 mm ports. After successful docking, each robotic arm and tower were checked in relation to the patient's legs and hands to avoid inadvertent compression. After checking with anesthesia, we then proceeded with the robotic portion of the case.     DISSECTION:   The bladder was incised along the midline at the dome. The inside of the bladder was examined. The ureteral orifices were easily identified on both the right and left sides and were a safe distance from the bladder neck. There was not a  median lobe component. We first incised the mucosa overlying the posterior prostate-bladder interface using electrocautery. We then developed a subcapsular dissection plane between the prostate adenoma and the peripheral zone. Bleeding vessels were controlled as we progressed with electrocautery. The dissection plane was carried circumferentially around the prostate adenoma. We continued the dissection distally to the apex of the prostate. We then circumferentially dissected the adenoma toward the 12 o'clock position. At the 12 o'clock position, the anterior commissure was split. This allowed removal of the entire specimen. This was passed off the field later removal. The subcapsular fossa was  inspected for further bleeding. All bleeding points were controlled using electrocautery.      ANASTOMOSIS:   Next, the bladder mucosa was advanced distally toward the membranous urethra. This was done using a running 3-0 V-Loc suture. We were careful to avoid the ureteral orifices during reapproximation of the posterior portion of the anastamosis. After re-approximation of the bladder mucosa was performed, adjunctive hemostatic agent of Surgiflo 10 mL was injected into the subcapsular prostatic fossa. A 20-Tuvaluan Reyes catheter was then inserted into the bladder. 20 mL of water was placed in the balloon.     BLADDER CLOSURE:   The cystotomy was then closed in 2 layers using running 3-0 and 2-0 V-Loc sutures. The bladder was then filled with approximately 300 mL of sterile water and the closure was found to be water tight.     CLOSURE:   The specimen was placed into an EndoCatch bag and was removed via the midline camera port after extension in a transverse muscle-splitting fashion. All trocars were then removed. The specimen extraction site anterior fascia was closed with a running 0 PDS suture. All skin incisions were closed using 4-0 Monocryl and surgical glue.      Sponge, needle, and instrument counts were correct at the end of the case. The patient was awoken from general anesthesia and transferred to the PACU in stable condition.     INima MD, performed the entire procedure as the primary attending surgeon.    Nima Wolfe MD   [facility] [facility]   [facility]

## 2025-06-19 NOTE — ANESTHESIA PREPROCEDURE EVALUATION
Procedure:  PROSTATECTOMY,SUPRAPUBIC LAPAROSCOPIC WITH ROBOTICS (Abdomen)  CYSTOSCOPY FLEXIBLE (Bladder)    Relevant Problems   CARDIO   (+) Combined arterial insufficiency and corporo-venous occlusive erectile dysfunction   (+) Hyperlipidemia LDL goal <100   (+) Hypertension      GI/HEPATIC   (+) GERD (gastroesophageal reflux disease)      /RENAL   (+) Benign prostatic hyperplasia with urinary obstruction   (+) Stage 3a chronic kidney disease (HCC)      HEMATOLOGY   (+) Thrombocytopenia (HCC)      Surgery/Wound/Pain   (+) History of heart transplant (HCC)      Cardiovascular/Peripheral Vascular   (+) Chronic systolic heart failure (HCC) (EF 61%)   Echo:    Left Ventricle: Left ventricular cavity size is normal. Wall thickness is normal. The left ventricular ejection fraction is 61% by biplane measurement. Systolic function is normal. Wall motion is normal.    Tricuspid Valve: There is mild regurgitation. The right ventricular systolic pressure is normal. The estimated right ventricular systolic pressure is 21.00 mmHg.    Pulmonic Valve: There is mild regurgitation.     Physical Exam    Airway     Mallampati score: II  TM Distance: >3 FB    Mouth opening: >= 4 cm      Cardiovascular  Rhythm: regular, Rate: normal    Dental   Comment: dentures     Pulmonary   Breath sounds clear to auscultation    Neurological    He appears awake, alert and oriented x3.      Other Findings        Anesthesia Plan  ASA Score- 2     Anesthesia Type- general with ASA Monitors.         Additional Monitors: arterial line.    Airway Plan: Oral ETT.    Comment: Discussed art line placement if needed, Plan for 2nd PIV, confirmed transfusion OK if needed.       Plan Factors-Exercise tolerance (METS): >4 METS.    Chart reviewed.   Existing labs reviewed.     Patient is not a current smoker.      Obstructive sleep apnea risk education given perioperatively.        Induction- intravenous.    Postoperative Plan- Plan for postoperative opioid  use.   Monitoring Plan - Monitoring plan - standard ASA monitoring and arterial line kit  Post Operative Pain Plan - non-opiod analgesics, plan for postoperative opioid use and multimodal analgesia        Informed Consent- Anesthetic plan and risks discussed with patient.        NPO Status:  Vitals Value Taken Time   Date of last liquid 06/19/25 06/19/25 10:45   Time of last liquid 0700 06/19/25 10:45   Date of last solid 06/18/25 06/19/25 10:45   Time of last solid 1800 06/19/25 10:45

## 2025-06-19 NOTE — ANESTHESIA POSTPROCEDURE EVALUATION
Post-Op Assessment Note    CV Status:  Stable    Pain management: adequate       Mental Status:  Alert and awake   Hydration Status:  Euvolemic   PONV Controlled:  Controlled   Airway Patency:  Patent  Two or more mitigation strategies used for obstructive sleep apnea   Post Op Vitals Reviewed: Yes    No anethesia notable event occurred.    Staff: Anesthesiologist           Last Filed PACU Vitals:  Vitals Value Taken Time   Temp 97.8 °F (36.6 °C) 06/19/25 17:34   Pulse 90 06/19/25 17:48   /76 06/19/25 17:47   Resp 17 06/19/25 16:56   SpO2 90 % 06/19/25 17:48   Vitals shown include unfiled device data.    Modified Park:     Vitals Value Taken Time   Activity 2 06/19/25 16:56   Respiration 2 06/19/25 16:56   Circulation 2 06/19/25 16:56   Consciousness 2 06/19/25 16:56   Oxygen Saturation 1 06/19/25 16:56     Modified Park Score: 9

## 2025-06-19 NOTE — H&P
Assessment & Plan  BPH with elevated PSA and lower urinary tract symptoms  The patient and I again had a discussion about perioperative expectations for robotic simple prostatectomy.      I did explain that he may have an initial experience with worse urinary leakage from unchecked urge incontinence and that this will improve over time.     We discussed a small possibility of post-operative stress incontinence but that this is unlikely.     Further surgical risks were discussed including bleeding, infection, blood clot, leakage of urine from the bladder closure, and other medical complications.      He understands the above and all questions were answered.      271cc prostate With bladder trabeculations and significant thickening. Has not yet had cystoscopy.     - Schedule for cystoscopy and robotic simple prostatectomy next available   - Will need cardiac clearance preoperatively given his heart transplant history           History of Present Illness[]Expand by Default     Chel Rea is a 69 y.o. male who presents for follow up of BPH with elevated PSA.      Since the last visit, he has had no major changes in health. He has seen Dr. Early in the interim.      He is interested in proceeding with surgery.      He has stable lower urinary tract symptoms on flomax. He has hesitancy, weak stream, urgency, urge incontinence, and a sensation of incomplete emptying.      History obtained from : patient    Vitals:    06/19/25 1047   BP: 162/96   Pulse: 105   Resp: 18   Temp: 98.3 °F (36.8 °C)   SpO2: 97%      General: Well appearing, no acute distress   HEENT: normocephalic, atraumatic  Eyes: extraocular movements intact  Cardiovascular: normal rate   Respiratory: no respiratory distress, effort normal   Abdominal: Non-distended, non-tender   : Deferred  MSK: Grossly normal range of motion   Neurological: No gross focal deficits  Behavioral: Normal mood and affect     Proceed with cystoscopy and robotic simple  prostatectomy as scheduled.     Nima Wolfe MD   [facility]   [facility]

## 2025-06-19 NOTE — PLAN OF CARE
Problem: PAIN - ADULT  Goal: Verbalizes/displays adequate comfort level or baseline comfort level  Description: Interventions:  - Encourage patient to monitor pain and request assistance  - Assess pain using appropriate pain scale  - Administer analgesics as ordered based on type and severity of pain and evaluate response  - Implement non-pharmacological measures as appropriate and evaluate response  - Consider cultural and social influences on pain and pain management  - Notify physician/advanced practitioner if interventions unsuccessful or patient reports new pain  - Educate patient/family on pain management process including their role and importance of  reporting pain   - Provide non-pharmacologic/complimentary pain relief interventions  Outcome: Progressing     Problem: INFECTION - ADULT  Goal: Absence or prevention of progression during hospitalization  Description: INTERVENTIONS:  - Assess and monitor for signs and symptoms of infection  - Monitor lab/diagnostic results  - Monitor all insertion sites, i.e. indwelling lines, tubes, and drains  - Monitor endotracheal if appropriate and nasal secretions for changes in amount and color  - [place] appropriate cooling/warming therapies per order  - Administer medications as ordered  - Instruct and encourage patient and family to use good hand hygiene technique  - Identify and instruct in appropriate isolation precautions for identified infection/condition  Outcome: Progressing  Goal: Absence of fever/infection during neutropenic period  Description: INTERVENTIONS:  - Monitor WBC  - Perform strict hand hygiene  - Limit to healthy visitors only  - No plants, dried, fresh or silk flowers with goldstein in patient room  Outcome: Progressing     Problem: SAFETY ADULT  Goal: Patient will remain free of falls  Description: INTERVENTIONS:  - Educate patient/family on patient safety including physical limitations  - Instruct patient to call for assistance with activity   -  Consider consulting OT/PT to assist with strengthening/mobility based on AM PAC & JH-HLM score  - Consult OT/PT to assist with strengthening/mobility   - Keep Call bell within reach  - Keep bed low and locked with side rails adjusted as appropriate  - Keep care items and personal belongings within reach  - Initiate and maintain comfort rounds  - Make Fall Risk Sign visible to staff  - Apply yellow socks and bracelet for high fall risk patients  - Consider moving patient to room near nurses station  Outcome: Progressing  Goal: Maintain or return to baseline ADL function  Description: INTERVENTIONS:  -  Assess patient's ability to carry out ADLs; assess patient's baseline for ADL function and identify physical deficits which impact ability to perform ADLs (bathing, care of mouth/teeth, toileting, grooming, dressing, etc.)  - Assess/evaluate cause of self-care deficits   - Assess range of motion  - Assess patient's mobility; develop plan if impaired  - Assess patient's need for assistive devices and provide as appropriate  - Encourage maximum independence but intervene and supervise when necessary  - Involve family in performance of ADLs  - Assess for home care needs following discharge   - Consider OT consult to assist with ADL evaluation and planning for discharge  - Provide patient education as appropriate  - Monitor functional capacity and physical performance, use of AM PAC & JH-HLM   - Monitor gait, balance and fatigue with ambulation    Outcome: Progressing  Goal: Maintains/Returns to pre admission functional level  Description: INTERVENTIONS:  - Perform AM-PAC 6 Click Basic Mobility/ Daily Activity assessment daily.  - Set and communicate daily mobility goal to care team and patient/family/caregiver.   - Collaborate with rehabilitation services on mobility goals if consulted  - Out of bed for toileting  - Record patient progress and toleration of activity level   Outcome: Progressing     Problem: DISCHARGE  PLANNING  Goal: Discharge to home or other facility with appropriate resources  Description: INTERVENTIONS:  - Identify barriers to discharge w/patient and caregiver  - Arrange for needed discharge resources and transportation as appropriate  - Identify discharge learning needs (meds, wound care, etc.)  - Arrange for interpretive services to assist at discharge as needed  - Refer to Case Management Department for coordinating discharge planning if the patient needs post-hospital services based on physician/advanced practitioner order or complex needs related to functional status, cognitive ability, or social support system  Outcome: Progressing     Problem: Knowledge Deficit  Goal: Patient/family/caregiver demonstrates understanding of disease process, treatment plan, medications, and discharge instructions  Description: Complete learning assessment and assess knowledge base.  Interventions:  - Provide teaching at level of understanding  - Provide teaching via preferred learning methods  Outcome: Progressing     Problem: GENITOURINARY - ADULT  Goal: Maintains or returns to baseline urinary function  Description: INTERVENTIONS:  - Assess urinary function  - Encourage oral fluids to ensure adequate hydration if ordered  - Administer IV fluids as ordered to ensure adequate hydration  - Administer ordered medications as needed  - Offer frequent toileting  - Follow urinary retention protocol if ordered  Outcome: Progressing  Goal: Urinary catheter remains patent  Description: INTERVENTIONS:  - Assess patency of urinary catheter  - If patient has a chronic russo, consider changing catheter if non-functioning  - Follow guidelines for intermittent irrigation of non-functioning urinary catheter  Outcome: Progressing     Problem: SKIN/TISSUE INTEGRITY - ADULT  Goal: Incision(s), wounds(s) or drain site(s) healing without S/S of infection  Description: INTERVENTIONS  - Assess and document dressing, incision, wound bed, drain  sites and surrounding tissue  - Provide patient and family education  - Perform skin care/dressing changes every shift    Outcome: Progressing

## 2025-06-20 ENCOUNTER — TELEPHONE (OUTPATIENT)
Dept: OTHER | Facility: HOSPITAL | Age: 70
End: 2025-06-20

## 2025-06-20 VITALS
HEIGHT: 65 IN | WEIGHT: 148.59 LBS | DIASTOLIC BLOOD PRESSURE: 73 MMHG | BODY MASS INDEX: 24.76 KG/M2 | HEART RATE: 101 BPM | OXYGEN SATURATION: 97 % | RESPIRATION RATE: 18 BRPM | SYSTOLIC BLOOD PRESSURE: 116 MMHG | TEMPERATURE: 98.2 F

## 2025-06-20 PROBLEM — N18.31 CKD STAGE 3A, GFR 45-59 ML/MIN (HCC): Status: ACTIVE | Noted: 2025-06-20

## 2025-06-20 LAB
ABO GROUP BLD BPU: NORMAL
ABO GROUP BLD BPU: NORMAL
ANION GAP SERPL CALCULATED.3IONS-SCNC: 6 MMOL/L (ref 4–13)
BPU ID: NORMAL
BPU ID: NORMAL
BUN SERPL-MCNC: 19 MG/DL (ref 5–25)
CALCIUM SERPL-MCNC: 8.5 MG/DL (ref 8.4–10.2)
CHLORIDE SERPL-SCNC: 105 MMOL/L (ref 96–108)
CO2 SERPL-SCNC: 26 MMOL/L (ref 21–32)
CREAT SERPL-MCNC: 1.17 MG/DL (ref 0.6–1.3)
CROSSMATCH: NORMAL
CROSSMATCH: NORMAL
ERYTHROCYTE [DISTWIDTH] IN BLOOD BY AUTOMATED COUNT: 13.2 % (ref 11.6–15.1)
GFR SERPL CREATININE-BSD FRML MDRD: 62 ML/MIN/1.73SQ M
GLUCOSE SERPL-MCNC: 130 MG/DL (ref 65–140)
HCT VFR BLD AUTO: 31.6 % (ref 36.5–49.3)
HGB BLD-MCNC: 10.9 G/DL (ref 12–17)
MCH RBC QN AUTO: 28.7 PG (ref 26.8–34.3)
MCHC RBC AUTO-ENTMCNC: 34.5 G/DL (ref 31.4–37.4)
MCV RBC AUTO: 83 FL (ref 82–98)
PLATELET # BLD AUTO: 125 THOUSANDS/UL (ref 149–390)
PMV BLD AUTO: 10.4 FL (ref 8.9–12.7)
POTASSIUM SERPL-SCNC: 4.8 MMOL/L (ref 3.5–5.3)
RBC # BLD AUTO: 3.8 MILLION/UL (ref 3.88–5.62)
SODIUM SERPL-SCNC: 137 MMOL/L (ref 135–147)
UNIT DISPENSE STATUS: NORMAL
UNIT DISPENSE STATUS: NORMAL
UNIT PRODUCT CODE: NORMAL
UNIT PRODUCT CODE: NORMAL
UNIT PRODUCT VOLUME: 350 ML
UNIT PRODUCT VOLUME: 350 ML
UNIT RH: NORMAL
UNIT RH: NORMAL
WBC # BLD AUTO: 6.76 THOUSAND/UL (ref 4.31–10.16)

## 2025-06-20 PROCEDURE — 80048 BASIC METABOLIC PNL TOTAL CA: CPT

## 2025-06-20 PROCEDURE — 85027 COMPLETE CBC AUTOMATED: CPT

## 2025-06-20 PROCEDURE — 99205 OFFICE O/P NEW HI 60 MIN: CPT | Performed by: INTERNAL MEDICINE

## 2025-06-20 PROCEDURE — 99024 POSTOP FOLLOW-UP VISIT: CPT | Performed by: PHYSICIAN ASSISTANT

## 2025-06-20 RX ORDER — GABAPENTIN 100 MG/1
100 CAPSULE ORAL 3 TIMES DAILY
Qty: 15 CAPSULE | Refills: 0 | Status: SHIPPED | OUTPATIENT
Start: 2025-06-20 | End: 2025-06-25

## 2025-06-20 RX ORDER — IBUPROFEN 600 MG/1
600 TABLET, FILM COATED ORAL EVERY 6 HOURS SCHEDULED
Qty: 20 TABLET | Refills: 0 | Status: SHIPPED | OUTPATIENT
Start: 2025-06-20 | End: 2025-06-25

## 2025-06-20 RX ORDER — OXYCODONE HYDROCHLORIDE 5 MG/1
2.5 TABLET ORAL EVERY 6 HOURS PRN
Qty: 5 TABLET | Refills: 0 | Status: SHIPPED | OUTPATIENT
Start: 2025-06-20

## 2025-06-20 RX ORDER — SENNA AND DOCUSATE SODIUM 50; 8.6 MG/1; MG/1
2 TABLET, FILM COATED ORAL 2 TIMES DAILY
Qty: 28 TABLET | Refills: 0 | Status: SHIPPED | OUTPATIENT
Start: 2025-06-20 | End: 2025-06-27

## 2025-06-20 RX ORDER — ACETAMINOPHEN 500 MG
1000 TABLET ORAL EVERY 6 HOURS
Qty: 56 TABLET | Refills: 0 | Status: SHIPPED | OUTPATIENT
Start: 2025-06-20 | End: 2025-06-27

## 2025-06-20 RX ADMIN — HEPARIN SODIUM 5000 UNITS: 5000 INJECTION INTRAVENOUS; SUBCUTANEOUS at 05:50

## 2025-06-20 RX ADMIN — OXYBUTYNIN CHLORIDE 5 MG: 5 TABLET, EXTENDED RELEASE ORAL at 08:16

## 2025-06-20 RX ADMIN — PANTOPRAZOLE SODIUM 40 MG: 40 TABLET, DELAYED RELEASE ORAL at 05:51

## 2025-06-20 RX ADMIN — Medication 2.5 MG: at 09:57

## 2025-06-20 RX ADMIN — SENNOSIDES 8.6 MG: 8.6 TABLET, FILM COATED ORAL at 08:16

## 2025-06-20 RX ADMIN — KETOROLAC TROMETHAMINE 7.5 MG: 30 INJECTION, SOLUTION INTRAMUSCULAR; INTRAVENOUS at 05:52

## 2025-06-20 RX ADMIN — SODIUM CHLORIDE, SODIUM LACTATE, POTASSIUM CHLORIDE, AND CALCIUM CHLORIDE 125 ML/HR: .6; .31; .03; .02 INJECTION, SOLUTION INTRAVENOUS at 11:02

## 2025-06-20 RX ADMIN — LOSARTAN POTASSIUM 100 MG: 50 TABLET, FILM COATED ORAL at 08:17

## 2025-06-20 RX ADMIN — MYCOPHENOLATE MOFETIL 500 MG: 250 CAPSULE ORAL at 08:18

## 2025-06-20 RX ADMIN — TACROLIMUS 2 MG: 1 CAPSULE ORAL at 09:58

## 2025-06-20 RX ADMIN — ACETAMINOPHEN 975 MG: 325 TABLET, FILM COATED ORAL at 12:53

## 2025-06-20 RX ADMIN — LORATADINE 10 MG: 10 TABLET ORAL at 08:16

## 2025-06-20 RX ADMIN — GABAPENTIN 100 MG: 100 CAPSULE ORAL at 08:16

## 2025-06-20 RX ADMIN — ACETAMINOPHEN 975 MG: 325 TABLET, FILM COATED ORAL at 05:50

## 2025-06-20 RX ADMIN — ASPIRIN 81 MG: 81 TABLET, COATED ORAL at 08:16

## 2025-06-20 RX ADMIN — AMLODIPINE BESYLATE 10 MG: 10 TABLET ORAL at 08:17

## 2025-06-20 NOTE — ASSESSMENT & PLAN NOTE
POD#1 robot assisted laparoscopic suprapubic prostatectomy for BPH  Pain controlled  Urine clear yellow  Regular diet  Add incentive spirometry  Reyes stays for 1 week (office removal followed by postop visit later this month)  Discharge home today- he has an existing home  and/or nurse that will provide transportation and home care

## 2025-06-20 NOTE — DISCHARGE SUMMARY
Discharge Summary - Urology   Name: Chel Rea 69 y.o. male I MRN: [id number]  Unit/Bed#: E5 -01 I Date of Admission: 6/19/2025   Date of Service: 6/20/2025 I Hospital Day: 0    Assessment & Plan  Benign prostatic hyperplasia with urinary obstruction  POD#1 robot assisted laparoscopic suprapubic prostatectomy for BPH  Pain controlled  Urine clear yellow  Regular diet  Add incentive spirometry  Reyes stays for 1 week (office removal followed by postop visit later this month)  Discharge home today- he has an existing home  and/or nurse that will provide transportation and home care  CKD stage 3a, GFR 45-59 ml/min ([facility])  Lab Results   Component Value Date    EGFR 62 06/20/2025    EGFR 46 06/03/2025    EGFR 49 05/06/2025    CREATININE 1.17 06/20/2025    CREATININE 1.51 (H) 06/03/2025    CREATININE 1.43 (H) 05/06/2025     creatinine better than baseline today. Discontinue IV fluids. He is orally hydrating now.  nephrology consultation noted and appreciated- losartan held until tomorrow. avoid nsaids.    Admission Date: 6/19/2025 1025  Discharge Date: 06/20/25  Admitting Diagnosis: BPH with elevated PSA and lower urinary tract symptoms [N40.1, R97.20]  Discharge Diagnosis:   Medical Problems       Resolved Problems  Date Reviewed: 3/13/2025   None         HPI: feels well, incisional pain took the 2.5 oxycodone with no rash/hives and helped his pain. he is hungry. he is passing gas walking in halls and had small BM this morning. He has help at home    Procedures Performed: No orders of the defined types were placed in this encounter.      Summary of Hospital Course:  Tom is admitted observation following planned robot assisted laparoscopic suprapubic prostatectomy for management of large gland BPH. Reyes catheter with clear peach to yellow urine overnight without irrigation needs. Eating, ambulating, and passing gas. Excellent pain control with oral meds. Creatinine better than usual. No vital sign  "abnormality or other new symptoms. He is appropriate for discharge home today.    Significant Findings, Care, Treatment and Services Provided: see op note, neph consultation note    Complications: none    Discharge Day Visit / Exam:   /80   Pulse 101   Temp 97.5 °F (36.4 °C)   Resp 16   Ht 5' 5\" (1.651 m)   Wt 67.4 kg (148 lb 9.4 oz)   SpO2 100%   BMI 24.73 kg/m²   Physical Exam  Constitutional:       General: He is not in acute distress.     Appearance: Normal appearance. He is not ill-appearing or diaphoretic.   HENT:      Head: Normocephalic and atraumatic.     Cardiovascular:      Rate and Rhythm: Normal rate and regular rhythm.      Pulses: Normal pulses.      Heart sounds: Normal heart sounds.   Pulmonary:      Effort: Pulmonary effort is normal.      Breath sounds: Normal breath sounds.   Abdominal:      General: Abdomen is flat.      Palpations: Abdomen is soft.      Comments: abdominal incisions clean and dry intact with skin glue  no drain  no rash  expected quirino-incisional tenderness of the supraumbilical incision   Genitourinary:     Penis: Normal.       Comments: russo per urethra drianing clear yellow urine    Musculoskeletal:      Right lower leg: No edema.      Left lower leg: No edema.     Skin:     Coloration: Skin is not pale.      Findings: No rash.     Neurological:      General: No focal deficit present.      Mental Status: He is alert. Mental status is at baseline.      Gait: Gait normal.     Psychiatric:         Mood and Affect: Mood normal.          Discussion with Family: Patient declined call to .     Condition at Discharge: good       Discharge instructions/Information to patient and family:   See After Visit Summary (AVS) for information provided to patient and family.      Provisions for Follow-Up Care:  See after visit summary for information related to follow-up care and any pertinent home health orders.      PCP: Toribio Thomas    Disposition: " Home    Planned Readmission: No     Discharge Medications:  See after visit summary for reconciled discharge medications provided to patient and family.      Discharge Statement:  I have spent a total time of 20 minutes in caring for this patient on the day of the visit/encounter. .

## 2025-06-20 NOTE — TELEPHONE ENCOUNTER
Post Op Note    Chel Rea is a 69 y.o. male s/p prostatectomy suprapubic laparoscopic with robotics  performed 06/19/2025.  Chel Rea is a patient of Dr. Dr. Wolfe and is seen at the [place] office.   Called and spoke with the patient who states he is still in the hospital, waiting to be discharged.  Patient is a [facility] patient so I asked about transportation for his appointments and [facility] provides the transportation  Will make post op call on Monday.    Called [facility] [phone] bharath spoke with Ruthie. Explained the type of appointment needed for the patient TOV.   Ruthie will have patient's nurse,Izabel, call the office..

## 2025-06-20 NOTE — ASSESSMENT & PLAN NOTE
BP fluctuating but stable  Home regimen: Amlodipine 10 mg daily, losartan 100 mg daily  Holding losartan as above  Avoid hypotension

## 2025-06-20 NOTE — ASSESSMENT & PLAN NOTE
Lab Results   Component Value Date    EGFR 62 06/20/2025    EGFR 46 06/03/2025    EGFR 49 05/06/2025    CREATININE 1.17 06/20/2025    CREATININE 1.51 (H) 06/03/2025    CREATININE 1.43 (H) 05/06/2025     creatinine better than baseline today. Discontinue IV fluids. He is orally hydrating now.  nephrology consultation noted and appreciated- losartan held until tomorrow. avoid nsaids.

## 2025-06-20 NOTE — ASSESSMENT & PLAN NOTE
History of urinary retention, BPH, elevated PSA  Status post robotic laparoscopic prostatectomy, reanastomosis and reconstruction of bladder neck to posterior urethra, cystoscopy on 6/19  Currently with Reyes catheter with noted hematuria  Further management per urology

## 2025-06-20 NOTE — TELEPHONE ENCOUNTER
Nima Wolfe MD to Me  RR      6/20/25  9:20 AM  S/p robotic simple prostatectoy 6/19.      Please schedule for TOV in 1 week.

## 2025-06-20 NOTE — ASSESSMENT & PLAN NOTE
Etiology: suspect hypertensive nephrosclerosis, cardiomyopathy with history of heart transplant in 2017, +/- smoking, +/- history of FREDRICK +/- urinary retention with BPH  Baseline creatinine largely 1.1-1.4 but occasional spikes up to 1.5  History of FREDRICK with sCr as high as 2.26 in 2021  Current creatinine within baseline at 1.17  Electrolytes stable  UA in August 2024 with hematuria but no proteinuria  No recent renal imaging, but CT in 2021 without hydro.  Small right renal cyst  Was prescribed Toradol Q6.  Did receive 3 doses  Plan:   Continue IVF as patient not yet eating, currently on  cc an hour  Holding losartan today, can likely resume tomorrow  Avoid all NSAIDs   Avoid hypotension, IV contrast  Recommend BMP 1 week after discharge

## 2025-06-20 NOTE — NURSING NOTE
Reviewed AVS with patient at bedside . Answered al questions and concerned. Patient didn't have any concern. Pt left via wheelchair with pca and all his belongings.

## 2025-06-20 NOTE — PLAN OF CARE
Problem: PAIN - ADULT  Goal: Verbalizes/displays adequate comfort level or baseline comfort level  Description: Interventions:  - Encourage patient to monitor pain and request assistance  - Assess pain using appropriate pain scale  - Administer analgesics as ordered based on type and severity of pain and evaluate response  - Implement non-pharmacological measures as appropriate and evaluate response  - Consider cultural and social influences on pain and pain management  - Notify physician/advanced practitioner if interventions unsuccessful or patient reports new pain  - Educate patient/family on pain management process including their role and importance of  reporting pain   - Provide non-pharmacologic/complimentary pain relief interventions  Outcome: Progressing     Problem: INFECTION - ADULT  Goal: Absence or prevention of progression during hospitalization  Description: INTERVENTIONS:  - Assess and monitor for signs and symptoms of infection  - Monitor lab/diagnostic results  - Monitor all insertion sites, i.e. indwelling lines, tubes, and drains  - Monitor endotracheal if appropriate and nasal secretions for changes in amount and color  - [place] appropriate cooling/warming therapies per order  - Administer medications as ordered  - Instruct and encourage patient and family to use good hand hygiene technique  - Identify and instruct in appropriate isolation precautions for identified infection/condition  Outcome: Progressing  Goal: Absence of fever/infection during neutropenic period  Description: INTERVENTIONS:  - Monitor WBC  - Perform strict hand hygiene  - Limit to healthy visitors only  - No plants, dried, fresh or silk flowers with goldstein in patient room  Outcome: Progressing     Problem: SAFETY ADULT  Goal: Patient will remain free of falls  Description: INTERVENTIONS:  - Educate patient/family on patient safety including physical limitations  - Instruct patient to call for assistance with activity   -  Consider consulting OT/PT to assist with strengthening/mobility based on AM PAC & JH-HLM score  - Consult OT/PT to assist with strengthening/mobility   - Keep Call bell within reach  - Keep bed low and locked with side rails adjusted as appropriate  - Keep care items and personal belongings within reach  - Initiate and maintain comfort rounds  - Make Fall Risk Sign visible to staff  - Apply yellow socks and bracelet for high fall risk patients  - Consider moving patient to room near nurses station  Outcome: Progressing  Goal: Maintain or return to baseline ADL function  Description: INTERVENTIONS:  -  Assess patient's ability to carry out ADLs; assess patient's baseline for ADL function and identify physical deficits which impact ability to perform ADLs (bathing, care of mouth/teeth, toileting, grooming, dressing, etc.)  - Assess/evaluate cause of self-care deficits   - Assess range of motion  - Assess patient's mobility; develop plan if impaired  - Assess patient's need for assistive devices and provide as appropriate  - Encourage maximum independence but intervene and supervise when necessary  - Involve family in performance of ADLs  - Assess for home care needs following discharge   - Consider OT consult to assist with ADL evaluation and planning for discharge  - Provide patient education as appropriate  - Monitor functional capacity and physical performance, use of AM PAC & JH-HLM   - Monitor gait, balance and fatigue with ambulation    Outcome: Progressing  Goal: Maintains/Returns to pre admission functional level  Description: INTERVENTIONS:  - Perform AM-PAC 6 Click Basic Mobility/ Daily Activity assessment daily.  - Set and communicate daily mobility goal to care team and patient/family/caregiver.   - Collaborate with rehabilitation services on mobility goals if consulted  - Out of bed for toileting  - Record patient progress and toleration of activity level   Outcome: Progressing     Problem: DISCHARGE  PLANNING  Goal: Discharge to home or other facility with appropriate resources  Description: INTERVENTIONS:  - Identify barriers to discharge w/patient and caregiver  - Arrange for needed discharge resources and transportation as appropriate  - Identify discharge learning needs (meds, wound care, etc.)  - Arrange for interpretive services to assist at discharge as needed  - Refer to Case Management Department for coordinating discharge planning if the patient needs post-hospital services based on physician/advanced practitioner order or complex needs related to functional status, cognitive ability, or social support system  Outcome: Progressing     Problem: Knowledge Deficit  Goal: Patient/family/caregiver demonstrates understanding of disease process, treatment plan, medications, and discharge instructions  Description: Complete learning assessment and assess knowledge base.  Interventions:  - Provide teaching at level of understanding  - Provide teaching via preferred learning methods  Outcome: Progressing     Problem: GENITOURINARY - ADULT  Goal: Maintains or returns to baseline urinary function  Description: INTERVENTIONS:  - Assess urinary function  - Encourage oral fluids to ensure adequate hydration if ordered  - Administer IV fluids as ordered to ensure adequate hydration  - Administer ordered medications as needed  - Offer frequent toileting  - Follow urinary retention protocol if ordered  Outcome: Progressing  Goal: Urinary catheter remains patent  Description: INTERVENTIONS:  - Assess patency of urinary catheter  - If patient has a chronic russo, consider changing catheter if non-functioning  - Follow guidelines for intermittent irrigation of non-functioning urinary catheter  Outcome: Progressing     Problem: SKIN/TISSUE INTEGRITY - ADULT  Goal: Incision(s), wounds(s) or drain site(s) healing without S/S of infection  Description: INTERVENTIONS  - Assess and document dressing, incision, wound bed, drain  sites and surrounding tissue  - Provide patient and family education  - Perform skin care/dressing changes every shift    Outcome: Progressing

## 2025-06-20 NOTE — PLAN OF CARE
Problem: PAIN - ADULT  Goal: Verbalizes/displays adequate comfort level or baseline comfort level  Description: Interventions:  - Encourage patient to monitor pain and request assistance  - Assess pain using appropriate pain scale  - Administer analgesics as ordered based on type and severity of pain and evaluate response  - Implement non-pharmacological measures as appropriate and evaluate response  - Consider cultural and social influences on pain and pain management  - Notify physician/advanced practitioner if interventions unsuccessful or patient reports new pain  - Educate patient/family on pain management process including their role and importance of  reporting pain   - Provide non-pharmacologic/complimentary pain relief interventions  Outcome: Progressing     Problem: INFECTION - ADULT  Goal: Absence or prevention of progression during hospitalization  Description: INTERVENTIONS:  - Assess and monitor for signs and symptoms of infection  - Monitor lab/diagnostic results  - Monitor all insertion sites, i.e. indwelling lines, tubes, and drains  - Monitor endotracheal if appropriate and nasal secretions for changes in amount and color  - [place] appropriate cooling/warming therapies per order  - Administer medications as ordered  - Instruct and encourage patient and family to use good hand hygiene technique  - Identify and instruct in appropriate isolation precautions for identified infection/condition  Outcome: Progressing  Goal: Absence of fever/infection during neutropenic period  Description: INTERVENTIONS:  - Monitor WBC  - Perform strict hand hygiene  - Limit to healthy visitors only  - No plants, dried, fresh or silk flowers with goldstein in patient room  Outcome: Progressing     Problem: SAFETY ADULT  Goal: Patient will remain free of falls  Description: INTERVENTIONS:  - Educate patient/family on patient safety including physical limitations  - Instruct patient to call for assistance with activity   -  Consider consulting OT/PT to assist with strengthening/mobility based on AM PAC & JH-HLM score  - Consult OT/PT to assist with strengthening/mobility   - Keep Call bell within reach  - Keep bed low and locked with side rails adjusted as appropriate  - Keep care items and personal belongings within reach  - Initiate and maintain comfort rounds  - Make Fall Risk Sign visible to staff  - Apply yellow socks and bracelet for high fall risk patients  - Consider moving patient to room near nurses station  Outcome: Progressing  Goal: Maintain or return to baseline ADL function  Description: INTERVENTIONS:  -  Assess patient's ability to carry out ADLs; assess patient's baseline for ADL function and identify physical deficits which impact ability to perform ADLs (bathing, care of mouth/teeth, toileting, grooming, dressing, etc.)  - Assess/evaluate cause of self-care deficits   - Assess range of motion  - Assess patient's mobility; develop plan if impaired  - Assess patient's need for assistive devices and provide as appropriate  - Encourage maximum independence but intervene and supervise when necessary  - Involve family in performance of ADLs  - Assess for home care needs following discharge   - Consider OT consult to assist with ADL evaluation and planning for discharge  - Provide patient education as appropriate  - Monitor functional capacity and physical performance, use of AM PAC & JH-HLM   - Monitor gait, balance and fatigue with ambulation    Outcome: Progressing  Goal: Maintains/Returns to pre admission functional level  Description: INTERVENTIONS:  - Perform AM-PAC 6 Click Basic Mobility/ Daily Activity assessment daily.  - Set and communicate daily mobility goal to care team and patient/family/caregiver.   - Collaborate with rehabilitation services on mobility goals if consulted  - Out of bed for toileting  - Record patient progress and toleration of activity level   Outcome: Progressing     Problem: DISCHARGE  PLANNING  Goal: Discharge to home or other facility with appropriate resources  Description: INTERVENTIONS:  - Identify barriers to discharge w/patient and caregiver  - Arrange for needed discharge resources and transportation as appropriate  - Identify discharge learning needs (meds, wound care, etc.)  - Arrange for interpretive services to assist at discharge as needed  - Refer to Case Management Department for coordinating discharge planning if the patient needs post-hospital services based on physician/advanced practitioner order or complex needs related to functional status, cognitive ability, or social support system  Outcome: Progressing     Problem: Knowledge Deficit  Goal: Patient/family/caregiver demonstrates understanding of disease process, treatment plan, medications, and discharge instructions  Description: Complete learning assessment and assess knowledge base.  Interventions:  - Provide teaching at level of understanding  - Provide teaching via preferred learning methods  Outcome: Progressing     Problem: GENITOURINARY - ADULT  Goal: Maintains or returns to baseline urinary function  Description: INTERVENTIONS:  - Assess urinary function  - Encourage oral fluids to ensure adequate hydration if ordered  - Administer IV fluids as ordered to ensure adequate hydration  - Administer ordered medications as needed  - Offer frequent toileting  - Follow urinary retention protocol if ordered  Outcome: Progressing  Goal: Urinary catheter remains patent  Description: INTERVENTIONS:  - Assess patency of urinary catheter  - If patient has a chronic russo, consider changing catheter if non-functioning  - Follow guidelines for intermittent irrigation of non-functioning urinary catheter  Outcome: Progressing     Problem: SKIN/TISSUE INTEGRITY - ADULT  Goal: Incision(s), wounds(s) or drain site(s) healing without S/S of infection  Description: INTERVENTIONS  - Assess and document dressing, incision, wound bed, drain  sites and surrounding tissue  - Provide patient and family education  - Perform skin care/dressing changes every shift    Outcome: Progressing

## 2025-06-20 NOTE — CONSULTS
Consultation - Nephrology   Name: Chel Rea 69 y.o. male I MRN: [id number]  Unit/Bed#: E5 -01 I Date of Admission: 6/19/2025   Date of Service: 6/20/2025 I Hospital Day: 0   Inpatient consult to Nephrology  Consult performed by: Veena Gilman PA-C  Consult ordered by: Jonah Muñoz MD        Physician Requesting Evaluation: Nima Wolfe MD   Reason for Evaluation / Principal Problem: CKD    Assessment & Plan  CKD stage 3a, GFR 45-59 ml/min ([facility])  Etiology: suspect hypertensive nephrosclerosis, cardiomyopathy with history of heart transplant in 2017, +/- smoking, +/- history of FREDRICK +/- urinary retention with BPH  Baseline creatinine largely 1.1-1.4 but occasional spikes up to 1.5  History of FREDRICK with sCr as high as 2.26 in 2021  Current creatinine within baseline at 1.17  Electrolytes stable  UA in August 2024 with hematuria but no proteinuria  No recent renal imaging, but CT in 2021 without hydro.  Small right renal cyst  Was prescribed Toradol Q6.  Did receive 3 doses  Plan:   Continue IVF as patient not yet eating, currently on  cc an hour  Holding losartan today, can likely resume tomorrow  Avoid all NSAIDs   Avoid hypotension, IV contrast  Recommend BMP 1 week after discharge  Benign prostatic hyperplasia with urinary obstruction  History of urinary retention, BPH, elevated PSA  Status post robotic laparoscopic prostatectomy, reanastomosis and reconstruction of bladder neck to posterior urethra, cystoscopy on 6/19  Currently with Reyes catheter with noted hematuria  Further management per urology  Hypertension  BP fluctuating but stable  Home regimen: Amlodipine 10 mg daily, losartan 100 mg daily  Holding losartan as above  Avoid hypotension       History of Present Illness   Chel Rea is a 69 y.o. male with PMH CKD, hypertension, pulmonary embolism, cardiomyopathy status post heart transplant, BPH who was admitted to [facility] for prostatectomy, reanastomosis and reconstruction of bladder  "neck to posterior urethra, cystoscopy on 6/19 in the setting of BPH with urinary obstruction/retention. A renal consultation is requested today for assistance in the management of CKD.  Patient denies prior history of kidney problems.  He did have heart transplant in 2017, states the cause of this was due to \"heart disease\", believes secondary to smoking. Per cardiology note 5/1, may have been non-ischemic cardiomyopathy d/t alcohol.  Current immunosuppression regimen is tacrolimus 2 mg in a.m. and p.m. with an extra 0.5 mg before bedtime plus CellCept 500 mg twice daily.  Denies using NSAIDs at home due to history of heart disease.  Postoperatively, patient overall feels well.  Has not yet eaten but denies nausea, vomiting.  No shortness of breath or chest pain.  All questions and concerns answered at this time.    Review of Systems   Constitutional:  Negative for appetite change.   Respiratory:  Negative for chest tightness and shortness of breath.    Cardiovascular:  Negative for chest pain and leg swelling.   Gastrointestinal:  Positive for abdominal distention (mild discomfort). Negative for diarrhea, nausea and vomiting.   Genitourinary:         Reyes    Neurological:  Negative for dizziness, light-headedness and headaches.     Medical History Review: I have reviewed the patient's PMH, PSH, Social History, Family History, Meds, and Allergies   Historical Information   Past Medical History[1]  Past Surgical History[2]  Social History[3]  E-Cigarette/Vaping    E-Cigarette Use Never User      E-Cigarette/Vaping Substances    Nicotine No     THC No     CBD No     Flavoring No     Other No        Social History[4]    Current Facility-Administered Medications:     acetaminophen (TYLENOL) tablet 975 mg, Q6H VICKIE    aluminum-magnesium hydroxide-simethicone (MAALOX) oral suspension 30 mL, Q6H PRN    amLODIPine (NORVASC) tablet 10 mg, Daily    aspirin (ECOTRIN LOW STRENGTH) EC tablet 81 mg, Daily    calcium carbonate " (TUMS) chewable tablet 1,000 mg, Daily PRN    gabapentin (NEURONTIN) capsule 100 mg, TID    heparin (porcine) subcutaneous injection 5,000 Units, Q8H VICKIE    [Held by provider] ketorolac (TORADOL) injection 7.5 mg, Q6H VICKIE    lactated ringers bolus 1,000 mL, Once PRN **AND** lactated ringers bolus 1,000 mL, Once PRN    lactated ringers infusion, Continuous, Last Rate: 125 mL/hr (06/20/25 1102)    loratadine (CLARITIN) tablet 10 mg, Daily    [Held by provider] losartan (COZAAR) tablet 100 mg, Daily    magnesium hydroxide (MILK OF MAGNESIA) oral suspension 30 mL, Daily PRN    mycophenolate (CELLCEPT) capsule 500 mg, Q12H VICKIE    ondansetron (ZOFRAN) injection 4 mg, Q6H PRN    oxybutynin (DITROPAN-XL) 24 hr tablet 5 mg, Daily    oxyCODONE (ROXICODONE) split tablet 2.5 mg, Q4H PRN **OR** oxyCODONE (ROXICODONE) IR tablet 5 mg, Q4H PRN    pantoprazole (PROTONIX) EC tablet 40 mg, Daily Before Breakfast    pravastatin (PRAVACHOL) tablet 40 mg, HS    promethazine (PHENERGAN) injection 12.5 mg, Q6H PRN    senna (SENOKOT) tablet 8.6 mg, Daily    tacrolimus (PROGRAF) capsule 2 mg, QAM **AND** tacrolimus (PROGRAF) capsule 2.5 mg, HS    [Held by provider] tadalafil (CIALIS) tablet 5 mg, Daily    tamsulosin (FLOMAX) capsule 0.4 mg, HS  Prior to Admission Medications   Prescriptions Last Dose Informant Patient Reported? Taking?   amLODIPine (NORVASC) 10 mg tablet 6/18/2025 at  9:00 PM Self No Yes   Sig: Take 1 tablet (10 mg total) by mouth daily   aspirin (ECOTRIN LOW STRENGTH) 81 mg EC tablet 6/19/2025 at  9:00 AM Self Yes Yes   Sig: Take 81 mg by mouth in the morning.   famotidine (PEPCID) 20 mg tablet Unknown Self Yes No   Sig: Take 40 mg by mouth in the morning. Then 20 mg as needed HS.   fluocinonide (LIDEX) 0.05 % cream Past Month Self No Yes   Sig: Use 2 times a day as needed to affected areas   loratadine (CLARITIN) 10 mg tablet 6/19/2025 at  9:00 AM Self Yes Yes   Sig: Take 10 mg by mouth in the morning.   losartan (COZAAR)  100 MG tablet 6/18/2025 Self Yes Yes   Sig: Take 100 mg by mouth in the morning.   mirtazapine (REMERON) 15 mg tablet 6/14/2025 Self Yes Yes   Sig: Take 15 mg by mouth daily as needed (as needed)   mycophenolate (CELLCEPT) 250 mg capsule 6/19/2025 Self Yes Yes   omeprazole (PriLOSEC) 40 MG capsule 6/19/2025 at  9:00 AM Self No Yes   Sig: Take 1 capsule (40 mg total) by mouth daily   pravastatin (PRAVACHOL) 40 mg tablet 6/18/2025 Self Yes Yes   Sig: every evening   tacrolimus (PROGRAF) 0.5 mg capsule  Self Yes Yes   Sig: Take 0.5 mg by mouth daily at bedtime   tacrolimus (PROGRAF) 1 mg capsule 6/19/2025 at  9:00 AM Self Yes Yes   Sig: Take 2 mg by mouth in the morning and 2 mg in the evening.   tadalafil (CIALIS) 5 MG tablet 6/17/2025 Self No Yes   Sig: TAKE 1 TABLET BY MOUTH DAILY   tamsulosin (FLOMAX) 0.4 mg 6/18/2025 Self No Yes   Sig: Take 1 capsule (0.4 mg total) by mouth daily with dinner      Facility-Administered Medications: None     Oxycodone-acetaminophen and Codeine    Objective :  Temp:  [97.5 °F (36.4 °C)-97.8 °F (36.6 °C)] 97.5 °F (36.4 °C)  HR:  [] 101  BP: (110-146)/(69-86) 132/80  Resp:  [15-17] 16  SpO2:  [94 %-100 %] 100 %  O2 Device: None (Room air)  Nasal Cannula O2 Flow Rate (L/min):  [2 L/min-3 L/min] 2 L/min    Current Weight: Weight - Scale: 67.4 kg (148 lb 9.4 oz)  First Weight: Weight - Scale: 67.4 kg (148 lb 9.4 oz)  I/O         06/18 0701  06/19 0700 06/19 0701 06/20 0700 06/20 0701 06/21 0700    P.O.  240     I.V. (mL/kg)  3425 (50.8)     Total Intake(mL/kg)  3665 (54.4)     Urine (mL/kg/hr)  4100     Blood  600     Total Output  4700     Net  -1035                  Physical Exam  Vitals reviewed.     Cardiovascular:      Rate and Rhythm: Normal rate and regular rhythm.   Pulmonary:      Effort: No respiratory distress.      Breath sounds: Normal breath sounds.   Genitourinary:     Comments: Reyes catheter with pinkish-red urine output    Musculoskeletal:      Right lower leg:  "No edema.      Left lower leg: No edema.     Skin:     General: Skin is warm and dry.     Neurological:      General: No focal deficit present.      Mental Status: He is alert.     Psychiatric:         Mood and Affect: Mood normal.       Medications:  Current Medications[5]      Lab Results: I have reviewed the following results:  Results from last 7 days   Lab Units 06/20/25  0436   WBC Thousand/uL 6.76   HEMOGLOBIN g/dL 10.9*   HEMATOCRIT % 31.6*   PLATELETS Thousands/uL 125*   POTASSIUM mmol/L 4.8   CHLORIDE mmol/L 105   CO2 mmol/L 26   BUN mg/dL 19   CREATININE mg/dL 1.17   CALCIUM mg/dL 8.5       Administrative Statements   Portions of the record may have been created with voice recognition software. Occasional wrong word or \"sound a like\" substitutions may have occurred due to the inherent limitations of voice recognition software. Read the chart carefully and recognize, using context, where substitutions have occurred.If you have any questions, please contact the dictating provider.         [1]   Past Medical History:  Diagnosis Date    Allergic 1991    Benign prostatic hyperplasia     transperineal bx of prostate  today 12/11/2024    Colon polyp     Does use eyeglasses     to read    Does use hearing aid     b/l    Eczema     Elevated PSA     GERD (gastroesophageal reflux disease)     History of heart transplant ([facility])     7/7/17    HL (hearing loss) 2020    Hyperlipidemia     Hypertension 1/19/2019    PE (pulmonary thromboembolism) ([facility]) 2020    Shortness of breath     walking up a hill this is not new as of 5/28/25    UTI (urinary tract infection) 2023    Wears partial dentures     upper   [2]   Past Surgical History:  Procedure Laterality Date    CARDIAC SURGERY  07/2017    Heart transplant    COLONOSCOPY      CT CYSTOURETHROSCOPY N/A 6/19/2025    Procedure: CYSTOSCOPY FLEXIBLE;  Surgeon: Nima Wolfe MD;  Location: [facility];  Service: Urology    CT LAPS SURG [id number] RPBIC RAD W/NRV SPARING ROBOT N/A " 2025    Procedure: PROSTATECTOMY,SUPRAPUBIC LAPAROSCOPIC WITH ROBOTICS;  Surgeon: Nima Wolfe MD;  Location: [facility];  Service: Urology    AZ PROSTATE NEEDLE BIOPSY ANY APPROACH N/A 2024    Procedure: TRANSPERINEAL ULTRASOUND GUIDED BX PROSTATE;  Surgeon: Nima Wolfe MD;  Location: [facility];  Service: Urology    AZ RPR 1ST INGUN HRNA AGE 5 YRS/> REDUCIBLE Left 2024    Procedure: OPEN LEFT INGUINAL HERNIA REPAIR WITH MESH;  Surgeon: Janet Chavez MD;  Location:  MAIN OR;  Service: General    TONSILLECTOMY     [3]   Social History  Tobacco Use    Smoking status: Former     Current packs/day: 0.00     Average packs/day: 2.5 packs/day for 46.0 years (115.0 ttl pk-yrs)     Types: Cigarettes     Start date:      Quit date: 2016     Years since quittin.4     Passive exposure: Past    Smokeless tobacco: Never   Vaping Use    Vaping status: Never Used   Substance and Sexual Activity    Alcohol use: Yes     Alcohol/week: 1.0 standard drink of alcohol     Comment: socially    Drug use: Never    Sexual activity: Yes     Partners: Male     Birth control/protection: Condom Male   [4]   Social History  Tobacco Use    Smoking status: Former     Current packs/day: 0.00     Average packs/day: 2.5 packs/day for 46.0 years (115.0 ttl pk-yrs)     Types: Cigarettes     Start date:      Quit date: 2016     Years since quittin.4     Passive exposure: Past    Smokeless tobacco: Never   Vaping Use    Vaping status: Never Used   Substance and Sexual Activity    Alcohol use: Yes     Alcohol/week: 1.0 standard drink of alcohol     Comment: socially    Drug use: Never    Sexual activity: Yes     Partners: Male     Birth control/protection: Condom Male   [5]   Current Facility-Administered Medications:     acetaminophen (TYLENOL) tablet 975 mg, 975 mg, Oral, Q6H [facility], Nima Wolfe MD, 975 mg at 25 0550    aluminum-magnesium hydroxide-simethicone (MAALOX) oral suspension 30 mL, 30 mL,  Oral, Q6H PRN, Nima Wolfe MD    amLODIPine (NORVASC) tablet 10 mg, 10 mg, Oral, Daily, Nima Wolfe MD, 10 mg at 06/20/25 0817    aspirin (ECOTRIN LOW STRENGTH) EC tablet 81 mg, 81 mg, Oral, Daily, Nima Wolfe MD, 81 mg at 06/20/25 0816    calcium carbonate (TUMS) chewable tablet 1,000 mg, 1,000 mg, Oral, Daily PRN, Nima Wolfe MD    gabapentin (NEURONTIN) capsule 100 mg, 100 mg, Oral, TID, Nima Wolfe MD, 100 mg at 06/20/25 0816    heparin (porcine) subcutaneous injection 5,000 Units, 5,000 Units, Subcutaneous, Q8H VICKIE, Nima Wolfe MD, 5,000 Units at 06/20/25 0550    [Held by provider] ketorolac (TORADOL) injection 7.5 mg, 7.5 mg, Intravenous, Q6H VICKIE, Nima Wolfe MD, 7.5 mg at 06/20/25 0552    lactated ringers bolus 1,000 mL, 1,000 mL, Intravenous, Once PRN **AND** lactated ringers bolus 1,000 mL, 1,000 mL, Intravenous, Once PRN, Nima Wolfe MD    lactated ringers infusion, 125 mL/hr, Intravenous, Continuous, Nima Wolfe MD, Last Rate: 125 mL/hr at 06/19/25 1903, 125 mL/hr at 06/19/25 1903    loratadine (CLARITIN) tablet 10 mg, 10 mg, Oral, Daily, Nima Wolfe MD, 10 mg at 06/20/25 0816    [Held by provider] losartan (COZAAR) tablet 100 mg, 100 mg, Oral, Daily, Nima Wolfe MD, 100 mg at 06/20/25 0817    magnesium hydroxide (MILK OF MAGNESIA) oral suspension 30 mL, 30 mL, Oral, Daily PRN, Nima Wolfe MD    mycophenolate (CELLCEPT) capsule 500 mg, 500 mg, Oral, Q12H VICKIE, Nima Wolfe MD, 500 mg at 06/20/25 0818    ondansetron (ZOFRAN) injection 4 mg, 4 mg, Intravenous, Q6H PRN, Nima Wolfe MD    oxybutynin (DITROPAN-XL) 24 hr tablet 5 mg, 5 mg, Oral, Daily, Nima Wolfe MD, 5 mg at 06/20/25 0816    oxyCODONE (ROXICODONE) split tablet 2.5 mg, 2.5 mg, Oral, Q4H PRN, 2.5 mg at 06/20/25 0957 **OR** oxyCODONE (ROXICODONE) IR tablet 5 mg, 5 mg, Oral, Q4H PRN, Nima Wolfe MD, 5 mg at 06/19/25 1935    pantoprazole (PROTONIX) EC tablet 40 mg, 40 mg, Oral, Daily  Before Breakfast, Nima Wolfe MD, 40 mg at 06/20/25 0551    pravastatin (PRAVACHOL) tablet 40 mg, 40 mg, Oral, HS, Niam Wolfe MD, 40 mg at 06/19/25 2108    promethazine (PHENERGAN) injection 12.5 mg, 12.5 mg, Intravenous, Q6H PRN, Xiao Ordaz PA-C, 12.5 mg at 06/19/25 1938    senna (SENOKOT) tablet 8.6 mg, 1 tablet, Oral, Daily, Nima Wolfe MD, 8.6 mg at 06/20/25 0816    tacrolimus (PROGRAF) capsule 2 mg, 2 mg, Oral, QAM, 2 mg at 06/20/25 0958 **AND** tacrolimus (PROGRAF) capsule 2.5 mg, 2.5 mg, Oral, HS, Nima Wolfe MD, 2.5 mg at 06/19/25 2108    [Held by provider] tadalafil (CIALIS) tablet 5 mg, 5 mg, Oral, Daily, Nima Wolfe MD    tamsulosin (FLOMAX) capsule 0.4 mg, 0.4 mg, Oral, HS, Nima Wolfe MD, 0.4 mg at 06/19/25 2108

## 2025-06-20 NOTE — DISCHARGE INSTR - AVS FIRST PAGE
Dear Mr. Rea,     Your prostate removal surgery went well. As we spoke about, the next step is to get your recovered from the surgery.    Some bloody urine is quite normal for up to 2 weeks after surgery.  If the urine his bloody but clear at this is not concerning.  However if it is bloody and thick like ketchup this is concerning and you should let us know.    Feelings of having to urinate more often with urgency and having bladder spasms is very common after this kind surgery as it is your bladder's way of reacting to the surgery.  I have written for medications that should help with some of the symptoms expected this surgery. Ditropan is for urgency and frequency and can be taken as needed. There are detailed instructions on how to take pain medication below.     If your catheter is not draining any urine and you feel like your bladder is full, please call us immediately.    You will see one of my nurses to have your catheter removed 6-10 days after surgery. It is possible that you have some urinary leakage after the catheter comes out. This is normal for a period of days to weeks as your bladder gets used to being no longer obstructed by the prostate.     We will go over pathology results when see me back in clinic and we will discuss next steps. As we discussed in the office, there is always a chance of finding prostate cancer after this procedure.    Please find instructions on more of what to expect and aftercare from your procedure below.     Please call us if you have any questions or concerns, [phone].    Nima Bee MD  [facility]   [facility]            Pain Control     Pain control is important to the healing process. You should expect to have some pain in the days after surgery even when on pain medication. The goal of the medication is not to make you completely pain free, but instead to make sure that your pain does not prevent you from performing  daily activities and recovering from your procedure. I understand anxiety that you may have taking stronger pain medications. Please follow these instructions in order to minimize the use of addictive pain medications while making sure that your pain is treated appropriately so that you can focus on healing from your surgery.     Tylenol (non-addictive)   If you have a prescription for Tylenol, please take this medication at a dose of 1000mg (two extra strength Tylenol pills) every 6-8 hours around the clock. DO NOT take more than 4000mg of Tylenol in a 24 hour period. Tylenol is a powerful pain medication at these doses and it is important to take is as scheduled to keep your pain under control. Tylenol should typically be the last medication that you stop when your pain is improved.     Motrin (non-addictive)   If you have a prescription for Motrin, please take this medication 600mg every 6-8 hours around the clock. This medication can have an effect on your kidneys so it is important that you stay well hydrated while taking it. Avoid taking this medication on an empty stomach.     Gabapentin (non-addictive)  If you have a prescription for Gabapentin (Neurontin), please take this medication at the prescribed dose three times daily. This medication may make you drowsy. Do not driving vehicles or operating heavy machinery while on this medication.    Oxycodone (narcotic pain medication)   In rare cases, you may have a small prescription for oxycodone, which is a narcotic pain medication. The risk of addiction with this medication is low as long as it is taken as prescribed. This medication should be used only if pain is not controlled by the other medications that you have been given and should be stopped as soon as you are able. Do not drive or operate heavy machinery while on this type of medication      [facility]: [phone]

## 2025-06-20 NOTE — CASE MANAGEMENT
Case Management Discharge Planning Note    Patient name Chel Rea  Location East 5 /E5 -* MRN [id number]  : 1955 Date 2025       Current Admission Date: 2025  Current Admission Diagnosis:Benign prostatic hyperplasia with urinary obstruction   Patient Active Problem List    Diagnosis Date Noted    CKD stage 3a, GFR 45-59 ml/min (HCC) 2025    Melanoma in situ of other part of trunk ([facility]) 2025    History of colon polyps 2024    History of heart transplant ([facility]) 2024    GERD (gastroesophageal reflux disease)     Combined arterial insufficiency and corporo-venous occlusive erectile dysfunction 2023    Elevated PSA 10/16/2023    Benign prostatic hyperplasia with urinary obstruction 10/16/2023    Chronic systolic heart failure ([facility]) 10/12/2023    Stage 3a chronic kidney disease ([facility]) 10/12/2023    Heart transplant, heterotopic, status ([facility]) 2021    Hyperlipidemia LDL goal <100 2021    Thrombocytopenia ([facility]) 2020    Left inguinal hernia 2018    Hypertension 2011      LOS (days): 0  Geometric Mean LOS (GMLOS) (days):   Days to GMLOS:     OBJECTIVE:            Current admission status: Outpatient Surgery   Preferred Pharmacy:   [id number]  [place] - [address]  [address]  [place] [address]  Phone: [phone] Fax: [phone]     [facility] [place], [place] - [address] [place] [address]  Phone: [phone] Fax: [phone]    [facility] [place] - [address]  [facility],  [address]  [facility],  First Floor [facility] [address]  Phone: [phone] Fax: [phone]    Primary Care Provider: Toribio Thomas    Primary Insurance: [facility]  Secondary Insurance:     DISCHARGE DETAILS:    Transport at Discharge : Ride Share        Transported by (Company and Unit #): [employer]  ETA of Transport (Date): 25  ETA of Transport (Time):  5282       Additional Comments: CM informed by nursing pt needs lyft transport home. CM setup lyft for 4:30pm. Nursing aware of transport time and pickup at ED entrance.

## 2025-06-23 NOTE — TELEPHONE ENCOUNTER
Pt called stating he did not pay his electric bill and was told it will be shut off unless paid or a medical note is faxed [phone] [employer]

## 2025-06-23 NOTE — TELEPHONE ENCOUNTER
Izabel, patient's nurse returned call.  She stated that she will take catheter out the morning of 6/27.  She wants to confirm how water is in the balloon for when she takes out the catheter.    Please advise.

## 2025-06-23 NOTE — TELEPHONE ENCOUNTER
"Post Op Note    Chel Rea is a 69 y.o. male s/p prostatectomy suprapubic laparoscopic with robotics performed 06/19/2025.  Chel Rea is a patient of Dr. Dr. Wolfe and is seen at the [facility].     How would you rate your pain on a scale from 1 to 10, 10 being the worst pain ever? 1  Have you had a fever? No    Have your bowel movements been regular? Yes  Do you have any difficulty urinating? No  If the patient has an incision- do you have any redness around the incision or any drainage from the incision? No    If the patient has a russo- are you comfortable caring for your russo? Yes Is it draining urine? Yes  Do you have any other questions or concerns that I can address at this time?   Called and spoke with the patient at this time. Tom states \" I am doing well\"  Managing the catheter without difficulty. Russo patent draining \"brown\" urine. Advised the patient he needs to hydrate better with water.  Bowels normal. Eating and drinking.  Patient needs a TOV for 6/27/2025. Left a voicemail message with the patient's nurse Izabel asking if she would be able to remove the patient's russo in the morning and then Tom is scheduled for a PVR at 3 pm at the [facility] office.  [facility] Life number [phone]  "

## 2025-06-25 ENCOUNTER — TELEPHONE (OUTPATIENT)
Age: 70
End: 2025-06-25

## 2025-06-25 NOTE — TELEPHONE ENCOUNTER
Patient called to inquire if he can sit in baby pool for Reyes catheter. Advised he should shower only, no soaking in water. Suggested he get a chair and sit next to the baby pool with feet in.

## 2025-06-26 PROCEDURE — 88342 IMHCHEM/IMCYTCHM 1ST ANTB: CPT | Performed by: PATHOLOGY

## 2025-06-26 PROCEDURE — 88341 IMHCHEM/IMCYTCHM EA ADD ANTB: CPT | Performed by: PATHOLOGY

## 2025-06-26 PROCEDURE — 88307 TISSUE EXAM BY PATHOLOGIST: CPT | Performed by: PATHOLOGY

## 2025-06-26 PROCEDURE — 88344 IMHCHEM/IMCYTCHM EA MLT ANTB: CPT | Performed by: PATHOLOGY

## 2025-06-27 ENCOUNTER — PROCEDURE VISIT (OUTPATIENT)
Dept: UROLOGY | Facility: CLINIC | Age: 70
End: 2025-06-27
Payer: MEDICARE

## 2025-06-27 VITALS
SYSTOLIC BLOOD PRESSURE: 124 MMHG | HEART RATE: 102 BPM | WEIGHT: 150 LBS | BODY MASS INDEX: 24.99 KG/M2 | HEIGHT: 65 IN | OXYGEN SATURATION: 99 % | DIASTOLIC BLOOD PRESSURE: 80 MMHG | RESPIRATION RATE: 20 BRPM

## 2025-06-27 DIAGNOSIS — N40.1 BENIGN PROSTATIC HYPERPLASIA WITH URINARY OBSTRUCTION: Primary | ICD-10-CM

## 2025-06-27 DIAGNOSIS — N13.8 BENIGN PROSTATIC HYPERPLASIA WITH URINARY OBSTRUCTION: Primary | ICD-10-CM

## 2025-06-27 PROCEDURE — 51798 US URINE CAPACITY MEASURE: CPT

## 2025-06-30 LAB — POST-VOID RESIDUAL VOLUME, ML POC: 128 ML

## 2025-06-30 NOTE — PROGRESS NOTES
"6/27/2025  Chel Rea is a 69 y.o. male  [id number]    Diagnosis:  Chief Complaint    Benign Prostatic Hypertrophy         Patient presents for follow up post void residual s/p prostatectomy suprapubic with robotics 6/19/2025 managed by Dr. Wolfe  Patient's russo removed by [facility]'s RN this morning.  Plan:  Has post op appointment on 7/11/2025 at the [facility] office.        Assessment:      Vitals:    06/27/25 1449   BP: 124/80   BP Location: Left arm   Patient Position: Sitting   Cuff Size: Adult   Pulse: 102   Resp: 20   SpO2: 99%   Weight: 68 kg (150 lb)   Height: 5' 5\" (1.651 m)           Patient voided 0 mL in the office.  Post void residual measured via bladder scan to be 128 mL  Patient states he has voided several times since russo removal.  Recent Results (from the past hour)   POCT Measure PVR    Collection Time: 06/30/25  4:04 PM   Result Value Ref Range    POST-VOID RESIDUAL VOLUME, ML  mL          Capri Solis RN     "

## 2025-07-08 NOTE — PROGRESS NOTES
Ambulatory Visit  Name: Chel Rea      : 1955      MRN: [id number]  Encounter Provider: Nima Wolfe MD  Encounter Date: 2025   Encounter department: [facility] UROLOGY [place] END    Assessment & Plan  BPH with elevated PSA and lower urinary tract symptoms  S/p robotic simple prostatectomy on 25. Excellent result. He is very happy with his current urinary habit.     He does have persistent nocturia. We discussed that this may related to undiagnosed sleep apnea and that he should consider evaluation for this if it does not improve over the coming months.     I expect that his PSA will decreased dramatically on next check.    - PSA prior to his next scheduled follow up with Dr. Early. He will follow up with me as needed    Orders:    PSA Total, Diagnostic; Future      History of Present Illness     Chel Rea is a 69 y.o. male who presents for follow up after RASP on 25. He had hist catheter removed on . He has been doing well. Hematuria resolved.     He is very happy with his result. Minimal pain. Voiding better than he has in years.     Daytime urinary symptoms resolved.     He does continue to have nocturia.     History obtained from : patient      Objective     /64 (BP Location: Left arm, Patient Position: Sitting, Cuff Size: Standard)   Pulse 103   Wt 68 kg (150 lb)   SpO2 98%   BMI 24.96 kg/m²   Physical Exam  Vitals:    25 1054   BP: 102/64   Pulse: 103   SpO2: 98%      General: Well appearing, no acute distress   HEENT: normocephalic, atraumatic  Eyes: extraocular movements intact  Cardiovascular: normal rate   Respiratory: no respiratory distress, effort normal   Abdominal: Non-distended, non-tender   : Deferred  MSK: Grossly normal range of motion   Neurological: No gross focal deficits  Behavioral: Normal mood and affect     Results  Lab Results   Component Value Date    PSA 30.647 (H) 2025    PSA 29.897 (H) 08/15/2024    PSA 22.9 (H)  05/14/2024     Lab Results   Component Value Date    CALCIUM 8.5 06/20/2025    K 4.8 06/20/2025    CO2 26 06/20/2025     06/20/2025    BUN 19 06/20/2025    CREATININE 1.17 06/20/2025     Lab Results   Component Value Date    WBC 6.76 06/20/2025    HGB 10.9 (L) 06/20/2025    HCT 31.6 (L) 06/20/2025    MCV 83 06/20/2025     (L) 06/20/2025         Office Urine Dip  No results found for this or any previous visit (from the past hour).]    Administrative Statements

## 2025-07-11 ENCOUNTER — OFFICE VISIT (OUTPATIENT)
Dept: UROLOGY | Facility: CLINIC | Age: 70
End: 2025-07-11

## 2025-07-11 VITALS
WEIGHT: 150 LBS | SYSTOLIC BLOOD PRESSURE: 102 MMHG | DIASTOLIC BLOOD PRESSURE: 64 MMHG | HEART RATE: 103 BPM | BODY MASS INDEX: 24.96 KG/M2 | OXYGEN SATURATION: 98 %

## 2025-07-11 DIAGNOSIS — R97.20 BPH WITH ELEVATED PSA AND LOWER URINARY TRACT SYMPTOMS: Primary | ICD-10-CM

## 2025-07-11 DIAGNOSIS — N40.1 BPH WITH ELEVATED PSA AND LOWER URINARY TRACT SYMPTOMS: Primary | ICD-10-CM

## 2025-07-11 PROCEDURE — 99024 POSTOP FOLLOW-UP VISIT: CPT

## 2025-07-11 NOTE — LETTER
2025     Reyes Early MD  [address]  [address]  [facility] [address]    Patient: Chel Rea   YOB: 1955   Date of Visit: 2025       Dear Dr. Reyes Early MD:    Thank you for referring Chel Rea to me for evaluation. Below are my notes for this consultation.    If you have questions, please do not hesitate to call me. I look forward to following your patient along with you.         Sincerely,        Nima Wolfe MD        CC: No Recipients    Nima Wolfe MD  2025 12:12 PM  Sign when Signing Visit  Ambulatory Visit  Name: Chel Rea      : 1955      MRN: [id number]  Encounter Provider: Nima Wolfe MD  Encounter Date: 2025   Encounter department: [facility]OLOG[facility] [place] [facility]    Assessment & Plan  BPH with elevated PSA and lower urinary tract symptoms  S/p robotic simple prostatectomy on 25. Excellent result. He is very happy with his current urinary habit.     He does have persistent nocturia. We discussed that this may related to undiagnosed sleep apnea and that he should consider evaluation for this if it does not improve over the coming months.     I expect that his PSA will decreased dramatically on next check.    - PSA prior to his next scheduled follow up with Dr. Early. He will follow up with me as needed    Orders:  •  PSA Total, Diagnostic; Future      History of Present Illness    Chel Rea is a 69 y.o. male who presents for follow up after RASP on 25. He had hist catheter removed on . He has been doing well. Hematuria resolved.     He is very happy with his result. Minimal pain. Voiding better than he has in years.     Daytime urinary symptoms resolved.     He does continue to have nocturia.     History obtained from : patient      Objective    /64 (BP Location: Left arm, Patient Position: Sitting, Cuff Size: Standard)   Pulse 103   Wt 68 kg (150 lb)   SpO2 98%   BMI 24.96  kg/m²   Physical Exam  Vitals:    07/11/25 1054   BP: 102/64   Pulse: 103   SpO2: 98%      General: Well appearing, no acute distress   HEENT: normocephalic, atraumatic  Eyes: extraocular movements intact  Cardiovascular: normal rate   Respiratory: no respiratory distress, effort normal   Abdominal: Non-distended, non-tender   : Deferred  MSK: Grossly normal range of motion   Neurological: No gross focal deficits  Behavioral: Normal mood and affect     Results  Lab Results   Component Value Date    PSA 30.647 (H) 02/22/2025    PSA 29.897 (H) 08/15/2024    PSA 22.9 (H) 05/14/2024     Lab Results   Component Value Date    CALCIUM 8.5 06/20/2025    K 4.8 06/20/2025    CO2 26 06/20/2025     06/20/2025    BUN 19 06/20/2025    CREATININE 1.17 06/20/2025     Lab Results   Component Value Date    WBC 6.76 06/20/2025    HGB 10.9 (L) 06/20/2025    HCT 31.6 (L) 06/20/2025    MCV 83 06/20/2025     (L) 06/20/2025         Office Urine Dip  No results found for this or any previous visit (from the past hour).]    Administrative Statements

## 2025-08-04 ENCOUNTER — HOSPITAL ENCOUNTER (INPATIENT)
Facility: HOSPITAL | Age: 70
LOS: 1 days | Discharge: HOME | DRG: 596 | End: 2025-08-05
Attending: EMERGENCY MEDICINE | Admitting: INTERNAL MEDICINE
Payer: MEDICARE

## 2025-08-04 PROBLEM — B02.9 ZOSTER: Status: ACTIVE | Noted: 2025-08-04

## 2025-08-08 ENCOUNTER — OFFICE VISIT (OUTPATIENT)
Age: 70
End: 2025-08-08
Payer: MEDICARE

## 2025-08-08 DIAGNOSIS — B02.31 HERPES ZOSTER CONJUNCTIVITIS OF RIGHT EYE: Primary | ICD-10-CM

## 2025-08-08 PROCEDURE — 99202 OFFICE O/P NEW SF 15 MIN: CPT | Performed by: OPHTHALMOLOGY

## 2025-08-08 RX ORDER — ERYTHROMYCIN 5 MG/G
OINTMENT OPHTHALMIC
Qty: 3.5 G | Refills: 2 | Status: SHIPPED | OUTPATIENT
Start: 2025-08-08

## (undated) DEVICE — SYRINGE,TOOMEY,IRRIGATION,70CC,STERILE: Brand: MEDLINE

## (undated) DEVICE — Device

## (undated) DEVICE — SEAL

## (undated) DEVICE — LUBRICANT JELLY SURGILUBE TUBE 2OZ FLIP TOP

## (undated) DEVICE — BLADELESS OBTURATOR: Brand: WECK VISTA

## (undated) DEVICE — 3M™ TRANSPORE™ WHITE SURGICAL TAPE 1534-1, 1 INCH X 10 YARD (2,5CM X 9,1M), 12 ROLLS/CARTON 10 CARTONS/CASE: Brand: 3M™ TRANSPORE™

## (undated) DEVICE — SPECIMEN CONTAINER STERILE PEEL PACK

## (undated) DEVICE — EXOFIN PRECISION PEN HIGH VISCOSITY TOPICAL SKIN ADHESIVE: Brand: EXOFIN PRECISION PEN, 1G

## (undated) DEVICE — BULB SYRINGE,IRRIGATION WITH PROTECTIVE CAP: Brand: DOVER

## (undated) DEVICE — SCD SEQUENTIAL COMPRESSION COMFORT SLEEVE MEDIUM KNEE LENGTH: Brand: KENDALL SCD

## (undated) DEVICE — ULTRASOUND GEL STERILE FOIL PK

## (undated) DEVICE — TELFA NON-ADHERENT ABSORBENT DRESSING: Brand: TELFA

## (undated) DEVICE — ADHESIVE SKIN HIGH VISCOSITY EXOFIN 1ML

## (undated) DEVICE — VISUALIZATION SYSTEM: Brand: CLEARIFY

## (undated) DEVICE — UTILITY MARKER,BLACK WITH LABELS: Brand: DEVON

## (undated) DEVICE — 40595 XL TRENDELENBURG POSITIONING KIT: Brand: 40595 XL TRENDELENBURG POSITIONING KIT

## (undated) DEVICE — ROBOT INST CANNULA 8MM OBTURATOR BLUNT DISP

## (undated) DEVICE — RAZOR STERILE

## (undated) DEVICE — ASTOUND STANDARD SURGICAL GOWN, XL: Brand: CONVERTORS

## (undated) DEVICE — NEPTUNE E-SEP SMOKE EVACUATION PENCIL, COATED, 70MM BLADE, PUSH BUTTON SWITCH: Brand: NEPTUNE E-SEP

## (undated) DEVICE — STERILE POLYISOPRENE POWDER-FREE SURGICAL GLOVES: Brand: PROTEXIS

## (undated) DEVICE — SURGICAL CLIPPER BLADE GENERAL USE

## (undated) DEVICE — DISPOSABLE OR TOWEL: Brand: CARDINAL HEALTH

## (undated) DEVICE — KIT, BETHLEHEM ROBOTIC PROST: Brand: CARDINAL HEALTH

## (undated) DEVICE — CHLORHEXIDINE 4PCT 4 OZ

## (undated) DEVICE — TROCAR PORT ACCESS 5 X120MML W/BLDLS OPTICAL TIP AIRSEAL

## (undated) DEVICE — 1820 FOAM BLOCK NEEDLE COUNTER: Brand: DEVON

## (undated) DEVICE — DRAPE TOWEL: Brand: CONVERTORS

## (undated) DEVICE — LARGE NEEDLE DRIVER: Brand: ENDOWRIST

## (undated) DEVICE — HEMOSTATIC MATRIX SURGIFLO 8ML W/THROMBIN

## (undated) DEVICE — INTENDED FOR TISSUE SEPARATION, AND OTHER PROCEDURES THAT REQUIRE A SHARP SURGICAL BLADE TO PUNCTURE OR CUT.: Brand: BARD-PARKER SAFETY BLADES SIZE 15, STERILE

## (undated) DEVICE — CHLORAPREP HI-LITE 26ML ORANGE

## (undated) DEVICE — BASIC PACK: Brand: CONVERTORS

## (undated) DEVICE — SYRINGE 30ML LL

## (undated) DEVICE — MAX-CORE® DISPOSABLE CORE BIOPSY INSTRUMENT, 18G X 25CM: Brand: MAX-CORE

## (undated) DEVICE — ARM DRAPE

## (undated) DEVICE — TROCAR: Brand: KII FIOS FIRST ENTRY

## (undated) DEVICE — PACK CUSTOM GU CYSTO PACK RF

## (undated) DEVICE — ABSORBABLE WOUND CLOSURE DEVICE: Brand: V-LOC 90

## (undated) DEVICE — PERMANENT CAUTERY HOOK: Brand: ENDOWRIST

## (undated) DEVICE — LIGHT GLOVE GREEN

## (undated) DEVICE — BASIC SINGLE BASIN 2-LF: Brand: MEDLINE INDUSTRIES, INC.

## (undated) DEVICE — SYRINGE 10ML LL

## (undated) DEVICE — SPONGE 4 X 4 XRAY 16 PLY STRL LF RFD

## (undated) DEVICE — NEEDLE 25G X 1 1/2

## (undated) DEVICE — TIBURON TRANSVERSE LAPAROTOMY SHEET: Brand: CONVERTORS

## (undated) DEVICE — KENDALL SCD SEQUENTIAL COMPRESSION COMFORT SLEEVES, KNEE LENGTH, MEDIUM: Brand: KENDALL SCD

## (undated) DEVICE — STANDARD SURGICAL GOWN, L: Brand: CONVERTORS

## (undated) DEVICE — PENROSE DRAIN, 18 X 3 8: Brand: CARDINAL HEALTH

## (undated) DEVICE — CAUTERY TIP POLISHER: Brand: DEVON

## (undated) DEVICE — SUT MONOCRYL 4-0 PS-2 27 IN Y426H

## (undated) DEVICE — SKIN MARKER DUAL TIP WITH RULER CAP, FLEXIBLE RULER AND LABELS: Brand: DEVON

## (undated) DEVICE — SUT PROLENE 2-0 SH 30 IN 8833H

## (undated) DEVICE — NEEDLE BLUNT 18 G X 1 1/2IN

## (undated) DEVICE — SYSTEM TRANSPERINEAL ACCESS PRECISIONPOINT

## (undated) DEVICE — PREP PAD BNS: Brand: CONVERTORS

## (undated) DEVICE — SUT VICRYL 3-0 SH 27 IN J416H

## (undated) DEVICE — STERILE POLYISOPRENE POWDER-FREE SURGICAL GLOVES WITH EMOLLIENT COATING: Brand: PROTEXIS

## (undated) DEVICE — INTENDED FOR TISSUE SEPARATION, AND OTHER PROCEDURES THAT REQUIRE A SHARP SURGICAL BLADE TO PUNCTURE OR CUT.: Brand: BARD-PARKER SAFETY BLADES SIZE 11, STERILE

## (undated) DEVICE — 3M™ IOBAN™ 2 ANTIMICROBIAL INCISE DRAPE 6650EZ: Brand: IOBAN™ 2

## (undated) DEVICE — SYRINGE 10ML LL CONTROL TOP

## (undated) DEVICE — STERILE (2 X 30CM) LATEX COVER: Brand: PROCOVERS™

## (undated) DEVICE — RENTAL PROBE ULTRASOUND DROP IN BK5000

## (undated) DEVICE — COBRA GRASPER: Brand: ENDOWRIST

## (undated) DEVICE — SPONGE LAP 18 X 4 IN STRL RFD

## (undated) DEVICE — PREMIUM DRY TRAY LF: Brand: MEDLINE INDUSTRIES, INC.

## (undated) DEVICE — BIPOLAR GRASPER, LONG: Brand: ENDOWRIST

## (undated) DEVICE — INSUFFLATION NEEDLE TO ESTABLISH PNEUMOPERITONEUM.: Brand: INSUFFLATION NEEDLE

## (undated) DEVICE — SURGIFLO ENDOSCOPIC APPICATOR: Brand: ETHICON

## (undated) DEVICE — COLUMN DRAPE

## (undated) DEVICE — FORMALIN PRE-FILLED 10 PCT PROSTATE BIOPSY